# Patient Record
Sex: FEMALE | Race: BLACK OR AFRICAN AMERICAN | Employment: FULL TIME | URBAN - METROPOLITAN AREA
[De-identification: names, ages, dates, MRNs, and addresses within clinical notes are randomized per-mention and may not be internally consistent; named-entity substitution may affect disease eponyms.]

---

## 2017-02-01 RX ORDER — TEMAZEPAM 15 MG/1
CAPSULE ORAL
Qty: 60 CAP | Refills: 0 | OUTPATIENT
Start: 2017-02-01 | End: 2017-03-22 | Stop reason: SDUPTHER

## 2017-02-01 NOTE — TELEPHONE ENCOUNTER
Please call in Restoril per orders. [facility] web site reviewed, patient not in database.

## 2017-02-12 ENCOUNTER — APPOINTMENT (OUTPATIENT)
Dept: GENERAL RADIOLOGY | Age: 47
End: 2017-02-12
Attending: EMERGENCY MEDICINE
Payer: COMMERCIAL

## 2017-02-12 ENCOUNTER — HOSPITAL ENCOUNTER (EMERGENCY)
Age: 47
Discharge: HOME | End: 2017-02-12
Attending: EMERGENCY MEDICINE | Admitting: EMERGENCY MEDICINE
Payer: COMMERCIAL

## 2017-02-12 VITALS
TEMPERATURE: 97.9 F | SYSTOLIC BLOOD PRESSURE: 127 MMHG | WEIGHT: 186.8 LBS | HEART RATE: 70 BPM | DIASTOLIC BLOOD PRESSURE: 77 MMHG | BODY MASS INDEX: 30.02 KG/M2 | OXYGEN SATURATION: 98 % | RESPIRATION RATE: 16 BRPM | HEIGHT: 66 IN

## 2017-02-12 DIAGNOSIS — S39.012A BACK STRAIN, INITIAL ENCOUNTER: Primary | ICD-10-CM

## 2017-02-12 LAB
AMORPH CRY URNS QL MICRO: ABNORMAL
APPEARANCE UR: ABNORMAL
BACTERIA URNS QL MICRO: NEGATIVE /HPF
BILIRUB UR QL: NEGATIVE
COLOR UR: ABNORMAL
EPITH CASTS URNS QL MICRO: ABNORMAL /LPF
GLUCOSE UR STRIP.AUTO-MCNC: NEGATIVE MG/DL
HGB UR QL STRIP: ABNORMAL
KETONES UR QL STRIP.AUTO: NEGATIVE MG/DL
LEUKOCYTE ESTERASE UR QL STRIP.AUTO: ABNORMAL
MUCOUS THREADS URNS QL MICRO: ABNORMAL /LPF
NITRITE UR QL STRIP.AUTO: NEGATIVE
PH UR STRIP: 6 [PH] (ref 5–8)
PROT UR STRIP-MCNC: NEGATIVE MG/DL
RBC #/AREA URNS HPF: >100 /HPF (ref 0–5)
SP GR UR REFRACTOMETRY: 1.02 (ref 1–1.03)
UA: UC IF INDICATED,UAUC: ABNORMAL
UROBILINOGEN UR QL STRIP.AUTO: 1 EU/DL (ref 0.2–1)
WBC URNS QL MICRO: ABNORMAL /HPF (ref 0–4)

## 2017-02-12 PROCEDURE — 72100 X-RAY EXAM L-S SPINE 2/3 VWS: CPT

## 2017-02-12 PROCEDURE — 72072 X-RAY EXAM THORAC SPINE 3VWS: CPT

## 2017-02-12 PROCEDURE — 81001 URINALYSIS AUTO W/SCOPE: CPT | Performed by: EMERGENCY MEDICINE

## 2017-02-12 PROCEDURE — 99283 EMERGENCY DEPT VISIT LOW MDM: CPT

## 2017-02-12 PROCEDURE — 74011250636 HC RX REV CODE- 250/636: Performed by: EMERGENCY MEDICINE

## 2017-02-12 PROCEDURE — 96372 THER/PROPH/DIAG INJ SC/IM: CPT

## 2017-02-12 RX ORDER — KETOROLAC TROMETHAMINE 30 MG/ML
60 INJECTION, SOLUTION INTRAMUSCULAR; INTRAVENOUS
Status: COMPLETED | OUTPATIENT
Start: 2017-02-12 | End: 2017-02-12

## 2017-02-12 RX ORDER — CYCLOBENZAPRINE HCL 10 MG
5 TABLET ORAL
Qty: 30 TAB | Refills: 0 | Status: SHIPPED | OUTPATIENT
Start: 2017-02-12 | End: 2017-04-05

## 2017-02-12 RX ORDER — NAPROXEN 500 MG/1
500 TABLET ORAL 2 TIMES DAILY WITH MEALS
Qty: 30 TAB | Refills: 0 | Status: SHIPPED | OUTPATIENT
Start: 2017-02-12 | End: 2017-03-28

## 2017-02-12 RX ADMIN — KETOROLAC TROMETHAMINE 60 MG: 30 INJECTION, SOLUTION INTRAMUSCULAR at 03:28

## 2017-02-12 NOTE — ED PROVIDER NOTES
HPI Comments: 40 y.o. female with past medical history significant for asthma, GERD, hypercholesteremia, allergic rhinitis, and hysterectomy who presents from home with chief complaint of back pain. Pt c/o constant lower back pain that started two days ago. Pt states that she hurt her upper back (between her scapulas) 10 days ago trying to open a door. Pt states that she used heating pads on her back to relieve the pain. Pt denies any nausea, vomiting, abdominal pain, numbness, tingling, bowel movement changes, or dysuria. Pt also denies any back surgeries. There are no other acute medical concerns at this time. PCP: Virginia Barber MD    Note written by Cynthia Cotter, as dictated by Chayo Rea MD 2:57 AM        The history is provided by the patient. No  was used. Past Medical History:   Diagnosis Date    Allergic rhinitis 6/14/2013    Asthma     GERD (gastroesophageal reflux disease)     Headache     Headache(784.0)     Hypercholesteremia 1/23/2012    Snoring     Vitamin D deficiency 10/20/2015       Past Surgical History:   Procedure Laterality Date    Endoscopy, colon, diagnostic  12/03 & 04/09    Hx gyn       Laproscopy, BTL,TVH    Hx gyn       hysterectomy         Family History:   Problem Relation Age of Onset    Hypertension Father     Kidney Disease Maternal Grandmother     Hypertension Maternal Grandmother     Heart Attack Paternal Grandfather     Cancer Other        Social History     Social History    Marital status:      Spouse name: N/A    Number of children: N/A    Years of education: N/A     Occupational History    Not on file.      Social History Main Topics    Smoking status: Never Smoker    Smokeless tobacco: Never Used    Alcohol use Yes      Comment: Occasionally    Drug use: No    Sexual activity: Yes     Partners: Male     Birth control/ protection: None     Other Topics Concern    Not on file     Social History Narrative ALLERGIES: Latex and Aspirin    Review of Systems   Gastrointestinal: Negative for abdominal pain, constipation, diarrhea, nausea and vomiting. Genitourinary: Negative for dysuria. Musculoskeletal: Positive for back pain. Neurological: Negative for numbness. All other systems reviewed and are negative. Vitals:    02/12/17 0125   BP: 131/88   Pulse: 72   Resp: 18   Temp: 98 °F (36.7 °C)   SpO2: 97%   Weight: 84.7 kg (186 lb 12.8 oz)   Height: 5' 5.5\" (1.664 m)            Physical Exam   Nursing note and vitals reviewed. CONSTITUTIONAL: Well-appearing; well-nourished; in no apparent distress  HEAD: Normocephalic; atraumatic  EYES: PERRL; EOM intact; conjunctiva and sclera are clear bilaterally. ENT: No rhinorrhea; normal pharynx with no tonsillar hypertrophy; mucous membranes pink/moist, no erythema, no exudate. NECK: Supple; non-tender; no cervical lymphadenopathy  CARD: Normal S1, S2; no murmurs, rubs, or gallops. Regular rate and rhythm. RESP: Normal respiratory effort; breath sounds clear and equal bilaterally; no wheezes, rhonchi, or rales. ABD: Normal bowel sounds; non-distended; non-tender; no palpable organomegaly, no masses, no bruits. Back Exam: Normal inspection; L/S and Thoracic vertebral point tenderness, no CVA tenderness. Decreased range of motion due to pain. Leg raised: negative for pain; normal gait; reflexes intact  EXT: Normal ROM in all four extremities; non-tender to palpation; no swelling or deformity; distal pulses are normal, no edema. SKIN: Warm; dry; no rash. NEURO:Alert and oriented x 3, coherent, RILEY-XII grossly intact, sensory and motor are non-focal.        MDM  Number of Diagnoses or Management Options  Diagnosis management comments: Assessment: lumbar spine and thoracic spine discomfort that is palpable and reproducible on exam consistent with musculoskeletal etiology. Rule out acute spinal disease.  The patient has no neurological deficit, ambulate without difficulty. Suspicion for cord compression/cauda equina is very low. Plan: x-ray of the lumbar and thoracic spine/ analgesia/ serial exam/ Monitor and Reevaluate. Amount and/or Complexity of Data Reviewed  Clinical lab tests: ordered and reviewed  Tests in the radiology section of CPT®: ordered and reviewed  Tests in the medicine section of CPT®: reviewed and ordered  Discussion of test results with the performing providers: yes  Decide to obtain previous medical records or to obtain history from someone other than the patient: yes  Obtain history from someone other than the patient: yes  Review and summarize past medical records: yes  Discuss the patient with other providers: yes  Independent visualization of images, tracings, or specimens: yes    Risk of Complications, Morbidity, and/or Mortality  Presenting problems: moderate  Diagnostic procedures: moderate  Management options: moderate      ED Course       Procedures     XRAY INTERPRETATION (ED MD)  Xray of Lumbar spine shows no fracture. No subluxation/dislocation. No bony abnormality. Maryann Hall MD 3:08 AM      [facility] [place] INTERPRETATION (ED MD)  Xray of T-spine shows no fracture. No subluxation/dislocation. No bony abnormality. Maryann Hall MD 3:08 AM      Progress Note:   Pt has been reexamined by Maryann Hall MD. Pt is feeling much better. Symptoms have improved. All available results have been reviewed with pt and any available family. Pt understands sx, dx, and tx in ED. Care plan has been outlined and questions have been answered. Pt is ready to go home. Will send home on  back strain instructions. Prescription of Naproxen and Flexeril. Outpatient referral with PCP as needed. Written by Maryann Hall MD,3:30 AM    .   .

## 2017-02-12 NOTE — ED TRIAGE NOTES
Triage Note: \"My back hurts, from up here in between my blades all the way down and some on my lower sides, but it's mostly in my back. 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

## 2017-02-12 NOTE — LETTER
[address]  [address] [facility]  [id number]  [phone]    Work/School Note    Date: 2/12/2017    To Whom It May concern:    Micaela Paula was seen and treated today in the emergency room by the following provider(s):  Attending Provider: Amara Urbina MD.      Micaela Paula may return to work on 2/14/17 or sooner if symptoms improve.     Sincerely,          Priya Metcalf RN

## 2017-02-12 NOTE — DISCHARGE INSTRUCTIONS
We hope that we have addressed all of your medical concerns. The examination and treatment you received in the Emergency Department were for an emergent problem and were not intended as complete care. It is important that you follow up with your healthcare provider(s) for ongoing care. If your symptoms worsen or do not improve as expected, and you are unable to reach your usual health care provider(s), you should return to the Emergency Department. Today's healthcare is undergoing tremendous change, and patient satisfaction surveys are one of the many tools to assess the quality of medical care. You may receive a survey from the [employer] regarding your experience in the Emergency Department. I hope that your experience has been completely positive, particularly the medical care that I provided. As such, please participate in the survey; anything less than excellent does not meet my expectations or intentions. [facility] and [address] [place] [address] participate in nationally recognized quality of care measures. If your blood pressure is greater than 120/80, as reported below, we urge that you seek medical care to address the potential of high blood pressure, commonly known as hypertension. Hypertension can be hereditary or can be caused by certain medical conditions, pain, stress, or \"white coat syndrome. \"       Please make an appointment with your health care provider(s) for follow up of your Emergency Department visit. VITALS:   Patient Vitals for the past 8 hrs:   Temp Pulse Resp BP SpO2   02/12/17 0125 98 °F (36.7 °C) 72 18 131/88 97 %          Thank you for allowing us to provide you with medical care today. We realize that you have many choices for your emergency care needs. Please choose us in the future for any continued health care needs. Henrique Lyn MD    [facility].   Office: [phone]            Recent Results (from the past 24 hour(s))   URINALYSIS W/ REFLEX CULTURE    Collection Time: 02/12/17  1:57 AM   Result Value Ref Range    Color YELLOW/STRAW      Appearance CLOUDY (A) CLEAR      Specific gravity 1.021 1.003 - 1.030      pH (UA) 6.0 5.0 - 8.0      Protein NEGATIVE  NEG mg/dL    Glucose NEGATIVE  NEG mg/dL    Ketone NEGATIVE  NEG mg/dL    Bilirubin NEGATIVE  NEG      Blood LARGE (A) NEG      Urobilinogen 1.0 0.2 - 1.0 EU/dL    Nitrites NEGATIVE  NEG      Leukocyte Esterase SMALL (A) NEG      WBC 0-4 0 - 4 /hpf    RBC >100 (H) 0 - 5 /hpf    Epithelial cells MODERATE (A) FEW /lpf    Bacteria NEGATIVE  NEG /hpf    UA:UC IF INDICATED CULTURE NOT INDICATED BY UA RESULT CNI      Mucus TRACE (A) NEG /lpf    Amorphous Crystals FEW (A) NEG         No results found. Back Strain: Care Instructions  Your Care Instructions    Back strain happens when you overstretch, or pull, a muscle in your back. You may hurt your back in an accident or when you exercise or lift something. Most back pain will get better with rest and time. You can take care of yourself at home to help your back heal.  Follow-up care is a key part of your treatment and safety. Be sure to make and go to all appointments, and call your doctor if you are having problems. It's also a good idea to know your test results and keep a list of the medicines you take. How can you care for yourself at home? · Try to stay as active as you can, but stop or reduce any activity that causes pain. · Put ice or a cold pack on the sore muscle for 10 to 20 minutes at a time to stop swelling. Try this every 1 to 2 hours for 3 days (when you are awake) or until the swelling goes down. Put a thin cloth between the ice pack and your skin. · After 2 or 3 days, apply a heating pad on low or a warm cloth to your back. Some doctors suggest that you go back and forth between hot and cold treatments.   · Take pain medicines exactly as directed. ¨ If the doctor gave you a prescription medicine for pain, take it as prescribed. ¨ If you are not taking a prescription pain medicine, ask your doctor if you can take an over-the-counter medicine. · Try sleeping on your side with a pillow between your legs. Or put a pillow under your knees when you lie on your back. These measures can ease pain in your lower back. · Return to your usual level of activity slowly. When should you call for help? Call 911 anytime you think you may need emergency care. For example, call if:  · You are unable to move a leg at all. Call your doctor now or seek immediate medical care if:  · You have new or worse symptoms in your legs, belly, or buttocks. Symptoms may include:  ¨ Numbness or tingling. ¨ Weakness. ¨ Pain. · You lose bladder or bowel control. Watch closely for changes in your health, and be sure to contact your doctor if you are not getting better as expected. Where can you learn more? Go to http://mario-barry.info/. Enter C542 in the search box to learn more about \"Back Strain: Care Instructions. \"  Current as of: May 23, 2016  Content Version: 11.1  © 8966-2529 [employer]. Care instructions adapted under license by [employer] (which disclaims liability or warranty for this information). If you have questions about a medical condition or this instruction, always ask your healthcare professional. [place] [address] any warranty or liability for your use of this information.

## 2017-02-12 NOTE — ED NOTES
Patient given discharge instructions and verbalized understanding. Patient ambulatory out of department with steady gait.

## 2017-03-26 RX ORDER — TEMAZEPAM 15 MG/1
CAPSULE ORAL
Qty: 60 CAP | Refills: 0 | OUTPATIENT
Start: 2017-03-26 | End: 2017-03-28 | Stop reason: SDUPTHER

## 2017-03-28 ENCOUNTER — OFFICE VISIT (OUTPATIENT)
Dept: FAMILY MEDICINE CLINIC | Age: 47
End: 2017-03-28

## 2017-03-28 VITALS
RESPIRATION RATE: 20 BRPM | BODY MASS INDEX: 30.73 KG/M2 | DIASTOLIC BLOOD PRESSURE: 68 MMHG | WEIGHT: 191.2 LBS | SYSTOLIC BLOOD PRESSURE: 109 MMHG | TEMPERATURE: 98.1 F | HEIGHT: 66 IN | HEART RATE: 79 BPM

## 2017-03-28 DIAGNOSIS — F51.04 CHRONIC INSOMNIA: Primary | ICD-10-CM

## 2017-03-28 DIAGNOSIS — E66.9 NON MORBID OBESITY, UNSPECIFIED OBESITY TYPE: ICD-10-CM

## 2017-03-28 RX ORDER — TEMAZEPAM 15 MG/1
CAPSULE ORAL
Qty: 60 CAP | Refills: 0 | Status: SHIPPED | OUTPATIENT
Start: 2017-03-28 | End: 2017-08-27 | Stop reason: SDUPTHER

## 2017-03-28 NOTE — PROGRESS NOTES
HISTORY OF PRESENT ILLNESS  Lester Peters is a 40 y.o. female. Insomnia   This is a chronic problem. The current episode started more than 1 week ago. Episode frequency: almost daily. The problem has not changed since onset. Pertinent negatives include no chest pain and no shortness of breath. Exacerbated by: changing shifts. The symptoms are relieved by medications. Treatments tried: Restoril the days she works. 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 The treatment provided significant relief. Review of Systems   Constitutional: Positive for malaise/fatigue. Negative for weight loss. Weight gain   Respiratory: Negative for shortness of breath. Cardiovascular: Negative for chest pain and palpitations. Psychiatric/Behavioral: Negative for depression and substance abuse. The patient has insomnia. The patient is not nervous/anxious. Visit Vitals    /68 (BP 1 Location: Left arm, BP Patient Position: Sitting)    Pulse 79    Temp 98.1 °F (36.7 °C) (Oral)    Resp 20    Ht 5' 5.5\" (1.664 m)    Wt 191 lb 3.2 oz (86.7 kg)    BMI 31.33 kg/m2     Physical Exam   Constitutional: She is oriented to person, place, and time. She appears well-developed and well-nourished. No distress. Neurological: She is alert and oriented to person, place, and time. She displays no tremor. Skin: She is not diaphoretic. Psychiatric: She has a normal mood and affect. Her behavior is normal. Judgment and thought content normal.       ASSESSMENT and PLAN    ICD-10-CM ICD-9-CM    1. Chronic insomnia F51.04 780.52 temazepam (RESTORIL) 15 mg capsule   2. Non morbid obesity, unspecified obesity type E66.9 278.00         Insomnia related to shift work  Weight gain  Restoril refilled,  web site reviewed, patient not in [email or web address] discussed, information given  I have reviewed/discussed the above normal BMI with the patient.   I have recommended the following interventions: encourage exercise and lifestyle education regarding diet . The plan is as follows: I have counseled this patient on diet and exercise regimens. .      Follow-up Disposition:  Return in about 6 months (around 9/28/2017) for insomnia. Reviewed plan of care. Patient has provided input and agrees with goals.

## 2017-03-28 NOTE — MR AVS SNAPSHOT
Visit Information     Date & Time Provider Department Dept. Phone Encounter #    3/28/2017 10:30 AM Rand Duckworth MD [address] [address] [id number]      Follow-up Instructions     Return in about 6 months (around 9/28/2017) for insomnia. Upcoming Health Maintenance        Date Due    PAP AKA CERVICAL CYTOLOGY 10/9/1992    DTaP/Tdap/Td series (2 - Td) 3/28/2027      Allergies as of 3/28/2017  Review Complete On: 3/28/2017 By: Rand Duckworth MD       Severity Noted Reaction Type Reactions    Latex Medium 01/04/2013   Topical Rash    Aspirin  02/12/2017    Other (comments)    \"It doesn't digest in my system\"      Current Immunizations  Reviewed on 3/28/2017    No immunizations on file.        Reviewed by Wero Glasgow LPN on 0/98/5611 at 47:15 AM   You Were Diagnosed With        Codes Comments    Chronic insomnia    -  Primary ICD-10-CM: F51.04  ICD-9-CM: 780.52     Non morbid obesity, unspecified obesity type     ICD-10-CM: E66.9  ICD-9-CM: 278.00       Vitals     BP Pulse Temp Resp Height(growth percentile) Weight(growth percentile)    109/68 (BP 1 Location: Left arm, BP Patient Position: Sitting) 79 98.1 °F (36.7 °C) (Oral) 20 5' 5.5\" (1.664 m) 191 lb 3.2 oz (86.7 kg)    BMI OB Status Smoking Status             31.33 kg/m2 Hysterectomy Never Smoker         BMI and BSA Data     Body Mass Index Body Surface Area    31.33 kg/m 2 2 m 2         Preferred Pharmacy       Pharmacy Name Phone    [facility] [address] [facility] [address] [place] [phone]         Your Updated Medication List          This list is accurate as of: 3/28/17 11:40 AM.  Always use your most recent med list.                butalbital-acetaminophen-caff -40 mg per capsule   Commonly known as:  FIORICET   TAKE 1 TO 2 CAPSULE BY MOUTH EVERY 8 HOURS AS NEEDED FOR HEADACHES       CAMBIA 50 mg Pwpk   Generic drug:  Diclofenac Potassium   1 at HA onset       cyclobenzaprine 10 mg tablet   Commonly known as:  FLEXERIL   Take 0.5 Tabs by mouth three (3) times daily as needed for Muscle Spasm(s). pravastatin 20 mg tablet   Commonly known as:  PRAVACHOL   TAKE 1 TABLET BY MOUTH EVERY NIGHT       temazepam 15 mg capsule   Commonly known as:  RESTORIL   TAKE 1 TO 2 CAPSULE BY MOUTH EVERY NIGHT AT BEDTIME AS NEEDED FOR SLEEP       ZyrTEC 10 mg Cap   Generic drug:  Cetirizine   Take 10 mg by mouth daily. Prescriptions Printed        Refills    temazepam (RESTORIL) 15 mg capsule 0    Sig: TAKE 1 TO 2 CAPSULE BY MOUTH EVERY NIGHT AT BEDTIME AS NEEDED FOR SLEEP    Class: Print      Follow-up Instructions     Return in about 6 months (around 9/28/2017) for insomnia. Patient Instructions    Learning About Sleeping Well  What does sleeping well mean? Sleeping well means getting enough sleep. How much sleep is enough varies among people. The number of hours you sleep is not as important as how you feel when you wake up. If you do not feel refreshed, you probably need more sleep. Another sign of not getting enough sleep is feeling tired during the day. The average total nightly sleep time is 7½ to 8 hours. Healthy adults may need a little more or a little less than this. Why is getting enough sleep important? Getting enough quality sleep is a basic part of good health. When your sleep suffers, your mood and your thoughts can suffer too. You may find yourself feeling more grumpy or stressed. Not getting enough sleep also can lead to serious problems, including injury, accidents, anxiety, and depression. What might cause poor sleeping? Many things can cause sleep problems, including:  Stress. Stress can be caused by fear about a single event, such as giving a speech. Or you may have ongoing stress, such as worry about work or school. Depression, anxiety, and other mental or emotional conditions.    Changes in your sleep habits or surroundings. This includes changes that happen where you sleep, such as noise, light, or sleeping in a different bed. It also includes changes in your sleep pattern, such as having jet lag or working a late shift. Health problems, such as pain, breathing problems, and restless legs syndrome. Lack of regular exercise. How can you help yourself? Here are some tips that may help you sleep more soundly and wake up feeling more refreshed. Your sleeping area  Use your bedroom only for sleeping and sex. A bit of light reading may help you fall asleep. But if it doesn't, do your reading elsewhere in the house. Don't watch TV in bed. Be sure your bed is big enough to stretch out comfortably, especially if you have a sleep partner. Keep your bedroom quiet, dark, and cool. Use curtains, blinds, or a sleep mask to block out light. To block out noise, use earplugs, soothing music, or a \"white noise\" machine. Your evening and bedtime routine  Get regular exercise, but not within 3 to 4 hours before your bedtime. Create a relaxing bedtime routine. You might want to take a warm shower or bath, listen to soothing music, or drink a cup of noncaffeinated tea. Go to bed at the same time every night. And get up at the same time every morning, even if you feel tired. What to avoid  Limit caffeine (coffee, tea, caffeinated sodas) during the day, and don't have any for at least 4 to 6 hours before bedtime. Don't drink alcohol before bedtime. Alcohol can cause you to wake up more often during the night. Don't smoke or use tobacco, especially in the evening. Nicotine can keep you awake. Don't take naps during the day, especially close to bedtime. Don't lie in bed awake for too long. If you can't fall asleep, or if you wake up in the middle of the night and can't get back to sleep within 15 minutes or so, get out of bed and go to another room until you feel sleepy.    Don't take medicine that may keep you awake, or make you feel hyper or energized, right before bed. Your doctor can tell you if your medicine may do this and if you can take it earlier in the day. If you can't sleep  Imagine yourself in a peaceful, pleasant scene. Focus on the details and feelings of being in a place that is relaxing. Get up and do a quiet or boring activity until you feel sleepy. Don't drink any liquids after 6 p.m. if you wake up often because you have to go to the bathroom. Where can you learn more? Go to [employer].be  Enter W442 in the search box to learn more about \"Learning About Sleeping Well. \"   © 6152-4832 Healthwise, Incorporated. Care instructions adapted under license by [employer] (which disclaims liability or warranty for this information). This care instruction is for use with your licensed healthcare professional. If you have questions about a medical condition or this instruction, always ask your healthcare professional. [place] [address] any warranty or liability for your use of this information. Content Version: 5.5.74802; Last Revised: March 18, 2011          Insomnia: After Your Visit  Your Care Instructions  Insomnia is the inability to sleep well. It is a common problem for most people at some time. Insomnia may make it hard for you to get to sleep, stay asleep, or sleep as long as you need to. This can make you tired and grouchy during the day. It can also make you forgetful, less effective at work, and unhappy. Insomnia can be caused by conditions such as depression or anxiety. Pain can also affect your ability to sleep. When these problems are solved, the insomnia usually clears up. But sometimes bad sleep habits can cause insomnia. If insomnia is affecting your work or your enjoyment of life, you can take steps to improve your sleep. Follow-up care is a key part of your treatment and safety.  Be sure to make and go to all appointments, and call your doctor if you are having problems. Its also a good idea to know your test results and keep a list of the medicines you take. How can you care for yourself at home? What to avoid  Do not have drinks with caffeine, such as coffee or black tea, for 8 hours before bed. Do not smoke or use other types of tobacco near bedtime. Nicotine is a stimulant and can keep you awake. Avoid drinking alcohol late in the evening, because it can cause you to wake in the middle of the night. Do not eat a big meal close to bedtime. If you are hungry, eat a light snack. Do not drink a lot of water close to bedtime, because the need to urinate may wake you up during the night. Do not read or watch TV in bed. Use the bed only for sleeping and sexual activity. What to try  Go to bed at the same time every night, and wake up at the same time every morning. Do not take naps during the day. Keep your bedroom quiet, dark, and cool. Get regular exercise, but not within 3 to 4 hours of your bedtime. .   Sleep on a comfortable pillow and mattress. If watching the clock makes you anxious, turn it facing away from you so you cannot see the time. If you worry when you lie down, start a worry book. Well before bedtime, write down your worries, and then set the book and your concerns aside. Try meditation or other relaxation techniques before you go to bed. If you cannot fall asleep after 15 minutes, get up and go to another room until you feel sleepy. Do something relaxing. Repeat your bedtime routine before you go to bed again. Repeat this as often as necessary. Make your house quiet and calm about an hour before bedtime. Turn down the lights, turn off the TV, log off the computer, and turn down the volume on music. This can help you relax after a busy day. When should you call for help? Watch closely for changes in your health, and be sure to contact your doctor if:  Your efforts to improve your sleep do not work.    Your insomnia gets worse.   You fall asleep during the day. Where can you learn more? Go to [employer].be  Enter P513 in the search box to learn more about \"Insomnia: After Your Visit. \"   © 9842-0311 Healthwise, Incorporated. Care instructions adapted under license by [employer] (which disclaims liability or warranty for this information). This care instruction is for use with your licensed healthcare professional. If you have questions about a medical condition or this instruction, always ask your healthcare professional. [address] any warranty or liability for your use of this information. Content Version: 9.5.60743; Last Revised: June 26, 2010                 Introducing [facility]! Dear Leopoldo Armour: Thank you for requesting a [employer] account. Our records indicate that you already have an active [employer] account. You can access your account anytime at [email or web address]     Did you know that you can access your hospital and ER discharge instructions at any time in [employer]? You can also review all of your test results from your hospital stay or ER visit. Additional Information    If you have questions, please visit the Frequently Asked Questions section of the [employer] website at [email or web address] Remember, [employer] is NOT to be used for urgent needs. For medical emergencies, dial 911. Now available from your iPhone and Android! Please provide this summary of care documentation to your next provider. Your primary care clinician is listed as Traci Campoverde. If you have any questions after today's visit, please call [phone].

## 2017-03-28 NOTE — PATIENT INSTRUCTIONS
Learning About Sleeping Well  What does sleeping well mean? Sleeping well means getting enough sleep. How much sleep is enough varies among people. The number of hours you sleep is not as important as how you feel when you wake up. If you do not feel refreshed, you probably need more sleep. Another sign of not getting enough sleep is feeling tired during the day. The average total nightly sleep time is 7½ to 8 hours. Healthy adults may need a little more or a little less than this. Why is getting enough sleep important? Getting enough quality sleep is a basic part of good health. When your sleep suffers, your mood and your thoughts can suffer too. You may find yourself feeling more grumpy or stressed. Not getting enough sleep also can lead to serious problems, including injury, accidents, anxiety, and depression. What might cause poor sleeping? Many things can cause sleep problems, including:  Stress. Stress can be caused by fear about a single event, such as giving a speech. Or you may have ongoing stress, such as worry about work or school. Depression, anxiety, and other mental or emotional conditions. Changes in your sleep habits or surroundings. This includes changes that happen where you sleep, such as noise, light, or sleeping in a different bed. It also includes changes in your sleep pattern, such as having jet lag or working a late shift. Health problems, such as pain, breathing problems, and restless legs syndrome. Lack of regular exercise. How can you help yourself? Here are some tips that may help you sleep more soundly and wake up feeling more refreshed. Your sleeping area  Use your bedroom only for sleeping and sex. A bit of light reading may help you fall asleep. But if it doesn't, do your reading elsewhere in the house. Don't watch TV in bed. Be sure your bed is big enough to stretch out comfortably, especially if you have a sleep partner. Keep your bedroom quiet, dark, and cool. Use curtains, blinds, or a sleep mask to block out light. To block out noise, use earplugs, soothing music, or a \"white noise\" machine. Your evening and bedtime routine  Get regular exercise, but not within 3 to 4 hours before your bedtime. Create a relaxing bedtime routine. You might want to take a warm shower or bath, listen to soothing music, or drink a cup of noncaffeinated tea. Go to bed at the same time every night. And get up at the same time every morning, even if you feel tired. What to avoid  Limit caffeine (coffee, tea, caffeinated sodas) during the day, and don't have any for at least 4 to 6 hours before bedtime. Don't drink alcohol before bedtime. Alcohol can cause you to wake up more often during the night. Don't smoke or use tobacco, especially in the evening. Nicotine can keep you awake. Don't take naps during the day, especially close to bedtime. Don't lie in bed awake for too long. If you can't fall asleep, or if you wake up in the middle of the night and can't get back to sleep within 15 minutes or so, get out of bed and go to another room until you feel sleepy. Don't take medicine that may keep you awake, or make you feel hyper or energized, right before bed. Your doctor can tell you if your medicine may do this and if you can take it earlier in the day. If you can't sleep  Imagine yourself in a peaceful, pleasant scene. Focus on the details and feelings of being in a place that is relaxing. Get up and do a quiet or boring activity until you feel sleepy. Don't drink any liquids after 6 p.m. if you wake up often because you have to go to the bathroom. Where can you learn more? Go to [employer].be  Enter F302 in the search box to learn more about \"Learning About Sleeping Well. \"   © 7093-4971 Healthwise, Incorporated. Care instructions adapted under license by [employer] (which disclaims liability or warranty for this information).  This care instruction is for use with your licensed healthcare professional. If you have questions about a medical condition or this instruction, always ask your healthcare professional. [place] [address] any warranty or liability for your use of this information. Content Version: 0.9.69380; Last Revised: March 18, 2011          Insomnia: After Your Visit  Your Care Instructions  Insomnia is the inability to sleep well. It is a common problem for most people at some time. Insomnia may make it hard for you to get to sleep, stay asleep, or sleep as long as you need to. This can make you tired and grouchy during the day. It can also make you forgetful, less effective at work, and unhappy. Insomnia can be caused by conditions such as depression or anxiety. Pain can also affect your ability to sleep. When these problems are solved, the insomnia usually clears up. But sometimes bad sleep habits can cause insomnia. If insomnia is affecting your work or your enjoyment of life, you can take steps to improve your sleep. Follow-up care is a key part of your treatment and safety. Be sure to make and go to all appointments, and call your doctor if you are having problems. Its also a good idea to know your test results and keep a list of the medicines you take. How can you care for yourself at home? What to avoid  Do not have drinks with caffeine, such as coffee or black tea, for 8 hours before bed. Do not smoke or use other types of tobacco near bedtime. Nicotine is a stimulant and can keep you awake. Avoid drinking alcohol late in the evening, because it can cause you to wake in the middle of the night. Do not eat a big meal close to bedtime. If you are hungry, eat a light snack. Do not drink a lot of water close to bedtime, because the need to urinate may wake you up during the night. Do not read or watch TV in bed. Use the bed only for sleeping and sexual activity.   What to try  Go to bed at the same time every night, and wake up at the same time every morning. Do not take naps during the day. Keep your bedroom quiet, dark, and cool. Get regular exercise, but not within 3 to 4 hours of your bedtime. .   Sleep on a comfortable pillow and mattress. If watching the clock makes you anxious, turn it facing away from you so you cannot see the time. If you worry when you lie down, start a worry book. Well before bedtime, write down your worries, and then set the book and your concerns aside. Try meditation or other relaxation techniques before you go to bed. If you cannot fall asleep after 15 minutes, get up and go to another room until you feel sleepy. Do something relaxing. Repeat your bedtime routine before you go to bed again. Repeat this as often as necessary. Make your house quiet and calm about an hour before bedtime. Turn down the lights, turn off the TV, log off the computer, and turn down the volume on music. This can help you relax after a busy day. When should you call for help? Watch closely for changes in your health, and be sure to contact your doctor if:  Your efforts to improve your sleep do not work. Your insomnia gets worse. You fall asleep during the day. Where can you learn more? Go to [employer].be  Enter P513 in the search box to learn more about \"Insomnia: After Your Visit. \"   © 4788-9467 Healthwise, Incorporated. Care instructions adapted under license by [facility] [employer] (which disclaims liability or warranty for this information). This care instruction is for use with your licensed healthcare professional. If you have questions about a medical condition or this instruction, always ask your healthcare professional. [place] [address] any warranty or liability for your use of this information.   Content Version: 2.2.75887; Last Revised: June 26, 2010

## 2017-04-05 ENCOUNTER — HOSPITAL ENCOUNTER (EMERGENCY)
Age: 47
Discharge: HOME | End: 2017-04-05
Attending: EMERGENCY MEDICINE
Payer: COMMERCIAL

## 2017-04-05 ENCOUNTER — APPOINTMENT (OUTPATIENT)
Dept: GENERAL RADIOLOGY | Age: 47
End: 2017-04-05
Attending: EMERGENCY MEDICINE
Payer: COMMERCIAL

## 2017-04-05 ENCOUNTER — APPOINTMENT (OUTPATIENT)
Dept: CT IMAGING | Age: 47
End: 2017-04-05
Attending: EMERGENCY MEDICINE
Payer: COMMERCIAL

## 2017-04-05 VITALS
WEIGHT: 189.06 LBS | TEMPERATURE: 98.1 F | SYSTOLIC BLOOD PRESSURE: 133 MMHG | BODY MASS INDEX: 30.39 KG/M2 | HEART RATE: 69 BPM | OXYGEN SATURATION: 98 % | RESPIRATION RATE: 16 BRPM | HEIGHT: 66 IN | DIASTOLIC BLOOD PRESSURE: 83 MMHG

## 2017-04-05 DIAGNOSIS — R51.9 ACUTE NONINTRACTABLE HEADACHE, UNSPECIFIED HEADACHE TYPE: Primary | ICD-10-CM

## 2017-04-05 DIAGNOSIS — S39.012A LUMBAR STRAIN, INITIAL ENCOUNTER: ICD-10-CM

## 2017-04-05 DIAGNOSIS — S16.1XXA CERVICAL STRAIN, INITIAL ENCOUNTER: ICD-10-CM

## 2017-04-05 PROCEDURE — 72110 X-RAY EXAM L-2 SPINE 4/>VWS: CPT

## 2017-04-05 PROCEDURE — 70450 CT HEAD/BRAIN W/O DYE: CPT

## 2017-04-05 PROCEDURE — 72050 X-RAY EXAM NECK SPINE 4/5VWS: CPT

## 2017-04-05 PROCEDURE — 99282 EMERGENCY DEPT VISIT SF MDM: CPT

## 2017-04-05 RX ORDER — METHOCARBAMOL 500 MG/1
500 TABLET, FILM COATED ORAL EVERY 6 HOURS
Qty: 20 TAB | Refills: 0 | Status: SHIPPED | OUTPATIENT
Start: 2017-04-05 | End: 2017-04-10
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
## 2017-04-05 NOTE — LETTER
[address]  [address] [facility] [address]  [phone]    Work/School Note    Date: 4/5/2017    To Whom It May concern:    Seda Miranda was seen and treated today in the emergency room by the following provider(s):  Attending Provider: Taylor Heller MD.      Seda Miranda - no work three days    Sincerely,          Taylor Heller MD
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
## 2017-04-06 ENCOUNTER — PATIENT OUTREACH (OUTPATIENT)
Dept: FAMILY MEDICINE CLINIC | Age: 47
End: 2017-04-06
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
## 2017-04-12 ENCOUNTER — OFFICE VISIT (OUTPATIENT)
Dept: FAMILY MEDICINE CLINIC | Age: 47
End: 2017-04-12

## 2017-04-12 VITALS
RESPIRATION RATE: 20 BRPM | WEIGHT: 189 LBS | TEMPERATURE: 97.8 F | HEART RATE: 79 BPM | BODY MASS INDEX: 30.37 KG/M2 | SYSTOLIC BLOOD PRESSURE: 125 MMHG | DIASTOLIC BLOOD PRESSURE: 89 MMHG | HEIGHT: 66 IN

## 2017-04-12 DIAGNOSIS — M54.2 NECK PAIN: ICD-10-CM

## 2017-04-12 DIAGNOSIS — G44.209 MUSCLE CONTRACTION HEADACHE: ICD-10-CM

## 2017-04-12 DIAGNOSIS — M54.9 ACUTE BACK PAIN, UNSPECIFIED BACK LOCATION, UNSPECIFIED BACK PAIN LATERALITY: Primary | ICD-10-CM

## 2017-04-12 RX ORDER — METHOCARBAMOL 500 MG/1
TABLET, FILM COATED ORAL
COMMUNITY
Start: 2017-04-05 | End: 2017-04-18 | Stop reason: SDUPTHER

## 2017-04-12 NOTE — LETTER
NOTIFICATION RETURN TO WORK / SCHOOL    4/12/2017 5:26 PM    Ms. Salvador Horn  [address]  [address] [address] [facility] [id number]      To Whom It May Concern:    Salvador Horn is currently under the care of [address] [place] [address]. She will return to work/school on: 4/27/17 and is excused starting 4/5/17. If there are questions or concerns please have the patient contact our office.         Sincerely,      Luis Fernando Gonzalez MD

## 2017-04-18 NOTE — TELEPHONE ENCOUNTER
Pt called requesting refill on her muscle relaxer be sent to [facility], stating she forgot to get this at her appointment with Dr. Jolly Gaviria.

## 2017-04-19 ENCOUNTER — TELEPHONE (OUTPATIENT)
Dept: FAMILY MEDICINE CLINIC | Age: 47
End: 2017-04-19

## 2017-04-19 RX ORDER — METHOCARBAMOL 500 MG/1
500 TABLET, FILM COATED ORAL
Qty: 120 TAB | Refills: 11 | Status: SHIPPED | OUTPATIENT
Start: 2017-04-19 | End: 2018-05-01

## 2017-04-20 ENCOUNTER — PATIENT OUTREACH (OUTPATIENT)
Dept: FAMILY MEDICINE CLINIC | Age: 47
End: 2017-04-20

## 2017-04-20 NOTE — PROGRESS NOTES
NN Follow-Up          Follow up call placed to patient. Patient discharged from [facility] on 17 for nonintractable headache/back pain. Panel Manager contacted the patient by telephone to perform post ED discharge follow up. Verified  and address with patient as identifiers. Provided introduction to self, and reason for calling. Patient reports that she continue to have back pain and headaches. Patient stated that she is taking fioricet and robaxin as prescribed which has some relief. Patient stated that she will start her Physical therapy on  at [facility]. Patient encourage to call office with any questions or concerns.

## 2017-04-20 NOTE — TELEPHONE ENCOUNTER
Pt called the office and request paperwork be faxed to her work at [phone] and a copy mailed to her home, this has been done.

## 2017-04-24 ENCOUNTER — HOSPITAL ENCOUNTER (OUTPATIENT)
Dept: PHYSICAL THERAPY | Age: 47
Discharge: HOME | End: 2017-04-24
Payer: COMMERCIAL

## 2017-04-24 PROCEDURE — 97162 PT EVAL MOD COMPLEX 30 MIN: CPT | Performed by: PHYSICAL THERAPIST

## 2017-04-24 PROCEDURE — 97014 ELECTRIC STIMULATION THERAPY: CPT | Performed by: PHYSICAL THERAPIST

## 2017-04-24 PROCEDURE — 97110 THERAPEUTIC EXERCISES: CPT | Performed by: PHYSICAL THERAPIST
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
## 2017-04-27 ENCOUNTER — HOSPITAL ENCOUNTER (OUTPATIENT)
Dept: PHYSICAL THERAPY | Age: 47
Discharge: HOME | End: 2017-04-27
Payer: COMMERCIAL

## 2017-04-27 ENCOUNTER — OFFICE VISIT (OUTPATIENT)
Dept: FAMILY MEDICINE CLINIC | Age: 47
End: 2017-04-27

## 2017-04-27 VITALS
HEART RATE: 82 BPM | RESPIRATION RATE: 20 BRPM | BODY MASS INDEX: 29.99 KG/M2 | HEIGHT: 66 IN | WEIGHT: 186.6 LBS | DIASTOLIC BLOOD PRESSURE: 74 MMHG | TEMPERATURE: 98.2 F | SYSTOLIC BLOOD PRESSURE: 109 MMHG

## 2017-04-27 DIAGNOSIS — S39.012D BACK STRAIN, SUBSEQUENT ENCOUNTER: Primary | ICD-10-CM

## 2017-04-27 DIAGNOSIS — S16.1XXD CERVICAL STRAIN, SUBSEQUENT ENCOUNTER: ICD-10-CM

## 2017-04-27 DIAGNOSIS — G44.209 MUSCLE CONTRACTION HEADACHE: ICD-10-CM

## 2017-04-27 PROCEDURE — 97110 THERAPEUTIC EXERCISES: CPT | Performed by: PHYSICAL MEDICINE & REHABILITATION

## 2017-04-27 PROCEDURE — 97014 ELECTRIC STIMULATION THERAPY: CPT | Performed by: PHYSICAL MEDICINE & REHABILITATION

## 2017-04-27 NOTE — MR AVS SNAPSHOT
Visit Information     Date & Time Provider Department Dept. Phone Encounter #    4/27/2017 11:00 AM Mike Spring MD [address] [phone] [id number]      Follow-up Instructions     Return in about 1 month (around 5/27/2017) for neck and back strain, headache. Your Appointments     5/30/2017 10:00 AM   COMPLETE PHYSICAL with Mike Spring MD   [facility] [address])   Appt Note: CPE only    [address] [phone]           [address] Maintenance        Date Due    PAP AKA CERVICAL CYTOLOGY 10/9/1992    DTaP/Tdap/Td series (2 - Td) 3/28/2027      Allergies as of 4/27/2017  Review Complete On: 4/27/2017 By: Mike Spring MD       Severity Noted Reaction Type Reactions    Latex Medium 01/04/2013   Topical Rash    Aspirin  02/12/2017    Other (comments)    \"It doesn't digest in my system\"      Current Immunizations  Reviewed on 3/28/2017    No immunizations on file. Not reviewed this visit   You Were Diagnosed With        Codes Comments    Back strain, subsequent encounter    -  Primary ICD-10-CM: S39.012D  ICD-9-CM: V58.89, 847.9     Cervical strain, subsequent encounter     ICD-10-CM: S16. 1XXD  ICD-9-CM: V58.89, 847.0     Muscle contraction headache     ICD-10-CM: G44.209  ICD-9-CM: 307.81       Vitals     BP Pulse Temp Resp Height(growth percentile) Weight(growth percentile)    109/74 (BP 1 Location: Left arm, BP Patient Position: Sitting) 82 98.2 °F (36.8 °C) (Oral) 20 5' 5.5\" (1.664 m) 186 lb 9.6 oz (84.6 kg)    BMI OB Status Smoking Status             30.58 kg/m2 Hysterectomy Never Smoker       Vitals History      BMI and BSA Data     Body Mass Index Body Surface Area    30.58 kg/m 2 1.98 m 2         Preferred Pharmacy       Pharmacy Name Phone    [facility] [address] [facility] [address] [place] [address] [phone]         Your Updated Medication List          This list is accurate as of: 4/27/17 11:42 AM.  Always use your most recent med list.                butalbital-acetaminophen-caff -40 mg per capsule   Commonly known as:  FIORICET   TAKE 1 TO 2 CAPSULE BY MOUTH EVERY 8 HOURS AS NEEDED FOR HEADACHES       methocarbamol 500 mg tablet   Commonly known as:  ROBAXIN   Take 1 Tab by mouth every six (6) hours as needed. temazepam 15 mg capsule   Commonly known as:  RESTORIL   TAKE 1 TO 2 CAPSULE BY MOUTH EVERY NIGHT AT BEDTIME AS NEEDED FOR SLEEP               Follow-up Instructions     Return in about 1 month (around 5/27/2017) for neck and back strain, headache. To-Do List     04/27/2017  4:00 PM     Appointment with Ismael Galvan at [facility] PT [address] [facility] [address] ([phone])       05/01/2017 5:00 PM     Appointment with Ismael Galvan at [facility] PT [address] [facility] [address] ([phone])       05/03/2017 12:00 PM     Appointment with Alex Cho at [facility] PT [address] [facility] [address] ([phone])       05/08/2017 10:30 AM     Appointment with Ismael Galvan at [facility] PT [address] [facility] [address] ([phone])       05/10/2017 12:00 PM     Appointment with Alex Cho at [facility] PT [address] [facility] [address] ([phone])       05/15/2017 11:30 AM     Appointment with Ismael Galvan at [facility] PT [address] [facility] [address] ([phone])       05/17/2017 11:30 AM     Appointment with Ismael Galvan at [facility] PT [address] [facility] [address] ([phone])         [facility] SERVICES! Dear Richar Molina: Thank you for requesting a [employer] account. Our records indicate that you already have an active [employer] account. You can access your account anytime at [email or web address]     Did you know that you can access your hospital and ER discharge instructions at any time in [employer]? You can also review all of your test results from your hospital stay or ER visit.     Additional Information    If you have questions, please visit the Frequently Asked Questions section of the [employer] website at [email or web address] Remember, [employer] is NOT to be used for urgent needs. For medical emergencies, dial 911. Now available from your iPhone and Android! Please provide this summary of care documentation to your next provider. Your primary care clinician is listed as Maximiliano Redder. If you have any questions after today's visit, please call [phone].
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
## 2017-04-27 NOTE — PROGRESS NOTES
PT DAILY TREATMENT NOTE - [facility] 2-15    Patient Name: Isis Velásquez  Date:2017  : 1970  [x]  Patient  Verified  Payor: Luis E Mcintyre / Plan: Jim Dunn RPN / Product Type: Commerical /    In time:4:05 PM  Out time:5:10 PM  Total Treatment Time (min): 65  Total Timed Codes (min): 50  1:1 Treatment Time ( only): -   Visit #: 2     Treatment Area: Back pain [M54.9]    SUBJECTIVE  Pain Level (0-10 scale): 7-810  Any medication changes, allergies to medications, adverse drug reactions, diagnosis change, or new procedure performed?: [x] No    [] Yes (see summary sheet for update)  Subjective functional status/changes:   [] No changes reported  Pt reports she in pain today from running around to different errands all morning. Pt stated she had a doctor's appointment this morning for a follow up appointment. Pt stated doctor told her to keep moving but to take it easy. Pt reported she is ready to go home and take her muscle relaxants and go to sleep. OBJECTIVE    Modality rationale: decrease inflammation and decrease pain to improve the patients ability to sit, stand, and ADL skills.    Min Type Additional Details   15 [x] Estim: []Att   [x]Unatt        []TENS instruct                  []IFC  [x]Premod   []NMES                     []Other:  []w/US   []w/ice   [x]w/heat  Position: supine  Location: neck/upper back, lower back    []  Traction: [] Cervical       []Lumbar                       [] Prone          []Supine                       []Intermittent   []Continuous Lbs:  [] before manual  [] after manual  []w/heat    []  Ultrasound: []Continuous   [] Pulsed at:                           []1MHz   []3MHz Location:  W/cm2:    [] Paraffin         Location:   []w/heat    []  Ice     []  Heat  []  Ice massage Position:  Location:    []  Laser  []  Other: Position:  Location:      []  Vasopneumatic Device Pressure:       [] lo [] med [] hi   Temperature:      [x] Skin assessment post-treatment:  [x]intact []redness- no adverse reaction    []redness - adverse reaction:     50 min Therapeutic Exercise:  [x] See flow sheet :   Rationale: increase ROM and increase strength to improve the patients ability to sit, stand, and ADL skills. With   [x] TE   [] TA   [] neuro   [] other: Patient Education: [x] Review HEP    [] Progressed/Changed HEP based on:   [] positioning   [] body mechanics   [] transfers   [] heat/ice application    [] other:      Other Objective/Functional Measures: Pt needed VCs for breathing while doing PPT. Pain Level (0-10 scale) post treatment: \"i feel better from the heat and estim\"    ASSESSMENT/Changes in Function:     Patient will continue to benefit from skilled PT services to modify and progress therapeutic interventions, address functional mobility deficits, address ROM deficits and address strength deficits to attain remaining goals. []  See Plan of Care  []  See progress note/recertification  []  See Discharge Summary         Progress towards goals / Updated goals:  Pt tolerated the addition of bike and stretches to today's treatment with no increase in pain.      PLAN  [x]  Upgrade activities as tolerated     [x]  Continue plan of care  [x]  Update interventions per flow sheet       []  Discharge due to:_  []  Other:_      Ling Kline 4/27/2017  4:07 PM  3 TE, 1 ES

## 2017-05-01 ENCOUNTER — TELEPHONE (OUTPATIENT)
Dept: FAMILY MEDICINE CLINIC | Age: 47
End: 2017-05-01

## 2017-05-01 ENCOUNTER — APPOINTMENT (OUTPATIENT)
Dept: PHYSICAL THERAPY | Age: 47
End: 2017-05-01
Payer: COMMERCIAL

## 2017-05-01 NOTE — TELEPHONE ENCOUNTER
Called and spoke with pt, and she has been advised and states understanding that her disability paperwork was faxed on 04/28/2017 and confirmation received. Pt states she needs a work note, also, due to her being out. Pt's letter has been mailed with return date of 06/01/2017 the same as disability paperwork. Pt is scheduled for 05/30/2017.

## 2017-05-01 NOTE — TELEPHONE ENCOUNTER
Pt picked up forms left at  and dropped off more forms to be completed for Disability insurance and requests call back at [phone] when complete.

## 2017-05-01 NOTE — TELEPHONE ENCOUNTER
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 Best (C) [phone].

## 2017-05-01 NOTE — LETTER
NOTIFICATION RETURN TO WORK / SCHOOL    5/1/2017 9:09 AM    Ms. Darvin Anderson  [address]  [address] [address] [phone]      To Whom It May Concern:    Darvin Anderson is currently under the care of [address] [place] [address]. She will return to work/school on: 06/01/2017, and should be excused starting 04/05/2017. If there are questions or concerns please have the patient contact our office.         Sincerely,      Alex Eason MD

## 2017-05-03 ENCOUNTER — HOSPITAL ENCOUNTER (OUTPATIENT)
Dept: PHYSICAL THERAPY | Age: 47
Discharge: HOME | End: 2017-05-03
Payer: COMMERCIAL

## 2017-05-03 PROCEDURE — 97110 THERAPEUTIC EXERCISES: CPT | Performed by: PHYSICAL MEDICINE & REHABILITATION

## 2017-05-03 PROCEDURE — 97014 ELECTRIC STIMULATION THERAPY: CPT | Performed by: PHYSICAL MEDICINE & REHABILITATION

## 2017-05-03 NOTE — PROGRESS NOTES
PT DAILY TREATMENT NOTE - [facility] 2-15    Patient Name: Esthela Thompson  Date:5/3/2017  : 1970  [x]  Patient  Verified  Payor: Summer Balloon / Plan: Roselia Rodríguez RPN / Product Type: Commerical /    In time:1210 PM  Out time:115 PM  Total Treatment Time (min): 65  Total Timed Codes (min): 50  1:1 Treatment Time ( only): -   Visit #: 3     Treatment Area: Back pain [M54.9]    SUBJECTIVE  Pain Level (0-10 scale): 5/10  Any medication changes, allergies to medications, adverse drug reactions, diagnosis change, or new procedure performed?: [x] No    [] Yes (see summary sheet for update)  Subjective functional status/changes:   [] No changes reported  Pt reports she is doing okay. Pt stated she has been in bed the past few days due to being sick. OBJECTIVE    Modality rationale: decrease inflammation and decrease pain to improve the patients ability to sit, stand, and ADL skills.    Min Type Additional Details   15 [x] Estim: []Att   [x]Unatt        []TENS instruct                  []IFC  [x]Premod   []NMES                     []Other:  []w/US   []w/ice   [x]w/heat  Position: supine  Location: neck/upper back, lower back    []  Traction: [] Cervical       []Lumbar                       [] Prone          []Supine                       []Intermittent   []Continuous Lbs:  [] before manual  [] after manual  []w/heat    []  Ultrasound: []Continuous   [] Pulsed at:                           []1MHz   []3MHz Location:  W/cm2:    [] Paraffin         Location:   []w/heat    []  Ice     []  Heat  []  Ice massage Position:  Location:    []  Laser  []  Other: Position:  Location:      []  Vasopneumatic Device Pressure:       [] lo [] med [] hi   Temperature:      [x] Skin assessment post-treatment:  [x]intact []redness- no adverse reaction    []redness - adverse reaction:     50 min Therapeutic Exercise:  [x] See flow sheet :   Rationale: increase ROM and increase strength to improve the patients ability to sit, stand, and ADL skills. With   [x] TE   [] TA   [] neuro   [] other: Patient Education: [x] Review HEP    [] Progressed/Changed HEP based on:   [] positioning   [] body mechanics   [] transfers   [] heat/ice application    [] other:      Other Objective/Functional Measures:  Pt tolerated today's standing exercises with max fatigue present. Updated HEP for trip to [address] [place] [address] this weekend. Pain Level (0-10 scale) post treatment: 0/10     ASSESSMENT/Changes in Function:     Patient will continue to benefit from skilled PT services to modify and progress therapeutic interventions, address functional mobility deficits, address ROM deficits and address strength deficits to attain remaining goals. []  See Plan of Care  []  See progress note/recertification  []  See Discharge Summary         Progress towards goals / Updated goals:  Pt is progressing slowly towards goals due to continued high pain levels and muscle fatigue.      PLAN  [x]  Upgrade activities as tolerated     [x]  Continue plan of care  [x]  Update interventions per flow sheet       []  Discharge due to:_  []  Other:_      Cindy Claudio PTA, CPT 5/3/2017  4:07 PM

## 2017-05-04 ENCOUNTER — PATIENT OUTREACH (OUTPATIENT)
Dept: FAMILY MEDICINE CLINIC | Age: 47
End: 2017-05-04

## 2017-05-04 NOTE — PROGRESS NOTES
Panel Manager will resolve 4/5/17 ED/Hospital Episode. Patient has been contacted and has followed up with PCP. No sign that patient has return to ED/Hospital in the last 30 days.

## 2017-05-08 ENCOUNTER — HOSPITAL ENCOUNTER (OUTPATIENT)
Dept: PHYSICAL THERAPY | Age: 47
Discharge: HOME | End: 2017-05-08
Payer: COMMERCIAL

## 2017-05-08 PROCEDURE — 97014 ELECTRIC STIMULATION THERAPY: CPT | Performed by: PHYSICAL MEDICINE & REHABILITATION

## 2017-05-08 PROCEDURE — 97110 THERAPEUTIC EXERCISES: CPT | Performed by: PHYSICAL MEDICINE & REHABILITATION

## 2017-05-08 NOTE — PROGRESS NOTES
PT DAILY TREATMENT NOTE - [facility] 2-15    Patient Name: Jenny Velásquez  Date:2017  : 1970  [x]  Patient  Verified  Payor: Kenny Sprain / Plan: Flor Garcia RPN / Product Type: Commerical /    In time:1050 AM  Out time:1205 PM  Total Treatment Time (min): 75  Total Timed Codes (min): 55  1:1 Treatment Time ( only): -   Visit #: 4     Treatment Area: Back pain [M54.9]    SUBJECTIVE  Pain Level (0-10 scale): 8/10  Any medication changes, allergies to medications, adverse drug reactions, diagnosis change, or new procedure performed?: [x] No    [] Yes (see summary sheet for update)  Subjective functional status/changes:   [] No changes reported  Pt reports she has been having more pain since over the weekend and doesn't know why. Pt stated she didn't do anything but stay in bed. OBJECTIVE    Modality rationale: decrease inflammation and decrease pain to improve the patients ability to sit, stand, and ADL skills.    Min Type Additional Details   20 [x] Estim: []Att   [x]Unatt        []TENS instruct                  []IFC  [x]Premod   []NMES                     []Other:  []w/US   []w/ice   [x]w/heat  Position: supine  Location: neck/upper back, lower back    []  Traction: [] Cervical       []Lumbar                       [] Prone          []Supine                       []Intermittent   []Continuous Lbs:  [] before manual  [] after manual  []w/heat    []  Ultrasound: []Continuous   [] Pulsed at:                           []1MHz   []3MHz Location:  W/cm2:    [] Paraffin         Location:   []w/heat    []  Ice     []  Heat  []  Ice massage Position:  Location:    []  Laser  []  Other: Position:  Location:      []  Vasopneumatic Device Pressure:       [] lo [] med [] hi   Temperature:      [x] Skin assessment post-treatment:  [x]intact []redness- no adverse reaction    []redness - adverse reaction:     55 min Therapeutic Exercise:  [x] See flow sheet :   Rationale: increase ROM and increase strength to improve the patients ability to sit, stand, and ADL skills. With   [x] TE   [] TA   [] neuro   [] other: Patient Education: [x] Review HEP    [] Progressed/Changed HEP based on:   [] positioning   [] body mechanics   [] transfers   [] heat/ice application    [] other:      Other Objective/Functional Measures:  Pt tolerated today's stretches with no increase in pain but also didn't get too much release. Pain Level (0-10 scale) post treatment: 6-7/10     ASSESSMENT/Changes in Function:     Patient will continue to benefit from skilled PT services to modify and progress therapeutic interventions, address functional mobility deficits, address ROM deficits and address strength deficits to attain remaining goals. []  See Plan of Care  []  See progress note/recertification  []  See Discharge Summary         Progress towards goals / Updated goals:  Pt is progressing slowly towards goals due to continued high pain levels and muscle fatigue.      PLAN  [x]  Upgrade activities as tolerated     [x]  Continue plan of care  [x]  Update interventions per flow sheet       []  Discharge due to:_  []  Other:_      Art Catalan PTA, CPT 5/8/2017  4:07 PM

## 2017-05-10 ENCOUNTER — HOSPITAL ENCOUNTER (OUTPATIENT)
Dept: PHYSICAL THERAPY | Age: 47
Discharge: HOME | End: 2017-05-10
Payer: COMMERCIAL

## 2017-05-10 PROCEDURE — 97014 ELECTRIC STIMULATION THERAPY: CPT | Performed by: PHYSICAL THERAPIST

## 2017-05-10 PROCEDURE — 97110 THERAPEUTIC EXERCISES: CPT | Performed by: PHYSICAL THERAPIST

## 2017-05-10 NOTE — PROGRESS NOTES
PT DAILY TREATMENT NOTE - [facility] 2-15    Patient Name: Ronel Velásquez  Date:5/10/2017  : 1970  [x]  Patient  Verified  Payor: Francisca Chance / Plan: Erin Sanabria RPN / Product Type: Commerical /    In time:12:05 PM  Out time:1:15 PM  Total Treatment Time (min): 70  Total Timed Codes (min): 55  1:1 Treatment Time ( [address] only): -   Visit #: 5     Treatment Area: Back pain [M54.9]    SUBJECTIVE  Pain Level (0-10 scale): 6  Any medication changes, allergies to medications, adverse drug reactions, diagnosis change, or new procedure performed?: [x] No    [] Yes (see summary sheet for update)  Subjective functional status/changes:   [] No changes reported  Pt reports she is a little better than when she was here the other day. Pt states that she had a lot of pain starting in the middle of her back and it goes down. Pt states that she has been having a few more headaches these days and that they are accompanied with dizziness which she use to never have. OBJECTIVE    Modality rationale: decrease inflammation and decrease pain to improve the patients ability to sit, stand, and ADLs.     Min Type Additional Details   15 [x] Estim: []Att   [x]Unatt        []TENS instruct                  []IFC  [x]Premod   []NMES                     []Other:  []w/US   []w/ice   [x]w/heat  Position: supine  Location: neck and middle back    []  Traction: [] Cervical       []Lumbar                       [] Prone          []Supine                       []Intermittent   []Continuous Lbs:  [] before manual  [] after manual  []w/heat    []  Ultrasound: []Continuous   [] Pulsed at:                           []1MHz   []3MHz Location:  W/cm2:    [] Paraffin         Location:   []w/heat    []  Ice     []  Heat  []  Ice massage Position:  Location:    []  Laser  []  Other: Position:  Location:      []  Vasopneumatic Device Pressure:       [] lo [] med [] hi   Temperature:      [x] Skin assessment post-treatment:  [x]intact []redness- no adverse reaction    []redness - adverse reaction:     55 min Therapeutic Exercise:  [x] See flow sheet :   Rationale: increase ROM and increase strength to improve the patients ability to sit, stand, and ADLs. With   [x] TE   [] TA   [] neuro   [] other: Patient Education: [x] Review HEP    [] Progressed/Changed HEP based on:   [] positioning   [] body mechanics   [] transfers   [] heat/ice application    [] other:      Other Objective/Functional Measures: Pt very TTP along UT and Levator. Pain Level (0-10 scale) post treatment: 5    ASSESSMENT/Changes in Function:     Patient will continue to benefit from skilled PT services to modify and progress therapeutic interventions, address functional mobility deficits, address ROM deficits and address strength deficits to attain remaining goals. []  See Plan of Care  []  See progress note/recertification  []  See Discharge Summary         Progress towards goals / Updated goals:  Pt was unable to tolerate any advancement today in exercises due to the increase in pain she was having through her neck and back.      PLAN  [x]  Upgrade activities as tolerated     [x]  Continue plan of care  [x]  Update interventions per flow sheet       []  Discharge due to:_  []  Other:_      MIGUEL De La O 5/10/2017  1:38 PM  4 TE, 1 ES

## 2017-05-15 ENCOUNTER — HOSPITAL ENCOUNTER (OUTPATIENT)
Dept: PHYSICAL THERAPY | Age: 47
Discharge: HOME | End: 2017-05-15
Payer: COMMERCIAL

## 2017-05-15 PROCEDURE — 97110 THERAPEUTIC EXERCISES: CPT | Performed by: PHYSICAL MEDICINE & REHABILITATION

## 2017-05-15 NOTE — PROGRESS NOTES
PT DAILY TREATMENT NOTE - [facility] 2-15    Patient Name: Emi Velásquez  Date:5/15/2017  : 1970  [x]  Patient  Verified  Payor: Casey Suárez / Plan: Fernandez Burrows RPN / Product Type: Commerical /    In time:1150 AM  Out time:1250 PM  Total Treatment Time (min): 60  Total Timed Codes (min): 45  1:1 Treatment Time ([address] only): -   Visit #: 6     Treatment Area: Back pain [M54.9]    SUBJECTIVE  Pain Level (0-10 scale): 6  Any medication changes, allergies to medications, adverse drug reactions, diagnosis change, or new procedure performed?: [x] No    [] Yes (see summary sheet for update)  Subjective functional status/changes:   [] No changes reported  Pt reports she is feeling about the same. OBJECTIVE    Modality rationale: decrease inflammation and decrease pain to improve the patients ability to sit, stand, and ADLs. Min Type Additional Details    [] Estim: []Att   []Unatt        []TENS instruct                  []IFC  []Premod   []NMES                     []Other:  []w/US   []w/ice   []w/heat  Position:   Location:     []  Traction: [] Cervical       []Lumbar                       [] Prone          []Supine                       []Intermittent   []Continuous Lbs:  [] before manual  [] after manual  []w/heat    []  Ultrasound: []Continuous   [] Pulsed at:                           []1MHz   []3MHz Location:  W/cm2:    [] Paraffin         Location:   []w/heat   15 []  Ice     [x]  Heat  []  Ice massage Position: supine  Location: low back    []  Laser  []  Other: Position:  Location:      []  Vasopneumatic Device Pressure:       [] lo [] med [] hi   Temperature:      [x] Skin assessment post-treatment:  [x]intact []redness- no adverse reaction    []redness - adverse reaction:     45 min Therapeutic Exercise:  [x] See flow sheet :   Rationale: increase ROM and increase strength to improve the patients ability to sit, stand, and ADLs.            With   [x] TE   [] TA   [] neuro   [] other: Patient Education: [x] Review HEP    [] Progressed/Changed HEP based on:   [] positioning   [] body mechanics   [] transfers   [] heat/ice application    [] other:      Other Objective/Functional Measures:   Pt unable to resume LE strengthening due to higher pain levels today. Pain Level (0-10 scale) post treatment: 4    ASSESSMENT/Changes in Function:     Patient will continue to benefit from skilled PT services to modify and progress therapeutic interventions, address functional mobility deficits, address ROM deficits, address strength deficits, analyze and address soft tissue restrictions, analyze and cue movement patterns, analyze and modify body mechanics/ergonomics and assess and modify postural abnormalities to attain remaining goals. []  See Plan of Care  []  See progress note/recertification  []  See Discharge Summary         Progress towards goals / Updated goals:  Pt is progressing slowly towards goals due to continues high pain levels. Pt showed up 20 minutes late to appointment.     PLAN  [x]  Upgrade activities as tolerated     [x]  Continue plan of care  [x]  Update interventions per flow sheet       []  Discharge due to:_  []  Other:_      Jaye Llamas PTA, CPT 5/15/2017  1:38 PM

## 2017-05-17 ENCOUNTER — HOSPITAL ENCOUNTER (OUTPATIENT)
Dept: PHYSICAL THERAPY | Age: 47
Discharge: HOME | End: 2017-05-17
Payer: COMMERCIAL

## 2017-05-17 PROCEDURE — 97014 ELECTRIC STIMULATION THERAPY: CPT | Performed by: PHYSICAL MEDICINE & REHABILITATION

## 2017-05-17 PROCEDURE — 97110 THERAPEUTIC EXERCISES: CPT | Performed by: PHYSICAL MEDICINE & REHABILITATION

## 2017-05-17 NOTE — PROGRESS NOTES
PT DAILY TREATMENT NOTE - [facility] 2-15    Patient Name: Isis Velásquez  Date:2017  : 1970  [x]  Patient  Verified  Payor: Luis E Mcintyre / Plan: Jim Dunn RPN / Product Type: Commerical /    In time:1155 AM  Out time:1255 PM  Total Treatment Time (min): 60  Total Timed Codes (min): 45  1:1 Treatment Time ( [address] only): -   Visit #: 7     Treatment Area: Back pain [M54.9]    SUBJECTIVE  Pain Level (0-10 scale): 8/10 low back 4/10 neck  Any medication changes, allergies to medications, adverse drug reactions, diagnosis change, or new procedure performed?: [x] No    [] Yes (see summary sheet for update)  Subjective functional status/changes:   [] No changes reported  Pt reports her low back continues to hurt a lot. Pt stated she just goes home and sleeps or lays in bed. I advised pt to perform HEP 3-4 times a day at least and to keep muscles from tightening up. Pt stated she wants to get an MRI. OBJECTIVE    Modality rationale: decrease inflammation and decrease pain to improve the patients ability to sit, stand, and ADLs.     Min Type Additional Details   15 [x] Estim: []Att   [x]Unatt        []TENS instruct                  []IFC  [x]Premod   []NMES                     []Other:  []w/US   []w/ice   [x]w/heat  Position: supine   Location: neck and back    []  Traction: [] Cervical       []Lumbar                       [] Prone          []Supine                       []Intermittent   []Continuous Lbs:  [] before manual  [] after manual  []w/heat    []  Ultrasound: []Continuous   [] Pulsed at:                           []1MHz   []3MHz Location:  W/cm2:    [] Paraffin         Location:   []w/heat    []  Ice     []  Heat  []  Ice massage Position:  Location:    []  Laser  []  Other: Position:  Location:      []  Vasopneumatic Device Pressure:       [] lo [] med [] hi   Temperature:      [x] Skin assessment post-treatment:  [x]intact []redness- no adverse reaction    []redness - adverse reaction:     45 min Therapeutic Exercise:  [x] See flow sheet :   Rationale: increase ROM and increase strength to improve the patients ability to sit, stand, and ADLs. With   [x] TE   [] TA   [] neuro   [] other: Patient Education: [x] Review HEP    [] Progressed/Changed HEP based on:   [] positioning   [] body mechanics   [] transfers   [] heat/ice application    [] other:      Other Objective/Functional Measures:   Reminded pt multiple times to perform HEP throughout the day and keep moving. Pain Level (0-10 scale) post treatment: 4    ASSESSMENT/Changes in Function:     Patient will continue to benefit from skilled PT services to modify and progress therapeutic interventions, address functional mobility deficits, address ROM deficits, address strength deficits, analyze and address soft tissue restrictions, analyze and cue movement patterns, analyze and modify body mechanics/ergonomics and assess and modify postural abnormalities to attain remaining goals. []  See Plan of Care  []  See progress note/recertification  []  See Discharge Summary         Progress towards goals / Updated goals:  Pt is progressing slowly towards goals due to continues high pain levels. Pt showed up 25 minutes late to appointment.     PLAN  [x]  Upgrade activities as tolerated     [x]  Continue plan of care  [x]  Update interventions per flow sheet       []  Discharge due to:_  []  Other:_      David El PTA, CPT 5/17/2017  1:38 PM

## 2017-05-22 ENCOUNTER — HOSPITAL ENCOUNTER (OUTPATIENT)
Dept: PHYSICAL THERAPY | Age: 47
Discharge: HOME | End: 2017-05-22
Payer: COMMERCIAL

## 2017-05-22 PROCEDURE — 97140 MANUAL THERAPY 1/> REGIONS: CPT | Performed by: PHYSICAL MEDICINE & REHABILITATION

## 2017-05-22 PROCEDURE — 97110 THERAPEUTIC EXERCISES: CPT | Performed by: PHYSICAL MEDICINE & REHABILITATION

## 2017-05-22 PROCEDURE — 97014 ELECTRIC STIMULATION THERAPY: CPT | Performed by: PHYSICAL MEDICINE & REHABILITATION

## 2017-05-22 NOTE — PROGRESS NOTES
PT DAILY TREATMENT NOTE - [facility] 2-15    Patient Name: Gaby Winter  Date:2017  : 1970  [x]  Patient  Verified  Payor: Hayde Prom / Plan: Trista Hager RPN / Product Type: Commerical /    In time:415 PM  Out time:525 PM  Total Treatment Time (min): 70  Total Timed Codes (min): 55  1:1 Treatment Time ( only): -   Visit #: 8     Treatment Area: Back pain [M54.9]    SUBJECTIVE  Pain Level (0-10 scale): 8/10 low back 9/10 neck  Any medication changes, allergies to medications, adverse drug reactions, diagnosis change, or new procedure performed?: [x] No    [] Yes (see summary sheet for update)  Subjective functional status/changes:   [] No changes reported  Pt reports she has been hurting more since the weekend with some numbness and tingling down the arm to thumb and first digit. OBJECTIVE    Modality rationale: decrease inflammation and decrease pain to improve the patients ability to sit, stand, and ADLs.     Min Type Additional Details   15 [x] Estim: []Att   [x]Unatt        []TENS instruct                  []IFC  [x]Premod   []NMES                     []Other:  []w/US   []w/ice   [x]w/heat  Position: supine   Location: neck and back    []  Traction: [] Cervical       []Lumbar                       [] Prone          []Supine                       []Intermittent   []Continuous Lbs:  [] before manual  [] after manual  []w/heat    []  Ultrasound: []Continuous   [] Pulsed at:                           []1MHz   []3MHz Location:  W/cm2:    [] Paraffin         Location:   []w/heat    []  Ice     []  Heat  []  Ice massage Position:  Location:    []  Laser  []  Other: Position:  Location:      []  Vasopneumatic Device Pressure:       [] lo [] med [] hi   Temperature:      [x] Skin assessment post-treatment:  [x]intact []redness- no adverse reaction    []redness - adverse reaction:     45 min Therapeutic Exercise:  [x] See flow sheet :   Rationale: increase ROM and increase strength to improve the patients ability to sit, stand, and ADLs. 10 min Manual Therapy: Desensitization of UT and Levator with gentle touch then gradually STM. Rationale: increase ROM and decrease trigger points to improve the patients ability to sit, stand, and ADLs. With   [x] TE   [] TA   [] neuro   [] other: Patient Education: [x] Review HEP    [] Progressed/Changed HEP based on:   [] positioning   [] body mechanics   [] transfers   [] heat/ice application    [] other:      Other Objective/Functional Measures:   Pt unable to tolerate much of any pressure with manual today. Pain Level (0-10 scale) post treatment: 6    ASSESSMENT/Changes in Function:     Patient will continue to benefit from skilled PT services to modify and progress therapeutic interventions, address functional mobility deficits, address ROM deficits, address strength deficits, analyze and address soft tissue restrictions, analyze and cue movement patterns, analyze and modify body mechanics/ergonomics and assess and modify postural abnormalities to attain remaining goals. []  See Plan of Care  []  See progress note/recertification  []  See Discharge Summary         Progress towards goals / Updated goals:  Pt has made little to no progress.     PLAN  [x]  Upgrade activities as tolerated     [x]  Continue plan of care  [x]  Update interventions per flow sheet       []  Discharge due to:_  []  Other:_      Wanda Geller PTA, CPT 5/22/2017  1:38 PM

## 2017-05-24 ENCOUNTER — HOSPITAL ENCOUNTER (OUTPATIENT)
Dept: PHYSICAL THERAPY | Age: 47
Discharge: HOME | End: 2017-05-24
Payer: COMMERCIAL

## 2017-05-24 PROCEDURE — 97014 ELECTRIC STIMULATION THERAPY: CPT | Performed by: PHYSICAL MEDICINE & REHABILITATION

## 2017-05-24 PROCEDURE — 97110 THERAPEUTIC EXERCISES: CPT | Performed by: PHYSICAL MEDICINE & REHABILITATION

## 2017-05-24 NOTE — PROGRESS NOTES
PT DAILY TREATMENT NOTE - [facility] 2-15    Patient Name: Zaria Velásquez  Date:2017  : 1970  [x]  Patient  Verified  Payor: Peg Soto / Plan: Elvia GOMEZ / Product Type: Commerical /    In time:845 AM  Out time:945 AM  Total Treatment Time (min): 60  Total Timed Codes (min): 45  1:1 Treatment Time ([facility] only): -   Visit #: 9     Treatment Area: Back pain [M54.9]    SUBJECTIVE  Pain Level (0-10 scale): 5/10 low back 5/10 neck  Any medication changes, allergies to medications, adverse drug reactions, diagnosis change, or new procedure performed?: [x] No    [] Yes (see summary sheet for update)  Subjective functional status/changes:   [] No changes reported  Pt reports she is doing better today since she took her muscle relaxer medicine. OBJECTIVE    Modality rationale: decrease inflammation and decrease pain to improve the patients ability to sit, stand, and ADLs. Min Type Additional Details   15 [x] Estim: []Att   [x]Unatt        []TENS instruct                  []IFC  [x]Premod   []NMES                     []Other:  []w/US   []w/ice   [x]w/heat  Position: supine   Location: neck and back    []  Traction: [] Cervical       []Lumbar                       [] Prone          []Supine                       []Intermittent   []Continuous Lbs:  [] before manual  [] after manual  []w/heat    []  Ultrasound: []Continuous   [] Pulsed at:                           []1MHz   []3MHz Location:  W/cm2:    [] Paraffin         Location:   []w/heat    []  Ice     []  Heat  []  Ice massage Position:  Location:    []  Laser  []  Other: Position:  Location:      []  Vasopneumatic Device Pressure:       [] lo [] med [] hi   Temperature:      [x] Skin assessment post-treatment:  [x]intact []redness- no adverse reaction    []redness - adverse reaction:     45 min Therapeutic Exercise:  [x] See flow sheet :   Rationale: increase ROM and increase strength to improve the patients ability to sit, stand, and ADLs. With   [x] TE   [] TA   [] neuro   [] other: Patient Education: [x] Review HEP    [] Progressed/Changed HEP based on:   [] positioning   [] body mechanics   [] transfers   [] heat/ice application    [] other:      Other Objective/Functional Measures:  Pt tolerated step ups well with no increase in pain, pt had max fatigue with 10 reps of standing hip ABD and due to tensing, had some neck pain with minisquats but was able to perform 2x10. Cervical AROM    R L  Rot  45 60  SB  20 30    Lumbar AROM  Flexion: 75%  Ext: 50%  SB: 50% B    Pain Level (0-10 scale) post treatment: 4    ASSESSMENT/Changes in Function:     Patient will continue to benefit from skilled PT services to modify and progress therapeutic interventions, address functional mobility deficits, address ROM deficits, address strength deficits, analyze and address soft tissue restrictions, analyze and cue movement patterns, analyze and modify body mechanics/ergonomics and assess and modify postural abnormalities to attain remaining goals. []  See Plan of Care  []  See progress note/recertification  []  See Discharge Summary         Progress towards goals / Updated goals:  Pt has made little to no progress due to continued high pain levels. Pt states she is doing her HEP but there is no carry over from one visit to another. Will wait for MD instructions.     PLAN  [x]  Upgrade activities as tolerated     [x]  Continue plan of care  [x]  Update interventions per flow sheet       []  Discharge due to:_  []  Other:_      Benita Anderson PTA, CPT 5/24/2017  1:38 PM

## 2017-05-30 ENCOUNTER — OFFICE VISIT (OUTPATIENT)
Dept: FAMILY MEDICINE CLINIC | Age: 47
End: 2017-05-30

## 2017-05-30 VITALS
DIASTOLIC BLOOD PRESSURE: 81 MMHG | SYSTOLIC BLOOD PRESSURE: 111 MMHG | WEIGHT: 188 LBS | TEMPERATURE: 97.7 F | BODY MASS INDEX: 30.22 KG/M2 | HEIGHT: 66 IN | RESPIRATION RATE: 20 BRPM | HEART RATE: 72 BPM

## 2017-05-30 DIAGNOSIS — E78.00 HYPERCHOLESTEREMIA: ICD-10-CM

## 2017-05-30 DIAGNOSIS — E55.9 VITAMIN D DEFICIENCY: ICD-10-CM

## 2017-05-30 DIAGNOSIS — G44.209 MUSCLE CONTRACTION HEADACHE: ICD-10-CM

## 2017-05-30 DIAGNOSIS — M54.9 BACK PAIN, UNSPECIFIED BACK LOCATION, UNSPECIFIED BACK PAIN LATERALITY, UNSPECIFIED CHRONICITY: ICD-10-CM

## 2017-05-30 DIAGNOSIS — Z00.00 ROUTINE GENERAL MEDICAL EXAMINATION AT A HEALTH CARE FACILITY: Primary | ICD-10-CM

## 2017-05-30 DIAGNOSIS — M54.2 NECK PAIN: ICD-10-CM

## 2017-05-30 NOTE — MR AVS SNAPSHOT
Visit Information     Date & Time Provider Department Dept. Phone Encounter #    5/30/2017 10:00 AM Kay Short MD [address] [id number]      Follow-up Instructions     Return in about 1 year (around 5/30/2018) for physical.      Upcoming Health Maintenance        Date Due    PAP AKA CERVICAL CYTOLOGY 10/9/1992    INFLUENZA AGE 9 TO ADULT 8/1/2017    DTaP/Tdap/Td series (2 - Td) 3/28/2027      Allergies as of 5/30/2017  Review Complete On: 5/30/2017 By: Kay Short MD       Severity Noted Reaction Type Reactions    Latex Medium 01/04/2013   Topical Rash    Aspirin  02/12/2017    Other (comments)    \"It doesn't digest in my system\"      Current Immunizations  Reviewed on 3/28/2017    No immunizations on file.        Not reviewed this visit   You Were Diagnosed With        Codes Comments    Routine general medical examination at a health care facility    -  Primary ICD-10-CM: Z00.00  ICD-9-CM: V70.0     Hypercholesteremia     ICD-10-CM: E78.00  ICD-9-CM: 272.0     Vitamin D deficiency     ICD-10-CM: E55.9  ICD-9-CM: 268.9     Neck pain     ICD-10-CM: M54.2  ICD-9-CM: 723.1     Back pain, unspecified back location, unspecified back pain laterality, unspecified chronicity     ICD-10-CM: M54.9  ICD-9-CM: 724.5     Muscle contraction headache     ICD-10-CM: G44.209  ICD-9-CM: 307.81       Vitals     BP Pulse Temp Resp Height(growth percentile) Weight(growth percentile)    111/81 72 97.7 °F (36.5 °C) (Oral) 20 5' 5.5\" (1.664 m) 188 lb (85.3 kg)    BMI OB Status Smoking Status             30.81 kg/m2 Hysterectomy Never Smoker         BMI and BSA Data     Body Mass Index Body Surface Area    30.81 kg/m 2 1.99 m 2         Preferred Pharmacy       Pharmacy Name Phone    [facility] [address] [phone]         Your Updated Medication List          This list is accurate as of: 5/30/17 11:27 AM.  Always use your most recent med list.                butalbital-acetaminophen-caff -40 mg per capsule   Commonly known as:  FIORICET   TAKE 1 TO 2 CAPSULE BY MOUTH EVERY 8 HOURS AS NEEDED FOR HEADACHES       methocarbamol 500 mg tablet   Commonly known as:  ROBAXIN   Take 1 Tab by mouth every six (6) hours as needed. temazepam 15 mg capsule   Commonly known as:  RESTORIL   TAKE 1 TO 2 CAPSULE BY MOUTH EVERY NIGHT AT BEDTIME AS NEEDED FOR SLEEP               We Performed the Following     CBC WITH AUTOMATED DIFF [41182 CPT(R)]     LIPID PANEL [43105 CPT(R)]     METABOLIC PANEL, COMPREHENSIVE [42763 CPT(R)]     REFERRAL TO ORTHOPEDIC SURGERY [NKK42 Custom]     Comments:    Please evaluate patient for neck and back pain with related tension headaches. VITAMIN D, 25 HYDROXY [26584 CPT(R)]       Follow-up Instructions     Return in about 1 year (around 5/30/2018) for physical.         Referral Information     Referral ID Referred By Referred To       [id number] [facility] [address], [address] [facility]       Phone: [phone]       Fax: [phone]         Visits Status Start Date End Date    1 New Request 5/30/17 5/30/18    If your referral has a status of pending review or denied, additional information will be sent to support the outcome of this decision. Introducing \[facility]\"" & HEALTH SERVICES! Dear Luisa Booker: Thank you for requesting a [employer] account. Our records indicate that you already have an active [employer] account. You can access your account anytime at [email or web address]     Did you know that you can access your hospital and ER discharge instructions at any time in [employer]? You can also review all of your test results from your hospital stay or ER visit.     Additional Information    If you have questions, please visit the Frequently Asked Questions section of the [employer] website at [email or web address] Remember, [employer] is NOT to be used for urgent needs. For medical emergencies, dial 911. Now available from your iPhone and Android! Please provide this summary of care documentation to your next provider. Your primary care clinician is listed as Del Rivera. If you have any questions after today's visit, please call [phone].

## 2017-05-30 NOTE — PROGRESS NOTES
Chief Complaint   Patient presents with    Well Woman     No PAP- fasting labs     Back Pain     follow up

## 2017-05-30 NOTE — PROGRESS NOTES
Subjective:   Gaby Winter is a 40 y.o. y.o. female here for her annual routine checkup. She is due to follow up up with her Gynecology for her annual checkup. Also, she is in PT for neck and back pain with related tension headaches. She has made minimal progress. No LMP recorded. Patient has had a hysterectomy. Social History: single partner, contraception - status post hysterectomy. Pertinent past medical hstory: no history of HTN, DVT, CAD, DM, liver disease, migraines or smoking. Health Habits/Lifestyle  Occupation:   for [employer] members:  2, patient and her daughter  Doing a monthly breast self exam:  no  Last dental appointment:   6 months ago  Last eye exam:  She has an appointment next week  Uses seatbelts regularly :  yes  Getting regular exercise:  no  Last mammogram:  This past December    Patient Active Problem List    Diagnosis Date Noted    Chronic insomnia 03/28/2017    Non morbid obesity 03/28/2017    Vitamin D deficiency 10/20/2015    Allergic rhinitis 06/14/2013    Hypercholesteremia 01/23/2012    Allergy 07/14/2011    Asthma 07/14/2011    Migraines 07/14/2011    GERD (gastroesophageal reflux disease) 07/14/2011    Headache 07/14/2011    Cerebral vascular malformation 07/14/2011     Current Outpatient Prescriptions   Medication Sig Dispense Refill    methocarbamol (ROBAXIN) 500 mg tablet Take 1 Tab by mouth every six (6) hours as needed.  120 Tab 11    temazepam (RESTORIL) 15 mg capsule TAKE 1 TO 2 CAPSULE BY MOUTH EVERY NIGHT AT BEDTIME AS NEEDED FOR SLEEP 60 Cap 0    butalbital-acetaminophen-caff (FIORICET) -40 mg per capsule TAKE 1 TO 2 CAPSULE BY MOUTH EVERY 8 HOURS AS NEEDED FOR HEADACHES 40 Cap 0     Allergies   Allergen Reactions    Latex Rash    Aspirin Other (comments)     \"It doesn't digest in my system\"     Past Medical History:   Diagnosis Date    Allergic rhinitis 6/14/2013    Asthma     Chronic insomnia 3/28/2017    GERD (gastroesophageal reflux disease)     Headache     Headache     Hypercholesteremia 1/23/2012    Non morbid obesity 3/28/2017    Snoring     Vitamin D deficiency 10/20/2015     Past Surgical History:   Procedure Laterality Date    ENDOSCOPY, COLON, DIAGNOSTIC  12/03 & 04/09    HX GYN      Laproscopy, BTL,TVH    HX GYN      hysterectomy     Family History   Problem Relation Age of Onset    Hypertension Father     Kidney Disease Maternal Grandmother     Hypertension Maternal Grandmother     Heart Attack Paternal Grandfather     Hypertension Mother     Cancer Maternal Grandfather      lung     Social History   Substance Use Topics    Smoking status: Never Smoker    Smokeless tobacco: Never Used    Alcohol use Yes      Comment: Occasionally        ROS:  Feeling well. No dyspnea or chest pain on exertion. No abdominal pain, change in bowel habits, black or bloody stools. No urinary tract symptoms. GYN ROS: normal menses, no abnormal bleeding, pelvic pain or discharge, no breast pain or new or enlarging lumps on self exam. No neurological complaints, except her headaches, which are about the same. Objective:  Visit Vitals    /81    Pulse 72    Temp 97.7 °F (36.5 °C) (Oral)    Resp 20    Ht 5' 5.5\" (1.664 m)    Wt 188 lb (85.3 kg)    BMI 30.81 kg/m2     The patient appears well, alert, oriented x 3, in no distress. ENT normal.  Neck supple. No adenopathy or thyromegaly. KANNAN. Lungs are clear, good air entry, no wheezes, rhonchi or rales. S1 and S2 normal, no murmurs, regular rate and rhythm. Abdomen soft without tenderness, guarding, mass or organomegaly. Extremities show no edema, normal peripheral pulses. Neurological is normal, no focal findings. BREAST EXAM: deferred to gyn    PELVIC EXAM: deferred to gyn    Assessment/Plan:    ICD-10-CM ICD-9-CM    1.  Routine general medical examination at a health care facility J16.28 K56.4 METABOLIC PANEL, COMPREHENSIVE      CBC WITH AUTOMATED DIFF   2. Hypercholesteremia G54.34 950.7 METABOLIC PANEL, COMPREHENSIVE      LIPID PANEL   3. Vitamin D deficiency E55.9 268.9 VITAMIN D, 25 HYDROXY   4. Neck pain M54.2 723.1 REFERRAL TO ORTHOPEDIC SURGERY   5. Back pain, unspecified back location, unspecified back pain laterality, unspecified chronicity M54.9 724.5 REFERRAL TO ORTHOPEDIC SURGERY   6. Muscle contraction headache G44.209 307.81 REFERRAL TO ORTHOPEDIC SURGERY         Breast awareness  Regular exercise  Labs per orders. Orthopedics referral    Follow-up Disposition:  Return in about 1 year (around 5/30/2018) for physical..      Reviewed plan of care. Patient has provided input and agrees with goals.

## 2017-05-31 ENCOUNTER — TELEPHONE (OUTPATIENT)
Dept: FAMILY MEDICINE CLINIC | Age: 47
End: 2017-05-31

## 2017-05-31 LAB
25(OH)D3+25(OH)D2 SERPL-MCNC: 21.8 NG/ML (ref 30–100)
ALBUMIN SERPL-MCNC: 4.5 G/DL (ref 3.5–5.5)
ALBUMIN/GLOB SERPL: 1.6 {RATIO} (ref 1.2–2.2)
ALP SERPL-CCNC: 68 IU/L (ref 39–117)
ALT SERPL-CCNC: 12 IU/L (ref 0–32)
AST SERPL-CCNC: 20 IU/L (ref 0–40)
BASOPHILS # BLD AUTO: 0.1 X10E3/UL (ref 0–0.2)
BASOPHILS NFR BLD AUTO: 1 %
BILIRUB SERPL-MCNC: 0.4 MG/DL (ref 0–1.2)
BUN SERPL-MCNC: 12 MG/DL (ref 6–24)
BUN/CREAT SERPL: 14 (ref 9–23)
CALCIUM SERPL-MCNC: 9.4 MG/DL (ref 8.7–10.2)
CHLORIDE SERPL-SCNC: 99 MMOL/L (ref 96–106)
CHOLEST SERPL-MCNC: 247 MG/DL (ref 100–199)
CO2 SERPL-SCNC: 23 MMOL/L (ref 18–29)
CREAT SERPL-MCNC: 0.85 MG/DL (ref 0.57–1)
EOSINOPHIL # BLD AUTO: 0.4 X10E3/UL (ref 0–0.4)
EOSINOPHIL NFR BLD AUTO: 4 %
ERYTHROCYTE [DISTWIDTH] IN BLOOD BY AUTOMATED COUNT: 14.5 % (ref 12.3–15.4)
GLOBULIN SER CALC-MCNC: 2.9 G/DL (ref 1.5–4.5)
GLUCOSE SERPL-MCNC: 80 MG/DL (ref 65–99)
HCT VFR BLD AUTO: 43 % (ref 34–46.6)
HDLC SERPL-MCNC: 85 MG/DL
HGB BLD-MCNC: 13.6 G/DL (ref 11.1–15.9)
IMM GRANULOCYTES # BLD: 0 X10E3/UL (ref 0–0.1)
IMM GRANULOCYTES NFR BLD: 0 %
INTERPRETATION, 910389: NORMAL
LDLC SERPL CALC-MCNC: 141 MG/DL (ref 0–99)
LYMPHOCYTES # BLD AUTO: 3.1 X10E3/UL (ref 0.7–3.1)
LYMPHOCYTES NFR BLD AUTO: 29 %
MCH RBC QN AUTO: 29.2 PG (ref 26.6–33)
MCHC RBC AUTO-ENTMCNC: 31.6 G/DL (ref 31.5–35.7)
MCV RBC AUTO: 92 FL (ref 79–97)
MONOCYTES # BLD AUTO: 0.9 X10E3/UL (ref 0.1–0.9)
MONOCYTES NFR BLD AUTO: 8 %
NEUTROPHILS # BLD AUTO: 6.2 X10E3/UL (ref 1.4–7)
NEUTROPHILS NFR BLD AUTO: 58 %
PLATELET # BLD AUTO: 398 X10E3/UL (ref 150–379)
POTASSIUM SERPL-SCNC: 4.6 MMOL/L (ref 3.5–5.2)
PROT SERPL-MCNC: 7.4 G/DL (ref 6–8.5)
RBC # BLD AUTO: 4.66 X10E6/UL (ref 3.77–5.28)
SODIUM SERPL-SCNC: 144 MMOL/L (ref 134–144)
TRIGL SERPL-MCNC: 107 MG/DL (ref 0–149)
VLDLC SERPL CALC-MCNC: 21 MG/DL (ref 5–40)
WBC # BLD AUTO: 10.6 X10E3/UL (ref 3.4–10.8)

## 2017-05-31 NOTE — TELEPHONE ENCOUNTER
Pt called to advise of appointment with Dr. Sindhu Zarate on 6/14/17 and to request note excusing her from work until at least 6/14/17 for back pain. Pt says she's also stopping by today to drop off more disability forms to be completed.  Letty

## 2017-05-31 NOTE — LETTER
NOTIFICATION RETURN TO WORK / SCHOOL    5/31/2017 5:06 PM    Ms. Mariana Verma  [address]  [address] [facility] [phone]      To Whom It May Concern:    Mariana Verma is currently under the care of [address] [place] [address]. She will return to work/school on: 6/15/17. If there are questions or concerns please have the patient contact our office.         Sincerely,      Navi Caba MD

## 2017-06-04 DIAGNOSIS — E55.9 VITAMIN D DEFICIENCY: Primary | ICD-10-CM

## 2017-06-04 RX ORDER — ERGOCALCIFEROL 1.25 MG/1
50000 CAPSULE ORAL
Qty: 24 CAP | Refills: 0 | Status: SHIPPED | OUTPATIENT
Start: 2017-06-05 | End: 2017-08-25

## 2017-06-14 ENCOUNTER — TELEPHONE (OUTPATIENT)
Dept: FAMILY MEDICINE CLINIC | Age: 47
End: 2017-06-14

## 2017-06-16 NOTE — TELEPHONE ENCOUNTER
Pt called back asking if the work note can be finished today. She is also requesting the disability forms to be faxed.  Please advise [phone]

## 2017-06-16 NOTE — TELEPHONE ENCOUNTER
Pt called stating she would like to know if Dr. Rosi Jenkins completed and faxed disability forms. She also would like to know if Dr. Rosi Jenkins completed a work note for her.  Please advise [phone]

## 2017-06-19 NOTE — TELEPHONE ENCOUNTER
Are you going to approve pt to be out of work until 06/23/2017 due to not having money, for orthopedic appointment, and having to reschedule?

## 2017-08-05 RX ORDER — PRAVASTATIN SODIUM 20 MG/1
TABLET ORAL
Qty: 30 TAB | Refills: 9 | Status: SHIPPED | OUTPATIENT
Start: 2017-08-05 | End: 2017-08-07 | Stop reason: SDUPTHER

## 2017-08-07 RX ORDER — PRAVASTATIN SODIUM 20 MG/1
TABLET ORAL
Qty: 30 TAB | Refills: 9 | Status: SHIPPED | OUTPATIENT
Start: 2017-08-07 | End: 2018-05-15 | Stop reason: SDUPTHER

## 2017-08-27 ENCOUNTER — HOSPITAL ENCOUNTER (EMERGENCY)
Age: 47
Discharge: HOME | End: 2017-08-27
Attending: EMERGENCY MEDICINE
Payer: SUBSIDIZED

## 2017-08-27 ENCOUNTER — APPOINTMENT (OUTPATIENT)
Dept: GENERAL RADIOLOGY | Age: 47
End: 2017-08-27
Attending: STUDENT IN AN ORGANIZED HEALTH CARE EDUCATION/TRAINING PROGRAM
Payer: SUBSIDIZED

## 2017-08-27 VITALS
TEMPERATURE: 98.3 F | HEART RATE: 69 BPM | BODY MASS INDEX: 31.32 KG/M2 | RESPIRATION RATE: 18 BRPM | SYSTOLIC BLOOD PRESSURE: 121 MMHG | OXYGEN SATURATION: 99 % | WEIGHT: 188 LBS | HEIGHT: 65 IN | DIASTOLIC BLOOD PRESSURE: 77 MMHG

## 2017-08-27 DIAGNOSIS — S96.912A STRAIN OF FOOT, LEFT, INITIAL ENCOUNTER: Primary | ICD-10-CM

## 2017-08-27 PROCEDURE — 99283 EMERGENCY DEPT VISIT LOW MDM: CPT

## 2017-08-27 PROCEDURE — 73630 X-RAY EXAM OF FOOT: CPT

## 2017-08-27 RX ORDER — HYDROCODONE BITARTRATE AND ACETAMINOPHEN 5; 325 MG/1; MG/1
1 TABLET ORAL
Qty: 20 TAB | Refills: 0 | Status: SHIPPED | OUTPATIENT
Start: 2017-08-27 | End: 2017-10-05

## 2017-08-27 NOTE — ED PROVIDER NOTES
HPI Comments: 40 y.o. female with past medical history significant for HA, asthma, GERD, hypercholesterolemia, HA,  who presents from home with friend at bedside with chief complaint of left foot pain. The pt c/o left foot pain that is worsened with walking that started 2 days ago while walking. The pt saw ecchymosis to her left foot as well. The pt started to walk more last week to lose more weight and has been walking about 2 miles. The pt doesn't remember injuring the foot. There are no other acute medical concerns at this time. Social hx: nonsmoker, occasional EtOH use  PCP: Gab Casper MD    Note written by Mundo Neal, as dictated by Albina Quintanilla MD 1:23 PM      The history is provided by the patient. No  was used. Past Medical History:   Diagnosis Date    Allergic rhinitis 6/14/2013    Asthma     Chronic insomnia 3/28/2017    GERD (gastroesophageal reflux disease)     Headache     Headache     Hypercholesteremia 1/23/2012    Non morbid obesity 3/28/2017    Snoring     Vitamin D deficiency 10/20/2015       Past Surgical History:   Procedure Laterality Date    ENDOSCOPY, COLON, DIAGNOSTIC  12/03 & 04/09    HX GYN      Laproscopy, BTL,TVH    HX GYN      hysterectomy         Family History:   Problem Relation Age of Onset    Hypertension Father     Kidney Disease Maternal Grandmother     Hypertension Maternal Grandmother     Heart Attack Paternal Grandfather     Hypertension Mother     Cancer Maternal Grandfather      lung       Social History     Social History    Marital status:      Spouse name: N/A    Number of children: N/A    Years of education: N/A     Occupational History    Not on file.      Social History Main Topics    Smoking status: Never Smoker    Smokeless tobacco: Never Used    Alcohol use Yes      Comment: Occasionally    Drug use: No    Sexual activity: Yes     Partners: Male     Birth control/ protection: None Other Topics Concern    Not on file     Social History Narrative         ALLERGIES: Latex and Aspirin    Review of Systems   Constitutional: Negative for appetite change, chills and fever. HENT: Negative for rhinorrhea, sore throat and trouble swallowing. Eyes: Negative for photophobia. Respiratory: Negative for cough and shortness of breath. Cardiovascular: Negative for chest pain and palpitations. Gastrointestinal: Negative for abdominal pain, nausea and vomiting. Genitourinary: Negative for dysuria, frequency and hematuria. Musculoskeletal: Negative for arthralgias. Left foot pain   Neurological: Negative for dizziness, syncope and weakness. Psychiatric/Behavioral: Negative for behavioral problems. The patient is not nervous/anxious. All other systems reviewed and are negative. Vitals:    08/27/17 1219   BP: 124/78   Pulse: 74   Resp: 18   Temp: 97.5 °F (36.4 °C)   SpO2: 97%   Weight: 85.3 kg (188 lb)   Height: 5' 5\" (1.651 m)            Physical Exam   Constitutional: She is oriented to person, place, and time. She appears well-developed and well-nourished. HENT:   Head: Normocephalic and atraumatic. Mouth/Throat: Oropharynx is clear and moist.   Eyes: EOM are normal. Pupils are equal, round, and reactive to light. Neck: Normal range of motion. Neck supple. Cardiovascular: Normal rate, regular rhythm, normal heart sounds and intact distal pulses. Exam reveals no gallop and no friction rub. No murmur heard. Pulmonary/Chest: Effort normal. No respiratory distress. She has no wheezes. She has no rales. Abdominal: Soft. There is no tenderness. There is no rebound. Musculoskeletal: Normal range of motion. Soft tissue swelling and tenderness over the dorsum of the 2nd, 3rd, and 4th metatarsal of the left foot   Neurological: She is alert and oriented to person, place, and time. No cranial nerve deficit. Motor; symmetric   Skin: No erythema.    Psychiatric: She has a normal mood and affect. Her behavior is normal.   Nursing note and vitals reviewed.    Note written by Mundo Wood, as dictated by Fernando Llamas MD 1:25 PM      [facility]  ED Course       Procedures

## 2017-08-27 NOTE — DISCHARGE INSTRUCTIONS
We hope that we have addressed all of your medical concerns. The examination and treatment you received in the Emergency Department were for an emergent problem and were not intended as complete care. It is important that you follow up with your healthcare provider(s) for ongoing care. If your symptoms worsen or do not improve as expected, and you are unable to reach your usual health care provider(s), you should return to the Emergency Department. Today's healthcare is undergoing tremendous change, and patient satisfaction surveys are one of the many tools to assess the quality of medical care. You may receive a survey from the [employer] regarding your experience in the Emergency Department. I hope that your experience has been completely positive, particularly the medical care that I provided. As such, please participate in the survey; anything less than excellent does not meet my expectations or intentions. [facility] and [address] [place] [address] participate in nationally recognized quality of care measures. If your blood pressure is greater than 120/80, as reported below, we urge that you seek medical care to address the potential of high blood pressure, commonly known as hypertension. Hypertension can be hereditary or can be caused by certain medical conditions, pain, stress, or \"white coat syndrome. \"       Please make an appointment with your health care provider(s) for follow up of your Emergency Department visit. VITALS:   Patient Vitals for the past 8 hrs:   Temp Pulse Resp BP SpO2   08/27/17 1219 97.5 °F (36.4 °C) 74 18 124/78 97 %          Thank you for allowing us to provide you with medical care today. We realize that you have many choices for your emergency care needs. Please choose us in the future for any continued health care needs.       Eliana Bravo MD    [facility]   Office: [phone]            No results found for this or any previous visit (from the past 24 hour(s)). Xr Foot Lt Min 3 V    Result Date: 8/27/2017  INDICATION: left foot pain/swelling Exam: AP, lateral, oblique views of the left foot. FINDINGS: There is no acute fracture or dislocation. Visualized articulations are normal. Bones are well-mineralized. Soft tissues are normal. No radiodense foreign body is seen. IMPRESSION: No acute fracture or dislocation.

## 2017-08-27 NOTE — ED TRIAGE NOTES
Pt presents with complaints of left foot pain and swelling since Friday.  Pt states that she has started walking more recently to lose weight, pt denies any injury

## 2017-08-27 NOTE — ED NOTES
Pts left foot was wrapped in an ace wrap and pt was given ice pack. Pt given discharge instructions by provider. Questions answered and pt states understanding, no distress noted, pt wheeled out of unit.

## 2017-09-04 DIAGNOSIS — E55.9 VITAMIN D DEFICIENCY: ICD-10-CM

## 2017-09-21 RX ORDER — ERGOCALCIFEROL 1.25 MG/1
CAPSULE ORAL
Qty: 24 CAP | Refills: 0 | OUTPATIENT
Start: 2017-09-21

## 2017-09-22 LAB — HBA1C MFR BLD HPLC: 5.6 %

## 2017-09-22 NOTE — TELEPHONE ENCOUNTER
Please call patient and let her know I did not refill her vitamin D because she needs to have a level drawn. She should have a lab slip.

## 2017-09-27 ENCOUNTER — TELEPHONE (OUTPATIENT)
Dept: FAMILY MEDICINE CLINIC | Age: 47
End: 2017-09-27

## 2017-09-27 NOTE — TELEPHONE ENCOUNTER
Pt called requesting lab orders to get her vitamin D levels drawn be mailed to her home. Lab order reprinted and left at  to mail after confirming address on file with pt.  Letty

## 2017-10-05 ENCOUNTER — OFFICE VISIT (OUTPATIENT)
Dept: FAMILY MEDICINE CLINIC | Age: 47
End: 2017-10-05

## 2017-10-05 VITALS
HEIGHT: 65 IN | HEART RATE: 70 BPM | SYSTOLIC BLOOD PRESSURE: 119 MMHG | TEMPERATURE: 98.3 F | WEIGHT: 196 LBS | DIASTOLIC BLOOD PRESSURE: 83 MMHG | BODY MASS INDEX: 32.65 KG/M2 | RESPIRATION RATE: 20 BRPM

## 2017-10-05 DIAGNOSIS — F51.04 CHRONIC INSOMNIA: ICD-10-CM

## 2017-10-05 DIAGNOSIS — E66.9 NON MORBID OBESITY: Primary | ICD-10-CM

## 2017-10-05 RX ORDER — ERGOCALCIFEROL 1.25 MG/1
CAPSULE ORAL
Refills: 0 | COMMUNITY
Start: 2017-08-27 | End: 2017-10-05

## 2017-10-05 RX ORDER — BUTALBITAL, ACETAMINOPHEN AND CAFFEINE 300; 40; 50 MG/1; MG/1; MG/1
CAPSULE ORAL
COMMUNITY
Start: 2016-10-08 | End: 2017-10-05 | Stop reason: SDUPTHER

## 2017-10-05 RX ORDER — DIAZEPAM 10 MG/1
TABLET ORAL
Refills: 0 | COMMUNITY
Start: 2017-08-24 | End: 2017-12-10

## 2017-10-05 RX ORDER — TEMAZEPAM 30 MG/1
CAPSULE ORAL
Qty: 30 CAP | Refills: 5 | Status: SHIPPED | OUTPATIENT
Start: 2017-10-05 | End: 2018-05-15

## 2017-10-05 RX ORDER — NAPROXEN 500 MG/1
1 TABLET ORAL 2 TIMES DAILY
COMMUNITY
Start: 2017-04-29 | End: 2017-10-05

## 2017-10-05 NOTE — MR AVS SNAPSHOT
Visit Information     Date & Time Provider Department Dept. Phone Encounter #    10/5/2017  8:45 AM Agnes Benson MD [address] [phone] [id number]      Follow-up Instructions     Return in about 3 months (around 1/5/2018) for weight check. Upcoming Health Maintenance        Date Due    PAP AKA CERVICAL CYTOLOGY 10/9/1992    INFLUENZA AGE 9 TO ADULT 8/1/2017    DTaP/Tdap/Td series (2 - Td) 3/28/2027      Allergies as of 10/5/2017  Review Complete On: 10/5/2017 By: Agnes Benson MD       Severity Noted Reaction Type Reactions    Latex Medium 01/04/2013   Topical Rash    Aspirin  02/12/2017    Other (comments)    \"It doesn't digest in my system\"      Current Immunizations  Reviewed on 3/28/2017    No immunizations on file. Not reviewed this visit   You Were Diagnosed With        Codes Comments    Non morbid obesity    -  Primary ICD-10-CM: E66.9  ICD-9-CM: 278.00     Chronic insomnia     ICD-10-CM: F51.04  ICD-9-CM: 780.52       Vitals     BP Pulse Temp Resp Height(growth percentile) Weight(growth percentile)    119/83 (BP 1 Location: Left arm, BP Patient Position: Sitting) 70 98.3 °F (36.8 °C) (Oral) 20 5' 5\" (1.651 m) 196 lb (88.9 kg)    BMI OB Status Smoking Status             32.62 kg/m2 Hysterectomy Never Smoker         BMI and BSA Data     Body Mass Index Body Surface Area    32.62 kg/m 2 2.02 m 2         Preferred Pharmacy       Pharmacy Name Phone    [facility] [address] [facility] [phone]         Your Updated Medication List          This list is accurate as of: 10/5/17  9:49 AM.  Always use your most recent med list.                butalbital-acetaminophen-caff -40 mg per capsule   Commonly known as:  FIORICET   TAKE 1 TO 2 CAPSULE BY MOUTH EVERY 8 HOURS AS NEEDED FOR HEADACHES       diazePAM 10 mg tablet   Commonly known as:  VALIUM   TAKE 1 T PO 1 HOUR PRIOR TO PROCEDURE. DO NOT DRIVE WHILE TAKING THIS MEDICATION. methocarbamol 500 mg tablet   Commonly known as:  ROBAXIN   Take 1 Tab by mouth every six (6) hours as needed. pravastatin 20 mg tablet   Commonly known as:  PRAVACHOL   TAKE 1 TABLET BY MOUTH EVERY NIGHT       temazepam 30 mg capsule   Commonly known as:  RESTORIL   TAKE 1 CAPSULES BY MOUTH EVERY NIGHT AT BEDTIME AS NEEDED FOR SLEEP               Prescriptions Printed        Refills    temazepam (RESTORIL) 30 mg capsule 5    Sig: TAKE 1 CAPSULES BY MOUTH EVERY NIGHT AT BEDTIME AS NEEDED FOR SLEEP    Class: Print      We Performed the Following     REFERRAL TO NUTRITION [REF50 Custom]     Comments:    Non-morbid obesity. PLEASE CALL [phone] TO SCHEDULE AN APPOINTMENT WITH TASNEEM RUFFIN RD, FOR NUTRITION COUNSELING. Follow-up Instructions     Return in about 3 months (around 1/5/2018) for weight check. Referral Information     Referral ID Referred By Referred To       [id number] Kan Ewing Not Available         Visits Status Start Date End Date    1 New Request 10/5/17 10/5/18    If your referral has a status of pending review or denied, additional information will be sent to support the outcome of this decision. Introducing [facility] & HEALTH SERVICES! Dear Jany Vasquez: Thank you for requesting a [employer] account. Our records indicate that you already have an active [employer] account. You can access your account anytime at [email or web address]     Did you know that you can access your hospital and ER discharge instructions at any time in [employer]? You can also review all of your test results from your hospital stay or ER visit. Additional Information    If you have questions, please visit the Frequently Asked Questions section of the [employer] website at [email or web address] Remember, [employer] is NOT to be used for urgent needs. For medical emergencies, dial 911. Now available from your iPhone and Android! Please provide this summary of care documentation to your next provider. Your primary care clinician is listed as Ambrosio Eng. If you have any questions after today's visit, please call [phone].

## 2017-10-05 NOTE — PROGRESS NOTES
Chief Complaint   Patient presents with    Blood sugar problem     elevated HgA1C    Weight Gain     getting steroid injections in back for pain   \1. Have you been to the ER, urgent care clinic since your last visit? No.   Hospitalized since your last visit? No    2. Have you seen or consulted any other health care providers outside of the [address] [place] [address] since your last visit? Include any pap smears or colon screening.  No.

## 2017-10-05 NOTE — PROGRESS NOTES
HISTORY OF PRESENT ILLNESS  Clementina Fabry is a 40 y.o. female. Blood sugar problem   The history is provided by the patient (her A1C was 5.6 at her gyn visit). This is a new problem. Progression since onset: unknown. Associated symptoms include headaches. Pertinent negatives include no chest pain, no abdominal pain and no shortness of breath. Associated symptoms comments: Weight gain, which she attributes to the steroid injections she has been getting in her back. Once in May and once in September. She is having her usual migraines. . Exacerbated by: obesity, steroid injections. Nothing relieves the symptoms. She has tried nothing for the symptoms. Other   The history is provided by the patient (she is not exercising due to her back problems and a foot strain at the end of August.  Also, she needs a refill on her temezepam for her chronic insomnia. She takes if nightly. ). Associated symptoms include headaches. Pertinent negatives include no chest pain, no abdominal pain and no shortness of breath. Review of Systems   Constitutional: Negative for weight loss. Weight gain   Eyes: Negative for blurred vision. Respiratory: Negative for shortness of breath. Cardiovascular: Negative for chest pain and leg swelling. Gastrointestinal: Negative for abdominal pain. Genitourinary:        No polyuria   Neurological: Positive for headaches. Negative for dizziness, sensory change, speech change and focal weakness. Endo/Heme/Allergies: Negative for polydipsia. Visit Vitals    /83 (BP 1 Location: Left arm, BP Patient Position: Sitting)    Pulse 70    Temp 98.3 °F (36.8 °C) (Oral)    Resp 20    Ht 5' 5\" (1.651 m)    Wt 196 lb (88.9 kg)    BMI 32.62 kg/m2     Physical Exam   Constitutional: She is oriented to person, place, and time. She appears well-developed and well-nourished. No distress. Cardiovascular: Normal rate, regular rhythm and normal heart sounds.   Exam reveals no gallop and no friction rub. No murmur heard. Pulmonary/Chest: Effort normal and breath sounds normal. No respiratory distress. She has no wheezes. She has no rales. Musculoskeletal: She exhibits no edema. Neurological: She is alert and oriented to person, place, and time. Skin: Skin is warm and dry. She is not diaphoretic. Psychiatric: She has a normal mood and affect. Her behavior is normal. Judgment and thought content normal.   Nursing note and vitals reviewed. ASSESSMENT and PLAN    ICD-10-CM ICD-9-CM    1. Non morbid obesity E66.9 278.00 REFERRAL TO NUTRITION   2. Chronic insomnia F51.04 780.52 temazepam (RESTORIL) 30 mg capsule        Borderline A1C  Obesity worse from physical inactivity due to her back and foot problems  Swimming for exercise  Nutrition referral  I have reviewed/discussed the above normal BMI with the patient. I have recommended the following interventions: dietary management education, guidance, and counseling, encourage exercise and monitor weight . [facility] Restoril refilled, patient not found in the     Follow-up Disposition:  Return in about 3 months (around 1/5/2018) for weight check. Reviewed plan of care. Patient has provided input and agrees with goals.

## 2017-10-23 ENCOUNTER — HOSPITAL ENCOUNTER (OUTPATIENT)
Dept: PHYSICAL THERAPY | Age: 47
Discharge: HOME | End: 2017-10-23
Payer: SUBSIDIZED

## 2017-10-23 PROCEDURE — 97014 ELECTRIC STIMULATION THERAPY: CPT | Performed by: PHYSICAL THERAPIST

## 2017-10-23 PROCEDURE — 97110 THERAPEUTIC EXERCISES: CPT | Performed by: PHYSICAL THERAPIST

## 2017-10-23 PROCEDURE — 97162 PT EVAL MOD COMPLEX 30 MIN: CPT | Performed by: PHYSICAL THERAPIST
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
## 2017-10-25 LAB — 25(OH)D3+25(OH)D2 SERPL-MCNC: 41.3 NG/ML (ref 30–100)

## 2017-10-26 ENCOUNTER — HOSPITAL ENCOUNTER (OUTPATIENT)
Dept: PHYSICAL THERAPY | Age: 47
End: 2017-10-26
Payer: SUBSIDIZED

## 2017-10-30 ENCOUNTER — HOSPITAL ENCOUNTER (OUTPATIENT)
Dept: PHYSICAL THERAPY | Age: 47
Discharge: HOME | End: 2017-10-30
Payer: SUBSIDIZED

## 2017-10-30 PROCEDURE — 97014 ELECTRIC STIMULATION THERAPY: CPT

## 2017-10-30 PROCEDURE — 97110 THERAPEUTIC EXERCISES: CPT

## 2017-10-30 NOTE — PROGRESS NOTES
PT DAILY TREATMENT NOTE 2-15    Patient Name: Lester Peters  Date:10/30/2017  : 1970  [x]  Patient  Verified  Payor: Nory Marlow / Plan: [employer] HMO / Product Type: HMO /    In time:10:45a  Out time:11:50  Total Treatment Time (min): 72  Visit #: 2     Treatment Area: Chronic pain syndrome [G89.4]    SUBJECTIVE  Pain Level (0-10 scale): 7  Any medication changes, allergies to medications, adverse drug reactions, diagnosis change, or new procedure performed?: [x] No    [] Yes (see summary sheet for update)  Subjective functional status/changes:   [] No changes reported  Patient reports she felt good after last session for around 1-2 hours.     OBJECTIVE    Modality rationale: decrease pain and increase tissue extensibility to improve the patients ability to sit, stand, lift, reach, carry and complete ADL's   Min Type Additional Details   15 [x] Estim: []Att   [x]Unatt        []TENS instruct                  []IFC  [x]Premod   []NMES                     []Other:  []w/US   []w/ice   [x]w/heat  Position: supine  Location:lumbar    []  Traction: [] Cervical       []Lumbar                       [] Prone          []Supine                       []Intermittent   []Continuous Lbs:  [] before manual  [] after manual  []w/heat    []  Ultrasound: []Continuous   [] Pulsed at:                            []1MHz   []3MHz Location:  W/cm2:    []  Paraffin         Location:  []w/heat    []  Ice     []  Heat  []  Ice massage Position:  Location:    []  Laser  []  Other: Position:  Location:    []  Vasopneumatic Device Pressure:       [] lo [] med [] hi   Temperature:    [x] Skin assessment post-treatment:  [x]intact []redness- no adverse reaction    []redness - adverse reaction:     50 min Therapeutic Exercise:  [x] See flow sheet :   Rationale: increase ROM, increase strength, improve coordination, improve balance and increase proprioception to improve the patients ability to sit, stand, lift, reach, carry and complete ADL's      With   [] TE   [] TA   [] neuro   [] other: Patient Education: [x] Review HEP    [] Progressed/Changed HEP based on:   [] positioning   [] body mechanics   [] transfers   [] heat/ice application    [] other:      Other Objective/Functional Measures: pt with no increased pain with exercises. Pain Level (0-10 scale) post treatment: 5    ASSESSMENT/Changes in Function:     Patient will continue to benefit from skilled PT services to modify and progress therapeutic interventions, address functional mobility deficits, address ROM deficits, address strength deficits, analyze and address soft tissue restrictions, analyze and cue movement patterns, analyze and modify body mechanics/ergonomics and assess and modify postural abnormalities to attain remaining goals. []  See Plan of Care  []  See progress note/recertification  []  See Discharge Summary         Progress towards goals / Updated goals:  Patient able to advance several exercises with no increased pain and reports of some tightness through back. Patient requires verbal cues for postural awareness and mechanics to ensure proper exercises reproduction.      PLAN  [x]  Upgrade activities as tolerated     [x]  Continue plan of care  []  Update interventions per flow sheet       []  Discharge due to:_  []  Other:_      Jessica Carroll 10/30/2017  10:54 AM

## 2017-11-02 ENCOUNTER — HOSPITAL ENCOUNTER (OUTPATIENT)
Dept: PHYSICAL THERAPY | Age: 47
Discharge: HOME | End: 2017-11-02
Payer: COMMERCIAL

## 2017-11-02 PROCEDURE — 97110 THERAPEUTIC EXERCISES: CPT

## 2017-11-02 PROCEDURE — 97014 ELECTRIC STIMULATION THERAPY: CPT

## 2017-11-02 NOTE — PROGRESS NOTES
PT DAILY TREATMENT NOTE 2-15    Patient Name: Tamiko Zamora  Date:2017  : 1970  [x]  Patient  Verified  Payor: Tressa Raphael / Plan: [employer] HMO / Product Type: HMO /    In time:11:05a  Out time: 12:15p  Total Treatment Time (min): 79  Visit #: 3    Treatment Area: Chronic pain syndrome [G89.4]    SUBJECTIVE  Pain Level (0-10 scale): 6  Any medication changes, allergies to medications, adverse drug reactions, diagnosis change, or new procedure performed?: [x] No    [] Yes (see summary sheet for update)  Subjective functional status/changes:   [] No changes reported  Patient reports she has been doing her HEP every day and started walking with her back brace on. Patient states she was able to walk 2-3 miles without increased pain and felt tight and tired, but felt better after stretching.     OBJECTIVE    Modality rationale: decrease pain and increase tissue extensibility to improve the patients ability to sit, stand, lift, reach, carry and complete ADL's   Min Type Additional Details   15 [x] Estim: []Att   [x]Unatt        []TENS instruct                  []IFC  [x]Premod   []NMES                     []Other:  []w/US   []w/ice   [x]w/heat  Position: supine  Location:lumbar    []  Traction: [] Cervical       []Lumbar                       [] Prone          []Supine                       []Intermittent   []Continuous Lbs:  [] before manual  [] after manual  []w/heat    []  Ultrasound: []Continuous   [] Pulsed at:                            []1MHz   []3MHz Location:  W/cm2:    []  Paraffin         Location:  []w/heat    []  Ice     []  Heat  []  Ice massage Position:  Location:    []  Laser  []  Other: Position:  Location:    []  Vasopneumatic Device Pressure:       [] lo [] med [] hi   Temperature:    [x] Skin assessment post-treatment:  [x]intact []redness- no adverse reaction    []redness - adverse reaction:     55 min Therapeutic Exercise:  [x] See flow sheet :   Rationale: increase ROM, increase strength, improve coordination, improve balance and increase proprioception to improve the patients ability to sit, stand, lift, reach, carry and complete ADL's      With   [] TE   [] TA   [] neuro   [] other: Patient Education: [x] Review HEP    [] Progressed/Changed HEP based on:   [] positioning   [] body mechanics   [] transfers   [] heat/ice application    [] other:      Other Objective/Functional Measures: pt with no increased pain with exercises. Pain Level (0-10 scale) post treatment: 0     ASSESSMENT/Changes in Function:     Patient will continue to benefit from skilled PT services to modify and progress therapeutic interventions, address functional mobility deficits, address ROM deficits, address strength deficits, analyze and address soft tissue restrictions, analyze and cue movement patterns, analyze and modify body mechanics/ergonomics and assess and modify postural abnormalities to attain remaining goals. []  See Plan of Care  []  See progress note/recertification  []  See Discharge Summary         Progress towards goals / Updated goals:   Patient able to advance several exercises with no increased pain and reports of some tightness through back. Patient requires verbal cues for postural awareness and mechanics to ensure proper exercises reproduction.      PLAN  [x]  Upgrade activities as tolerated     [x]  Continue plan of care  []  Update interventions per flow sheet       []  Discharge due to:_  []  Other:Chhaya Rubin 11/2/2017  10:54 AM

## 2017-11-06 ENCOUNTER — HOSPITAL ENCOUNTER (OUTPATIENT)
Dept: PHYSICAL THERAPY | Age: 47
Discharge: HOME | End: 2017-11-06
Payer: COMMERCIAL

## 2017-11-06 PROCEDURE — 97110 THERAPEUTIC EXERCISES: CPT

## 2017-11-06 PROCEDURE — 97014 ELECTRIC STIMULATION THERAPY: CPT

## 2017-11-06 NOTE — PROGRESS NOTES
PT DAILY TREATMENT NOTE 2-15    Patient Name: Kathryn Crespo  Date:2017  : 1970  [x]  Patient  Verified  Payor: Felipe York / Plan: [address] HMO / Product Type: HMO /    In time:11:05a  Out time: 12:20  Total Treatment Time (min): 75  Visit #: 4    Treatment Area: Chronic pain syndrome [G89.4]    SUBJECTIVE  Pain Level (0-10 scale): 5  Any medication changes, allergies to medications, adverse drug reactions, diagnosis change, or new procedure performed?: [x] No    [] Yes (see summary sheet for update)  Subjective functional status/changes:   [] No changes reported  Patient reports she still gets sharp pain down left neck and just behind the shoulder blade and into the back.     OBJECTIVE    Modality rationale: decrease pain and increase tissue extensibility to improve the patients ability to sit, stand, lift, reach, carry and complete ADL's   Min Type Additional Details   15 [x] Estim: []Att   [x]Unatt        []TENS instruct                  []IFC  [x]Premod   []NMES                     []Other:  []w/US   []w/ice   [x]w/heat  Position: supine  Location:lumbar    []  Traction: [] Cervical       []Lumbar                       [] Prone          []Supine                       []Intermittent   []Continuous Lbs:  [] before manual  [] after manual  []w/heat    []  Ultrasound: []Continuous   [] Pulsed at:                            []1MHz   []3MHz Location:  W/cm2:    []  Paraffin         Location:  []w/heat    []  Ice     []  Heat  []  Ice massage Position:  Location:    []  Laser  []  Other: Position:  Location:    []  Vasopneumatic Device Pressure:       [] lo [] med [] hi   Temperature:    [x] Skin assessment post-treatment:  [x]intact []redness- no adverse reaction    []redness - adverse reaction:     60 min Therapeutic Exercise:  [x] See flow sheet :   Rationale: increase ROM, increase strength, improve coordination, improve balance and increase proprioception to improve the patients ability to sit, stand, lift, reach, carry and complete ADL's      With   [] TE   [] TA   [] neuro   [] other: Patient Education: [x] Review HEP    [] Progressed/Changed HEP based on:   [] positioning   [] body mechanics   [] transfers   [] heat/ice application    [] other:      Other Objective/Functional Measures: pain with no moneys, bridges     Pain Level (0-10 scale) post treatment: 0     ASSESSMENT/Changes in Function:     Patient will continue to benefit from skilled PT services to modify and progress therapeutic interventions, address functional mobility deficits, address ROM deficits, address strength deficits, analyze and address soft tissue restrictions, analyze and cue movement patterns, analyze and modify body mechanics/ergonomics and assess and modify postural abnormalities to attain remaining goals. []  See Plan of Care  []  See progress note/recertification  []  See Discharge Summary         Progress towards goals / Updated goals:   Patient demonstrates good tolerance for intervention with increased fatigue by end of session. Patient requires frequent ques for proper exercises production. Patient with poor carryover between session and increased tissue turgor left thoracic back.       PLAN  [x]  Upgrade activities as tolerated     [x]  Continue plan of care  []  Update interventions per flow sheet       []  Discharge due to:_  []  Other:_      Ingris Rubin 11/6/2017  10:54 AM

## 2017-11-09 ENCOUNTER — HOSPITAL ENCOUNTER (OUTPATIENT)
Dept: PHYSICAL THERAPY | Age: 47
Discharge: HOME | End: 2017-11-09
Payer: COMMERCIAL

## 2017-11-09 PROCEDURE — 97014 ELECTRIC STIMULATION THERAPY: CPT | Performed by: PHYSICAL THERAPIST

## 2017-11-09 PROCEDURE — 97110 THERAPEUTIC EXERCISES: CPT | Performed by: PHYSICAL THERAPIST

## 2017-11-09 NOTE — PROGRESS NOTES
PT DAILY TREATMENT NOTE 2-15    Patient Name: Peyton Templeton  Date:2017  : 1970  [x]  Patient  Verified  Payor: Dexter Cunningham / Plan: Gregor Ahuja Se HMO / Product Type: HMO /    In time:10:20a  Out time: 11:35a  Total Treatment Time (min): 76  Visit #: 5    Treatment Area: Chronic pain syndrome [G89.4]    SUBJECTIVE  Pain Level (0-10 scale): 7/10  Any medication changes, allergies to medications, adverse drug reactions, diagnosis change, or new procedure performed?: [x] No    [] Yes (see summary sheet for update)  Subjective functional status/changes:   [] No changes reported  Patient reports she has pain going down her back  Patient reports she woke up with pain and didn't sleep well last night  Patient reports the relief she gets from PT only lasts about an hour    OBJECTIVE    Modality rationale: decrease pain and increase tissue extensibility to improve the patients ability to sit, stand, lift, reach, carry and complete ADL's   Min Type Additional Details   15 [x] Estim: []Att   [x]Unatt        []TENS instruct                  []IFC  [x]Premod   []NMES                     []Other:  []w/US   []w/ice   [x]w/heat  Position: supine  Location:lumbar    []  Traction: [] Cervical       []Lumbar                       [] Prone          []Supine                       []Intermittent   []Continuous Lbs:  [] before manual  [] after manual  []w/heat    []  Ultrasound: []Continuous   [] Pulsed at:                            []1MHz   []3MHz Location:  W/cm2:    []  Paraffin         Location:  []w/heat    []  Ice     []  Heat  []  Ice massage Position:  Location:    []  Laser  []  Other: Position:  Location:    []  Vasopneumatic Device Pressure:       [] lo [] med [] hi   Temperature:    [x] Skin assessment post-treatment:  [x]intact []redness- no adverse reaction    []redness - adverse reaction:     60 min Therapeutic Exercise:  [x] See flow sheet :   Rationale: increase ROM, increase strength, improve coordination, improve balance and increase proprioception to improve the patients ability to sit, stand, lift, reach, carry and complete ADL's      With   [x] TE   [] TA   [] neuro   [] other: Patient Education: [x] Review HEP    [] Progressed/Changed HEP based on:   [] positioning   [] body mechanics   [] transfers   [x] heat/ice application    [] other:      Other Objective/Functional Measures:   L sided neck pain with R levator stretch     Pain Level (0-10 scale) post treatment: 6    ASSESSMENT/Changes in Function:     Patient will continue to benefit from skilled PT services to modify and progress therapeutic interventions, address functional mobility deficits, address ROM deficits, address strength deficits, analyze and address soft tissue restrictions, analyze and cue movement patterns, analyze and modify body mechanics/ergonomics and assess and modify postural abnormalities to attain remaining goals. []  See Plan of Care  []  See progress note/recertification  []  See Discharge Summary         Progress towards goals / Updated goals:   Exercises not advanced due to increased pain this visit.     PLAN  [x]  Upgrade activities as tolerated     [x]  Continue plan of care  []  Update interventions per flow sheet       []  Discharge due to:_  []  Other:_      Parish Bai, PT 11/9/2017  10:54 AM

## 2017-11-13 ENCOUNTER — HOSPITAL ENCOUNTER (OUTPATIENT)
Dept: PHYSICAL THERAPY | Age: 47
Discharge: HOME | End: 2017-11-13
Payer: COMMERCIAL

## 2017-11-13 PROCEDURE — 97014 ELECTRIC STIMULATION THERAPY: CPT | Performed by: PHYSICAL THERAPIST

## 2017-11-13 PROCEDURE — 97110 THERAPEUTIC EXERCISES: CPT | Performed by: PHYSICAL THERAPIST

## 2017-11-13 NOTE — PROGRESS NOTES
PT DAILY TREATMENT NOTE 2-15    Patient Name: Jay Saavedra  Date:2017  : 1970  [x]  Patient  Verified  Payor: Denton Allen / Plan: Gregor Ahuja Se HMO / Product Type: HMO /    In time:12:15PM  Out time: 1:30 PM  Total Treatment Time (min): 75  Visit #: 6    Treatment Area: Chronic pain syndrome [G89.4]    SUBJECTIVE  Pain Level (0-10 scale): 8/10  Any medication changes, allergies to medications, adverse drug reactions, diagnosis change, or new procedure performed?: [x] No    [] Yes (see summary sheet for update)  Subjective functional status/changes:   [] No changes reported  Patient reports she was able to walk a little bit this weekend but had to spend some time in the hospital with her uncle    OBJECTIVE    Modality rationale: decrease pain and increase tissue extensibility to improve the patients ability to sit, stand, lift, reach, carry and complete ADL's   Min Type Additional Details   15 [x] Estim: []Att   [x]Unatt        []TENS instruct                  []IFC  [x]Premod   []NMES                     []Other:  []w/US   []w/ice   [x]w/heat  Position: supine  Location:lumbar    []  Traction: [] Cervical       []Lumbar                       [] Prone          []Supine                       []Intermittent   []Continuous Lbs:  [] before manual  [] after manual  []w/heat    []  Ultrasound: []Continuous   [] Pulsed at:                            []1MHz   []3MHz Location:  W/cm2:    []  Paraffin         Location:  []w/heat    []  Ice     []  Heat  []  Ice massage Position:  Location:    []  Laser  []  Other: Position:  Location:    []  Vasopneumatic Device Pressure:       [] lo [] med [] hi   Temperature:    [x] Skin assessment post-treatment:  [x]intact []redness- no adverse reaction    []redness - adverse reaction:     60 min Therapeutic Exercise:  [x] See flow sheet :   Rationale: increase ROM, increase strength, improve coordination, improve balance and increase proprioception to improve the patients ability to sit, stand, lift, reach, carry and complete ADL's    With   [x] TE   [] TA   [] neuro   [] other: Patient Education: [x] Review HEP    [] Progressed/Changed HEP based on:   [] positioning   [] body mechanics   [] transfers   [x] heat/ice application    [] other:      Other Objective/Functional Measures:   Pt instructed in how to use the TENs unit    Pain Level (0-10 scale) post treatment: 6    ASSESSMENT/Changes in Function:     Patient will continue to benefit from skilled PT services to modify and progress therapeutic interventions, address functional mobility deficits, address ROM deficits, address strength deficits, analyze and address soft tissue restrictions, analyze and cue movement patterns, analyze and modify body mechanics/ergonomics and assess and modify postural abnormalities to attain remaining goals. []  See Plan of Care  []  See progress note/recertification  []  See Discharge Summary         Progress towards goals / Updated goals:   Patient continues to require verbal cues to complete exercises with correct form and postural awareness. Patient was able to advance several exercises this visit and is progressing well towards goals.     PLAN  [x]  Upgrade activities as tolerated     [x]  Continue plan of care  []  Update interventions per flow sheet       []  Discharge due to:_  []  Other:_      Luisa Mcdermott, PT 11/13/2017  12:15 PM

## 2017-11-20 ENCOUNTER — HOSPITAL ENCOUNTER (OUTPATIENT)
Dept: PHYSICAL THERAPY | Age: 47
Discharge: HOME | End: 2017-11-20
Payer: COMMERCIAL

## 2017-11-20 ENCOUNTER — HOSPITAL ENCOUNTER (EMERGENCY)
Age: 47
Discharge: HOME | End: 2017-11-20
Attending: FAMILY MEDICINE

## 2017-11-20 VITALS
BODY MASS INDEX: 33.32 KG/M2 | TEMPERATURE: 98.9 F | HEART RATE: 89 BPM | OXYGEN SATURATION: 98 % | HEIGHT: 65 IN | SYSTOLIC BLOOD PRESSURE: 136 MMHG | RESPIRATION RATE: 18 BRPM | DIASTOLIC BLOOD PRESSURE: 72 MMHG | WEIGHT: 200 LBS

## 2017-11-20 DIAGNOSIS — L28.2 PAPULAR URTICARIA: Primary | ICD-10-CM

## 2017-11-20 PROCEDURE — 97014 ELECTRIC STIMULATION THERAPY: CPT

## 2017-11-20 PROCEDURE — 97110 THERAPEUTIC EXERCISES: CPT

## 2017-11-20 RX ORDER — MINERAL OIL
180 ENEMA (ML) RECTAL DAILY
Qty: 30 TAB | Refills: 0 | Status: SHIPPED | OUTPATIENT
Start: 2017-11-20 | End: 2017-12-20

## 2017-11-20 RX ORDER — PREDNISONE 20 MG/1
TABLET ORAL
Qty: 6 TAB | Refills: 0 | Status: SHIPPED | OUTPATIENT
Start: 2017-11-20 | End: 2017-11-27

## 2017-11-20 NOTE — DISCHARGE INSTRUCTIONS
Insect Stings and Bites: Care Instructions  Your Care Instructions  Stings and bites from bees, wasps, ants, and other insects often cause pain, swelling, redness, and itching. In some people, especially children, the redness and swelling may be worse. It may extend several inches beyond the affected area. But in most cases, stings and bites don't cause reactions all over the body. If you have had a reaction to an insect sting or bite, you are at risk for a reaction if you get stung or bitten again. Follow-up care is a key part of your treatment and safety. Be sure to make and go to all appointments, and call your doctor if you are having problems. It's also a good idea to know your test results and keep a list of the medicines you take. How can you care for yourself at home? · Do not scratch or rub the skin where the sting or bite occurred. · Put a cold pack or ice cube on the area. Put a thin cloth between the ice and your skin. For some people, a paste of baking soda mixed with a little water helps relieve pain and decrease the reaction. · Take an over-the-counter antihistamine, such as diphenhydramine (Benadryl) or loratadine (Claritin), to relieve swelling, redness, and itching. Calamine lotion or hydrocortisone cream may also help. Do not give antihistamines to your child unless you have checked with the doctor first.  · Be safe with medicines. If your doctor prescribed medicine for your allergy, take it exactly as prescribed. Call your doctor if you think you are having a problem with your medicine. You will get more details on the specific medicines your doctor prescribes. · Your doctor may prescribe a shot of epinephrine to carry with you in case you have a severe reaction. Learn how and when to give yourself the shot, and keep it with you at all times. Make sure it has not . · Go to the emergency room anytime you have a severe reaction.  Go even if you have given yourself epinephrine and are feeling better. Symptoms can come back. When should you call for help? Call 911 anytime you think you may need emergency care. For example, call if:  ? · You have symptoms of a severe allergic reaction. These may include:  ¨ Sudden raised, red areas (hives) all over your body. ¨ Swelling of the throat, mouth, lips, or tongue. ¨ Trouble breathing. ¨ Passing out (losing consciousness). Or you may feel very lightheaded or suddenly feel weak, confused, or restless. ?Call your doctor now or seek immediate medical care if:  ? · You have symptoms of an allergic reaction not right at the sting or bite, such as:  ¨ A rash or small area of hives (raised, red areas on the skin). ¨ Itching. ¨ Swelling. ¨ Belly pain, nausea, or vomiting. ? · You have a lot of swelling around the site (such as your entire arm or leg is swollen). ? · You have signs of infection, such as:  ¨ Increased pain, swelling, redness, or warmth around the sting. ¨ Red streaks leading from the area. ¨ Pus draining from the sting. ¨ A fever. ? Watch closely for changes in your health, and be sure to contact your doctor if:  ? · You do not get better as expected. Where can you learn more? Go to http://mario-barry.info/. Enter P390 in the search box to learn more about \"Insect Stings and Bites: Care Instructions. \"  Current as of: March 20, 2017  Content Version: 11.4  © 5609-5138 [employer]. Care instructions adapted under license by [employer] (which disclaims liability or warranty for this information). If you have questions about a medical condition or this instruction, always ask your healthcare professional. [place] [address] any warranty or liability for your use of this information.

## 2017-11-20 NOTE — PROGRESS NOTES
PT DAILY TREATMENT NOTE 2-15    Patient Name: Kiley Madison  Date:2017  : 1970  [x]  Patient  Verified  Payor: Dmitriy Glover / Plan: Gregor Ahuja Se HMO / Product Type: HMO /    In time:8:10a  Out time: 9:25a  Total Treatment Time (min): 75  Visit #: 6    Treatment Area: Chronic pain syndrome [G89.4]    SUBJECTIVE  Pain Level (0-10 scale): 6/10  Any medication changes, allergies to medications, adverse drug reactions, diagnosis change, or new procedure performed?: [x] No    [] Yes (see summary sheet for update)  Subjective functional status/changes:   [] No changes reported  Patient reports Saturday night she has a migraine and increased neck pain on the left. Patient states today she is feeling better with her neck, but her back is continuing to bother her. Patient states she continues to be stiff and sore when she tries to get up from bed or sitting.     OBJECTIVE    Modality rationale: decrease pain and increase tissue extensibility to improve the patients ability to sit, stand, lift, reach, carry and complete ADL's   Min Type Additional Details   15 [x] Estim: []Att   [x]Unatt        []TENS instruct                  []IFC  [x]Premod   []NMES                     []Other:  []w/US   []w/ice   [x]w/heat  Position: supine  Location:lumbar    []  Traction: [] Cervical       []Lumbar                       [] Prone          []Supine                       []Intermittent   []Continuous Lbs:  [] before manual  [] after manual  []w/heat    []  Ultrasound: []Continuous   [] Pulsed at:                            []1MHz   []3MHz Location:  W/cm2:    []  Paraffin         Location:  []w/heat    []  Ice     []  Heat  []  Ice massage Position:  Location:    []  Laser  []  Other: Position:  Location:    []  Vasopneumatic Device Pressure:       [] lo [] med [] hi   Temperature:    [x] Skin assessment post-treatment:  [x]intact []redness- no adverse reaction    []redness - adverse reaction:     60 min Therapeutic Exercise:  [x] See flow sheet :   Rationale: increase ROM, increase strength, improve coordination, improve balance and increase proprioception to improve the patients ability to sit, stand, lift, reach, carry and complete ADL's    With   [x] TE   [] TA   [] neuro   [] other: Patient Education: [x] Review HEP    [] Progressed/Changed HEP based on:   [] positioning   [] body mechanics   [] transfers   [x] heat/ice application    [] other:      Other Objective/Functional Measures:   No increased pain with interventions    Pain Level (0-10 scale) post treatment: 4    ASSESSMENT/Changes in Function:     Patient will continue to benefit from skilled PT services to modify and progress therapeutic interventions, address functional mobility deficits, address ROM deficits, address strength deficits, analyze and address soft tissue restrictions, analyze and cue movement patterns, analyze and modify body mechanics/ergonomics and assess and modify postural abnormalities to attain remaining goals. []  See Plan of Care  []  See progress note/recertification  []  See Discharge Summary         Progress towards goals / Updated goals:   Patient continues to require verbal cues to complete exercises with correct form and postural awareness. Patient was able to advance several exercises this visit and is progressing well towards goals.     PLAN  [x]  Upgrade activities as tolerated     [x]  Continue plan of care  []  Update interventions per flow sheet       []  Discharge due to:_  []  Other:_      Beacher Jump 11/20/2017  12:15 PM

## 2017-11-20 NOTE — UC PROVIDER NOTE
HPI Comments:   Rash onset 2 days ago. \"looks like maybe a bite or reaction\". Very itchy non painful. Some surrounding redness and are raised. Location: left upper chest. Right thigh. Tried rubbing alcohol on top without relief. No fever or chills. No discharge. Has stayed in 2 different hotels recently. Patient is a 56 y.o. female presenting with allergic reaction. Allergic Reaction           Past Medical History:   Diagnosis Date    Allergic rhinitis 6/14/2013    Asthma     Chronic insomnia 3/28/2017    GERD (gastroesophageal reflux disease)     Headache     Headache(784.0)     Hypercholesteremia 1/23/2012    Non morbid obesity 3/28/2017    Snoring     Vitamin D deficiency 10/20/2015        Past Surgical History:   Procedure Laterality Date    ENDOSCOPY, COLON, DIAGNOSTIC  12/03 & 04/09    HX GYN      Laproscopy, BTL,TVH    HX GYN      hysterectomy         Family History   Problem Relation Age of Onset    Hypertension Father     Kidney Disease Maternal Grandmother     Hypertension Maternal Grandmother     Heart Attack Paternal Grandfather     Hypertension Mother     Cancer Maternal Grandfather      lung        Social History     Social History    Marital status:      Spouse name: N/A    Number of children: N/A    Years of education: N/A     Occupational History    Not on file. Social History Main Topics    Smoking status: Never Smoker    Smokeless tobacco: Never Used    Alcohol use Yes      Comment: Occasionally    Drug use: No    Sexual activity: Yes     Partners: Male     Birth control/ protection: None     Other Topics Concern    Not on file     Social History Narrative                ALLERGIES: Latex and Aspirin    Review of Systems   Constitutional: Negative for chills, fatigue and fever. All other systems reviewed and are negative.       Vitals:    11/20/17 1455   BP: 136/72   Pulse: 89   Resp: 18   Temp: 98.9 °F (37.2 °C)   SpO2: 98%   Weight: 90.7 kg (200 lb)   Height: 5' 5\" (1.651 m)       Physical Exam   Constitutional: She is oriented to person, place, and time. No distress. Appears well   Eyes: EOM are normal. Pupils are equal, round, and reactive to light. Neck: Normal range of motion. Neck supple. Cardiovascular: Normal rate and normal heart sounds. Exam reveals no gallop and no friction rub. No murmur heard. Pulmonary/Chest: Effort normal and breath sounds normal. No respiratory distress. She has no wheezes. She has no rales. Lymphadenopathy:     She has no cervical adenopathy. Neurological: She is alert and oriented to person, place, and time. Skin: Skin is warm and dry. She is not diaphoretic. Erythematous quarter sized papules. No discharge or ulceration. No pain with palpation. Psychiatric: She has a normal mood and affect. Her behavior is normal. Thought content normal.       MDM     Differential Diagnosis; Clinical Impression; Plan:       CLINICAL IMPRESSION:  (L28.2) Papular urticaria  (primary encounter diagnosis)    Orders Placed This Encounter      predniSONE (DELTASONE) 20 mg tablet      fexofenadine (ALLEGRA ALLERGY) 180 mg tablet    Plan:  1. If continued spots emerge. Should inspect bedding for bed bugs  2. See above orders. No secondary infection at this time. 3. Follow up for new or worsening symptoms.   4. Discontinue rubbing alcohol        Procedures

## 2017-11-26 RX ORDER — DICLOFENAC POTASSIUM 50 MG/1
POWDER, FOR SOLUTION ORAL
Qty: 9 PACKET | Refills: 3 | Status: SHIPPED | OUTPATIENT
Start: 2017-11-26 | End: 2018-06-11

## 2017-11-27 ENCOUNTER — HOSPITAL ENCOUNTER (OUTPATIENT)
Dept: PHYSICAL THERAPY | Age: 47
Discharge: HOME | End: 2017-11-27
Payer: COMMERCIAL

## 2017-11-27 ENCOUNTER — HOSPITAL ENCOUNTER (EMERGENCY)
Age: 47
Discharge: HOME | End: 2017-11-27
Attending: FAMILY MEDICINE

## 2017-11-27 VITALS
OXYGEN SATURATION: 98 % | DIASTOLIC BLOOD PRESSURE: 79 MMHG | SYSTOLIC BLOOD PRESSURE: 133 MMHG | BODY MASS INDEX: 33.62 KG/M2 | TEMPERATURE: 98.1 F | HEART RATE: 87 BPM | RESPIRATION RATE: 22 BRPM | HEIGHT: 65 IN | WEIGHT: 201.8 LBS

## 2017-11-27 DIAGNOSIS — R21 RASH: Primary | ICD-10-CM

## 2017-11-27 DIAGNOSIS — L03.116 CELLULITIS OF LEFT LOWER EXTREMITY: ICD-10-CM

## 2017-11-27 PROCEDURE — 97014 ELECTRIC STIMULATION THERAPY: CPT | Performed by: PHYSICAL THERAPIST

## 2017-11-27 PROCEDURE — 97110 THERAPEUTIC EXERCISES: CPT | Performed by: PHYSICAL THERAPIST

## 2017-11-27 RX ORDER — PREDNISONE 20 MG/1
20 TABLET ORAL DAILY
Qty: 10 TAB | Refills: 0 | Status: SHIPPED | OUTPATIENT
Start: 2017-11-27 | End: 2017-12-07

## 2017-11-27 RX ORDER — SULFAMETHOXAZOLE AND TRIMETHOPRIM 800; 160 MG/1; MG/1
1 TABLET ORAL 2 TIMES DAILY
Qty: 20 TAB | Refills: 0 | Status: SHIPPED | OUTPATIENT
Start: 2017-11-27 | End: 2017-12-07

## 2017-11-27 NOTE — UC PROVIDER NOTE
Patient is a 56 y.o. female presenting with rash. The history is provided by the patient. Rash    This is a recurrent problem. Episode onset: 5 days ago. The problem has not changed since onset. The problem is associated with an unknown factor. The rash is present on the back and left lower leg. The patient is experiencing no pain. Associated symptoms include itching. She has tried oral steroids for the symptoms. The treatment provided mild relief. Past Medical History:   Diagnosis Date    Allergic rhinitis 6/14/2013    Asthma     Chronic insomnia 3/28/2017    GERD (gastroesophageal reflux disease)     Headache     Headache(784.0)     Hypercholesteremia 1/23/2012    Non morbid obesity 3/28/2017    Snoring     Vitamin D deficiency 10/20/2015        Past Surgical History:   Procedure Laterality Date    ENDOSCOPY, COLON, DIAGNOSTIC  12/03 & 04/09    HX GYN      Laproscopy, BTL,TVH    HX GYN      hysterectomy         Family History   Problem Relation Age of Onset    Hypertension Father     Kidney Disease Maternal Grandmother     Hypertension Maternal Grandmother     Heart Attack Paternal Grandfather     Hypertension Mother     Cancer Maternal Grandfather      lung        Social History     Social History    Marital status:      Spouse name: N/A    Number of children: N/A    Years of education: N/A     Occupational History    Not on file. Social History Main Topics    Smoking status: Never Smoker    Smokeless tobacco: Never Used    Alcohol use Yes      Comment: Occasionally    Drug use: No    Sexual activity: Yes     Partners: Male     Birth control/ protection: None     Other Topics Concern    Not on file     Social History Narrative                ALLERGIES: Latex and Aspirin    Review of Systems   Constitutional: Negative for chills. HENT: Negative for congestion. Skin: Positive for color change, itching and rash.        Vitals:    11/27/17 1713   BP: 133/79 Pulse: 87   Resp: 22   Temp: 98.1 °F (36.7 °C)   SpO2: 98%   Weight: 91.5 kg (201 lb 12.8 oz)   Height: 5' 5\" (1.651 m)       Physical Exam   Constitutional: She is oriented to person, place, and time. She appears well-developed and well-nourished. Eyes: Conjunctivae and EOM are normal.   Pulmonary/Chest: Effort normal.   Neurological: She is alert and oriented to person, place, and time. Skin: Skin is warm and dry. Rash noted. There is erythema. Left ankle erythematous, warm and slightly swollen. Three discrete papular lesions on upper back   Psychiatric: She has a normal mood and affect. Her behavior is normal. Judgment and thought content normal.   Nursing note and vitals reviewed. MDM     Differential Diagnosis; Clinical Impression; Plan:     CLINICAL IMPRESSION:  Rash  (primary encounter diagnosis)  Cellulitis of left lower extremity    Plan:  1. prednisone  2. Bactrim   3. Risk of Significant Complications, Morbidity, and/or Mortality:   Presenting problems: Moderate  Diagnostic procedures: Moderate  Management options:   Moderate  Progress:   Patient progress:  Stable      Procedures

## 2017-11-27 NOTE — DISCHARGE INSTRUCTIONS
Rash: Care Instructions  Your Care Instructions  A rash is any irritation or inflammation of the skin. Rashes have many possible causes, including allergy, infection, illness, heat, and emotional stress. Follow-up care is a key part of your treatment and safety. Be sure to make and go to all appointments, and call your doctor if you are having problems. It's also a good idea to know your test results and keep a list of the medicines you take. How can you care for yourself at home? · Wash the area with water only. Soap can make dryness and itching worse. Pat dry. · Put cold, wet cloths on the rash to reduce itching. · Keep cool, and stay out of the sun. · Leave the rash open to the air as much of the time as possible. · Sometimes petroleum jelly (Vaseline) can help relieve the discomfort caused by a rash. A moisturizing lotion, such as Cetaphil, also may help. Calamine lotion may help for rashes caused by contact with something (such as a plant or soap) that irritated the skin. Use it 3 or 4 times a day. · If your doctor prescribed a cream, use it as directed. If your doctor prescribed medicine, take it exactly as directed. · If your rash itches so badly that it interferes with your normal activities, take an over-the-counter antihistamine, such as diphenhydramine (Benadryl) or loratadine (Claritin). Read and follow all instructions on the label. When should you call for help? Call your doctor now or seek immediate medical care if:  ? · You have signs of infection, such as:  ¨ Increased pain, swelling, warmth, or redness. ¨ Red streaks leading from the area. ¨ Pus draining from the area. ¨ A fever. ? · You have joint pain along with the rash. ? Watch closely for changes in your health, and be sure to contact your doctor if:  ? · Your rash is changing or getting worse. For example, call if you have pain along with the rash, the rash is spreading, or you have new blisters.    ? · You do not get better after 1 week. Where can you learn more? Go to http://mario-barry.info/. Enter G926 in the search box to learn more about \"Rash: Care Instructions. \"  Current as of: October 13, 2016  Content Version: 11.4  © 1234-4451 [employer]. Care instructions adapted under license by [employer] (which disclaims liability or warranty for this information). If you have questions about a medical condition or this instruction, always ask your healthcare professional. [address] any warranty or liability for your use of this information.

## 2017-11-27 NOTE — PROGRESS NOTES
[facility]  [place]  [facility], [address] [place] [address] Drive  Phone: [phone]      Fax:  [phone]    Progress Note    Name: Shreya Herrera   : 1970   MD: Basim Kulkarni, NP       Treatment Diagnosis: Chronic pain syndrome [G89.4]  Start of Care: 10/23/17    Visits from Start of Care: 8  Missed Visits: 0    Summary of Care: Therapeutic Exercise,  Manual Therapy, Neuromuscular Re-education, Electrical Stimulation, Patient Education, Home Exercise Program     Assessment / Recommendations: The patient has completed 8 visits and has made significant gains in shoulder AROM and demonstrates improved activity tolerance. The patient continues to be limited by pain and weakness, affecting her ability to perform household chores. The patient has met all short term goals and would benefit from continued PT to reach long term goals. Short Term Goals: To be accomplished in 4 weeks:  1) The patient will be independent with introductory HEP  Status at last note/certification: not met  Status at progress note: met  2) The patient will improve lumbar flexion AROM to 2 inches from floor without an increase in pain to improve ease with dressing  Status at last note/certification: not met  Status at progress note: not met  3) The patient will improve L shoulder flexion AROM to 140 to improve ease with reaching tasks  Status at last note/certification: not met  Status at progress note: met  Long Term Goals:  To be accomplished in 12 weeks:  1) The patient will demonstrate 5/5 BUE strength to improve ease with lifting tasks  Status at last note/certification: not met  Status at progress note: not met  2) The patient will return to work without an increase in pain  Status at last note/certification: not met  Status at progress note: not met  3) The patient will resume fitness routine without an increase in pain  Status at last note/certification: not met  Status at progress note: not met      Parish Bai, PT 11/27/2017 2:06 PM    ________________________________________________________________________  NOTE TO PHYSICIAN:  Please complete the following and fax to: [facility]: [phone]  . Retain this original for your records. If you are unable to process this request in 24 hours, please contact our office.        ____ I have read the above report and request that my patient continue therapy with the following changes/special instructions:  ____ I have read the above report and request that my patient be discharged from therapy    Physician's Signature:_________________ Date:___________Time:__________

## 2017-11-27 NOTE — PROGRESS NOTES
PT DAILY TREATMENT NOTE 2-15    Patient Name: Kiley Madison  Date:2017  : 1970  [x]  Patient  Verified  Payor: Dmitriy Glover / Plan: Gregor Ahuja Se HMO / Product Type: HMO /    In time: 1:25 PM  Out time: 2:35 PM  Total Treatment Time (min): 70  Visit #: 8    Treatment Area: Chronic pain syndrome [G89.4]    SUBJECTIVE  Pain Level (0-10 scale): 8/10  Any medication changes, allergies to medications, adverse drug reactions, diagnosis change, or new procedure performed?: [x] No    [] Yes (see summary sheet for update)  Subjective functional status/changes:   [] No changes reported  Patient reports she got back yesterday morning from driving 04.5 hours to [facility], she had a lot of pain from the drive home and spent most of yesterday in her bed    OBJECTIVE    Modality rationale: decrease pain and increase tissue extensibility to improve the patients ability to sit, stand, lift, reach, carry and complete ADL's   Min Type Additional Details   15 [x] Estim: []Att   [x]Unatt        []TENS instruct                  []IFC  [x]Premod   []NMES                     []Other:  []w/US   []w/ice   [x]w/heat  Position: supine  Location:lumbar    []  Traction: [] Cervical       []Lumbar                       [] Prone          []Supine                       []Intermittent   []Continuous Lbs:  [] before manual  [] after manual  []w/heat    []  Ultrasound: []Continuous   [] Pulsed at:                            []1MHz   []3MHz Location:  W/cm2:    []  Paraffin         Location:  []w/heat    []  Ice     []  Heat  []  Ice massage Position:  Location:    []  Laser  []  Other: Position:  Location:    []  Vasopneumatic Device Pressure:       [] lo [] med [] hi   Temperature:    [x] Skin assessment post-treatment:  [x]intact []redness- no adverse reaction    []redness - adverse reaction:     55 min Therapeutic Exercise:  [x] See flow sheet :   Rationale: increase ROM, increase strength, improve coordination, improve balance and increase proprioception to improve the patients ability to sit, stand, lift, reach, carry and complete ADL's    With   [x] TE   [] TA   [] neuro   [] other: Patient Education: [x] Review HEP    [] Progressed/Changed HEP based on:   [] positioning   [] body mechanics   [] transfers   [x] heat/ice application    [] other:      Other Objective/Functional Measures:   No increased pain with interventions    Shoulder Flexion: R 155 deg L 150 deg  Lumbar Flexion: 4 inches    Pain Level (0-10 scale) post treatment: 0    ASSESSMENT/Changes in Function:     Patient will continue to benefit from skilled PT services to modify and progress therapeutic interventions, address functional mobility deficits, address ROM deficits, address strength deficits, analyze and address soft tissue restrictions, analyze and cue movement patterns, analyze and modify body mechanics/ergonomics and assess and modify postural abnormalities to attain remaining goals. []  See Plan of Care  [x]  See progress note/recertification  []  See Discharge Summary         Progress towards goals / Updated goals:   Patient continues to require verbal cues to complete exercises with correct form and postural awareness. Patient was able to advance several exercises this visit and is progressing well towards goals.     PLAN  [x]  Upgrade activities as tolerated     [x]  Continue plan of care  []  Update interventions per flow sheet       []  Discharge due to:_  []  Other:_      Parrish Greenfield, PT 11/27/2017  12:15 PM

## 2017-11-30 ENCOUNTER — HOSPITAL ENCOUNTER (OUTPATIENT)
Dept: PHYSICAL THERAPY | Age: 47
Discharge: HOME | End: 2017-11-30
Payer: COMMERCIAL

## 2017-11-30 PROCEDURE — 97110 THERAPEUTIC EXERCISES: CPT

## 2017-11-30 PROCEDURE — 97014 ELECTRIC STIMULATION THERAPY: CPT

## 2017-11-30 NOTE — PROGRESS NOTES
PT DAILY TREATMENT NOTE 2-15    Patient Name: Kiley Madison  Date:2017  : 1970  [x]  Patient  Verified  Payor: Dmitriy Glover / Plan: Gregor Ahuja Se HMO / Product Type: HMO /    In time: 8:00a  Out time: 9:10a  Total Treatment Time (min): 70  Visit #: 9    Treatment Area: Chronic pain syndrome [G89.4]    SUBJECTIVE  Pain Level (0-10 scale): 6/10  Any medication changes, allergies to medications, adverse drug reactions, diagnosis change, or new procedure performed?: [x] No    [] Yes (see summary sheet for update)  Subjective functional status/changes:   [] No changes reported  Patient reports she took pain medicine last night/early this morning. Patient reports she has been feeling sick today.     OBJECTIVE    Modality rationale: decrease pain and increase tissue extensibility to improve the patients ability to sit, stand, lift, reach, carry and complete ADL's   Min Type Additional Details   15 [x] Estim: []Att   [x]Unatt        []TENS instruct                  []IFC  [x]Premod   []NMES                     []Other:  []w/US   []w/ice   [x]w/heat  Position: supine  Location:lumbar    []  Traction: [] Cervical       []Lumbar                       [] Prone          []Supine                       []Intermittent   []Continuous Lbs:  [] before manual  [] after manual  []w/heat    []  Ultrasound: []Continuous   [] Pulsed at:                            []1MHz   []3MHz Location:  W/cm2:    []  Paraffin         Location:  []w/heat    []  Ice     []  Heat  []  Ice massage Position:  Location:    []  Laser  []  Other: Position:  Location:    []  Vasopneumatic Device Pressure:       [] lo [] med [] hi   Temperature:    [x] Skin assessment post-treatment:  [x]intact []redness- no adverse reaction    []redness - adverse reaction:     55 min Therapeutic Exercise:  [x] See flow sheet :   Rationale: increase ROM, increase strength, improve coordination, improve balance and increase proprioception to improve the patients ability to sit, stand, lift, reach, carry and complete ADL's    With   [x] TE   [] TA   [] neuro   [] other: Patient Education: [x] Review HEP    [] Progressed/Changed HEP based on:   [] positioning   [] body mechanics   [] transfers   [x] heat/ice application    [] other:      Other Objective/Functional Measures:   No increased pain with interventions    Pain Level (0-10 scale) post treatment: 1    ASSESSMENT/Changes in Function:     Patient will continue to benefit from skilled PT services to modify and progress therapeutic interventions, address functional mobility deficits, address ROM deficits, address strength deficits, analyze and address soft tissue restrictions, analyze and cue movement patterns, analyze and modify body mechanics/ergonomics and assess and modify postural abnormalities to attain remaining goals. []  See Plan of Care  [x]  See progress note/recertification  []  See Discharge Summary         Progress towards goals / Updated goals:   Patient able to advance several exercises with mod fatigue noted and is progress well towards goals. Patient required verbal cues for postural awareness and proper mechanics.     PLAN  [x]  Upgrade activities as tolerated     [x]  Continue plan of care  []  Update interventions per flow sheet       []  Discharge due to:_  []  Other:_      Herson Veras 11/30/2017  12:15 PM

## 2017-12-04 ENCOUNTER — HOSPITAL ENCOUNTER (OUTPATIENT)
Dept: PHYSICAL THERAPY | Age: 47
Discharge: HOME | End: 2017-12-04
Payer: SUBSIDIZED

## 2017-12-04 PROCEDURE — 97014 ELECTRIC STIMULATION THERAPY: CPT

## 2017-12-04 PROCEDURE — 97110 THERAPEUTIC EXERCISES: CPT

## 2017-12-04 NOTE — PROGRESS NOTES
PT DAILY TREATMENT NOTE 2-15    Patient Name: Blessing Del Toro  Date:2017  : 1970  [x]  Patient  Verified  Payor: Arabella Tatum / Plan: Gregor Ahuja Se HMO / Product Type: HMO /    In time: 8:10a  Out time: 9:20a  Total Treatment Time (min): 79  Visit #: 10    Treatment Area: Chronic pain syndrome [G89.4]    SUBJECTIVE  Pain Level (0-10 scale): 4-5/10  Any medication changes, allergies to medications, adverse drug reactions, diagnosis change, or new procedure performed?: [x] No    [] Yes (see summary sheet for update)  Subjective functional status/changes:   [] No changes reported  Patient reports reports she got the injections Friday and is still a little sore from that, but feels better than she had been.     OBJECTIVE    Modality rationale: decrease pain and increase tissue extensibility to improve the patients ability to sit, stand, lift, reach, carry and complete ADL's   Min Type Additional Details   15 [x] Estim: []Att   [x]Unatt        []TENS instruct                  []IFC  [x]Premod   []NMES                     []Other:  []w/US   []w/ice   [x]w/heat  Position: supine  Location:lumbar    []  Traction: [] Cervical       []Lumbar                       [] Prone          []Supine                       []Intermittent   []Continuous Lbs:  [] before manual  [] after manual  []w/heat    []  Ultrasound: []Continuous   [] Pulsed at:                            []1MHz   []3MHz Location:  W/cm2:    []  Paraffin         Location:  []w/heat    []  Ice     []  Heat  []  Ice massage Position:  Location:    []  Laser  []  Other: Position:  Location:    []  Vasopneumatic Device Pressure:       [] lo [] med [] hi   Temperature:    [x] Skin assessment post-treatment:  [x]intact []redness- no adverse reaction    []redness - adverse reaction:     55 min Therapeutic Exercise:  [x] See flow sheet :   Rationale: increase ROM, increase strength, improve coordination, improve balance and increase proprioception to improve the patients ability to sit, stand, lift, reach, carry and complete ADL's    With   [x] TE   [] TA   [] neuro   [] other: Patient Education: [x] Review HEP    [] Progressed/Changed HEP based on:   [] positioning   [] body mechanics   [] transfers   [x] heat/ice application    [] other:      Other Objective/Functional Measures:   No increased pain with interventions, mod fatigue    Pain Level (0-10 scale) post treatment: 1 \"really sore\"    ASSESSMENT/Changes in Function:     Patient will continue to benefit from skilled PT services to modify and progress therapeutic interventions, address functional mobility deficits, address ROM deficits, address strength deficits, analyze and address soft tissue restrictions, analyze and cue movement patterns, analyze and modify body mechanics/ergonomics and assess and modify postural abnormalities to attain remaining goals. []  See Plan of Care  [x]  See progress note/recertification  []  See Discharge Summary         Progress towards goals / Updated goals:   Patient demonstrates increased difficulty with therapeutic exercises with mod fatigue present. Patient required verbal cues for proper exercise reproduction and is making progress towards goals.     PLAN  [x]  Upgrade activities as tolerated     [x]  Continue plan of care  []  Update interventions per flow sheet       []  Discharge due to:_  []  Other:_      Jus Riley 12/4/2017  12:15 PM

## 2017-12-07 ENCOUNTER — APPOINTMENT (OUTPATIENT)
Dept: PHYSICAL THERAPY | Age: 47
End: 2017-12-07
Payer: SUBSIDIZED

## 2017-12-10 ENCOUNTER — HOSPITAL ENCOUNTER (EMERGENCY)
Age: 47
Discharge: HOME | End: 2017-12-10
Attending: FAMILY MEDICINE

## 2017-12-10 VITALS
TEMPERATURE: 97.8 F | BODY MASS INDEX: 34.29 KG/M2 | RESPIRATION RATE: 20 BRPM | HEIGHT: 65 IN | HEART RATE: 85 BPM | DIASTOLIC BLOOD PRESSURE: 77 MMHG | WEIGHT: 205.8 LBS | SYSTOLIC BLOOD PRESSURE: 147 MMHG | OXYGEN SATURATION: 98 %

## 2017-12-10 DIAGNOSIS — J00 COMMON COLD: Primary | ICD-10-CM

## 2017-12-10 RX ORDER — FLUTICASONE PROPIONATE 50 MCG
2 SPRAY, SUSPENSION (ML) NASAL DAILY
Qty: 1 BOTTLE | Refills: 0 | Status: SHIPPED | OUTPATIENT
Start: 2017-12-10 | End: 2017-12-20

## 2017-12-10 RX ORDER — PROMETHAZINE HYDROCHLORIDE AND CODEINE PHOSPHATE 6.25; 1 MG/5ML; MG/5ML
5 SOLUTION ORAL
Qty: 120 ML | Refills: 0 | Status: SHIPPED | OUTPATIENT
Start: 2017-12-10 | End: 2017-12-18

## 2017-12-10 NOTE — UC PROVIDER NOTE
HPI Comments: There have been no sick contacts. Patient is a 56 y.o. female presenting with cold symptoms. The history is provided by the patient. Cold Symptoms    The current episode started more than 2 days ago. The problem has not changed since onset. There has been no fever. Associated symptoms include congestion, sore throat and cough. Pertinent negatives include no swollen glands. She has tried nothing for the symptoms. Past Medical History:   Diagnosis Date    Allergic rhinitis 6/14/2013    Asthma     Chronic insomnia 3/28/2017    GERD (gastroesophageal reflux disease)     Headache     Headache(784.0)     Hypercholesteremia 1/23/2012    Non morbid obesity 3/28/2017    Snoring     Vitamin D deficiency 10/20/2015        Past Surgical History:   Procedure Laterality Date    ENDOSCOPY, COLON, DIAGNOSTIC  12/03 & 04/09    HX GYN      Laproscopy, BTL,TVH    HX GYN      hysterectomy         Family History   Problem Relation Age of Onset    Hypertension Father     Kidney Disease Maternal Grandmother     Hypertension Maternal Grandmother     Heart Attack Paternal Grandfather     Hypertension Mother     Cancer Maternal Grandfather      lung        Social History     Social History    Marital status:      Spouse name: N/A    Number of children: N/A    Years of education: N/A     Occupational History    Not on file. Social History Main Topics    Smoking status: Never Smoker    Smokeless tobacco: Never Used    Alcohol use Yes      Comment: Occasionally    Drug use: No    Sexual activity: Yes     Partners: Male     Birth control/ protection: None     Other Topics Concern    Not on file     Social History Narrative                ALLERGIES: Latex and Aspirin    Review of Systems   Constitutional: Negative for fever. HENT: Positive for congestion and sore throat. Respiratory: Positive for cough.         Vitals:    12/10/17 0912   BP: 147/77   Pulse: 85   Resp: 20 Temp: 97.8 °F (36.6 °C)   SpO2: 98%   Weight: 93.4 kg (205 lb 12.8 oz)   Height: 5' 5\" (1.651 m)       Physical Exam   Constitutional: She is oriented to person, place, and time. She appears well-developed and well-nourished. HENT:   Head: Normocephalic and atraumatic. Right Ear: External ear normal.   Left Ear: External ear normal.   Mouth/Throat: Oropharynx is clear and moist.   Eyes: Conjunctivae and EOM are normal.   Neck: Normal range of motion. Neck supple. Cardiovascular: Normal rate, regular rhythm and normal heart sounds. Pulmonary/Chest: Effort normal and breath sounds normal. No respiratory distress. She has no wheezes. She has no rales. Lymphadenopathy:     She has no cervical adenopathy. Neurological: She is alert and oriented to person, place, and time. Skin: Skin is warm and dry. Psychiatric: She has a normal mood and affect. Her behavior is normal.   Nursing note and vitals reviewed.       MDM     Differential Diagnosis; Clinical Impression; Plan:     Common cold- Symptomatic care  Progress:   Patient progress:  Stable      Procedures

## 2017-12-10 NOTE — DISCHARGE INSTRUCTIONS
Upper Respiratory Infection (Cold): Care Instructions  Your Care Instructions    An upper respiratory infection, or URI, is an infection of the nose, sinuses, or throat. URIs are spread by coughs, sneezes, and direct contact. The common cold is the most frequent kind of URI. The flu and sinus infections are other kinds of URIs. Almost all URIs are caused by viruses. Antibiotics won't cure them. But you can treat most infections with home care. This may include drinking lots of fluids and taking over-the-counter pain medicine. You will probably feel better in 4 to 10 days. The doctor has checked you carefully, but problems can develop later. If you notice any problems or new symptoms, get medical treatment right away. Follow-up care is a key part of your treatment and safety. Be sure to make and go to all appointments, and call your doctor if you are having problems. It's also a good idea to know your test results and keep a list of the medicines you take. How can you care for yourself at home? · To prevent dehydration, drink plenty of fluids, enough so that your urine is light yellow or clear like water. Choose water and other caffeine-free clear liquids until you feel better. If you have kidney, heart, or liver disease and have to limit fluids, talk with your doctor before you increase the amount of fluids you drink. · Take an over-the-counter pain medicine, such as acetaminophen (Tylenol), ibuprofen (Advil, Motrin), or naproxen (Aleve). Read and follow all instructions on the label. · Before you use cough and cold medicines, check the label. These medicines may not be safe for young children or for people with certain health problems. · Be careful when taking over-the-counter cold or flu medicines and Tylenol at the same time. Many of these medicines have acetaminophen, which is Tylenol. Read the labels to make sure that you are not taking more than the recommended dose.  Too much acetaminophen (Tylenol) can be harmful. · Get plenty of rest.  · Do not smoke or allow others to smoke around you. If you need help quitting, talk to your doctor about stop-smoking programs and medicines. These can increase your chances of quitting for good. When should you call for help? Call 911 anytime you think you may need emergency care. For example, call if:  ? · You have severe trouble breathing. ?Call your doctor now or seek immediate medical care if:  ? · You seem to be getting much sicker. ? · You have new or worse trouble breathing. ? · You have a new or higher fever. ? · You have a new rash. ? Watch closely for changes in your health, and be sure to contact your doctor if:  ? · You have a new symptom, such as a sore throat, an earache, or sinus pain. ? · You cough more deeply or more often, especially if you notice more mucus or a change in the color of your mucus. ? · You do not get better as expected. Where can you learn more? Go to http://mario-barry.info/. Enter Z794 in the search box to learn more about \"Upper Respiratory Infection (Cold): Care Instructions. \"  Current as of: May 12, 2017  Content Version: 11.4  © 9709-9368 [employer]. Care instructions adapted under license by [employer] (which disclaims liability or warranty for this information). If you have questions about a medical condition or this instruction, always ask your healthcare professional. [place] [address] any warranty or liability for your use of this information.

## 2017-12-11 ENCOUNTER — APPOINTMENT (OUTPATIENT)
Dept: PHYSICAL THERAPY | Age: 47
End: 2017-12-11
Payer: SUBSIDIZED

## 2017-12-13 RX ORDER — IPRATROPIUM BROMIDE AND ALBUTEROL SULFATE 2.5; .5 MG/3ML; MG/3ML
3 SOLUTION RESPIRATORY (INHALATION)
Qty: 30 NEBULE | Refills: 0 | Status: SHIPPED | OUTPATIENT
Start: 2017-12-13 | End: 2021-01-13

## 2017-12-13 RX ORDER — ALBUTEROL SULFATE 90 UG/1
2 AEROSOL, METERED RESPIRATORY (INHALATION)
Qty: 1 INHALER | Refills: 0 | Status: SHIPPED | OUTPATIENT
Start: 2017-12-13 | End: 2019-11-13 | Stop reason: SDUPTHER

## 2017-12-13 RX ORDER — BUDESONIDE AND FORMOTEROL FUMARATE DIHYDRATE 160; 4.5 UG/1; UG/1
AEROSOL RESPIRATORY (INHALATION)
OUTPATIENT
Start: 2017-12-13

## 2017-12-13 NOTE — TELEPHONE ENCOUNTER
Pt called stating she's having trouble with cough from asthma, but just noticed her inhaler is  and is requesting refills on her asthma medications be sent to [address] today please.   Ldm

## 2017-12-13 NOTE — TELEPHONE ENCOUNTER
I have never prescribed these for her. How many puffs of Symbicort does she use and how often. I will refill these for now, but I need to see her in the next several days.

## 2017-12-14 ENCOUNTER — APPOINTMENT (OUTPATIENT)
Dept: PHYSICAL THERAPY | Age: 47
End: 2017-12-14
Payer: SUBSIDIZED

## 2017-12-18 ENCOUNTER — HOSPITAL ENCOUNTER (EMERGENCY)
Age: 47
Discharge: HOME | End: 2017-12-18
Attending: FAMILY MEDICINE

## 2017-12-18 ENCOUNTER — HOSPITAL ENCOUNTER (OUTPATIENT)
Dept: PHYSICAL THERAPY | Age: 47
End: 2017-12-18
Payer: SUBSIDIZED

## 2017-12-18 VITALS
RESPIRATION RATE: 18 BRPM | DIASTOLIC BLOOD PRESSURE: 71 MMHG | HEIGHT: 66 IN | WEIGHT: 209 LBS | TEMPERATURE: 98.9 F | BODY MASS INDEX: 33.59 KG/M2 | HEART RATE: 81 BPM | OXYGEN SATURATION: 98 % | SYSTOLIC BLOOD PRESSURE: 139 MMHG

## 2017-12-18 DIAGNOSIS — J20.9 ACUTE BRONCHITIS, UNSPECIFIED ORGANISM: Primary | ICD-10-CM

## 2017-12-18 RX ORDER — DOXYCYCLINE 100 MG/1
100 CAPSULE ORAL 2 TIMES DAILY
Qty: 20 CAP | Refills: 0 | Status: SHIPPED | OUTPATIENT
Start: 2017-12-18 | End: 2017-12-28

## 2017-12-18 RX ORDER — PREDNISONE 10 MG/1
TABLET ORAL
Qty: 48 TAB | Refills: 0 | Status: SHIPPED | OUTPATIENT
Start: 2017-12-18 | End: 2018-03-13

## 2017-12-18 NOTE — DISCHARGE INSTRUCTIONS
Bronchitis: Care Instructions  Your Care Instructions    Bronchitis is inflammation of the bronchial tubes, which carry air to the lungs. The tubes swell and produce mucus, or phlegm. The mucus and inflamed bronchial tubes make you cough. You may have trouble breathing. Most cases of bronchitis are caused by viruses like those that cause colds. Antibiotics usually do not help and they may be harmful. Bronchitis usually develops rapidly and lasts about 2 to 3 weeks in otherwise healthy people. Follow-up care is a key part of your treatment and safety. Be sure to make and go to all appointments, and call your doctor if you are having problems. It's also a good idea to know your test results and keep a list of the medicines you take. How can you care for yourself at home? · Take all medicines exactly as prescribed. Call your doctor if you think you are having a problem with your medicine. · Get some extra rest.  · Take an over-the-counter pain medicine, such as acetaminophen (Tylenol), ibuprofen (Advil, Motrin), or naproxen (Aleve) to reduce fever and relieve body aches. Read and follow all instructions on the label. · Do not take two or more pain medicines at the same time unless the doctor told you to. Many pain medicines have acetaminophen, which is Tylenol. Too much acetaminophen (Tylenol) can be harmful. · Take an over-the-counter cough medicine that contains dextromethorphan to help quiet a dry, hacking cough so that you can sleep. Avoid cough medicines that have more than one active ingredient. Read and follow all instructions on the label. · Breathe moist air from a humidifier, hot shower, or sink filled with hot water. The heat and moisture will thin mucus so you can cough it out. · Do not smoke. Smoking can make bronchitis worse. If you need help quitting, talk to your doctor about stop-smoking programs and medicines. These can increase your chances of quitting for good.   When should you call for help? Call 911 anytime you think you may need emergency care. For example, call if:  ? · You have severe trouble breathing. ?Call your doctor now or seek immediate medical care if:  ? · You have new or worse trouble breathing. ? · You cough up dark brown or bloody mucus (sputum). ? · You have a new or higher fever. ? · You have a new rash. ? Watch closely for changes in your health, and be sure to contact your doctor if:  ? · You cough more deeply or more often, especially if you notice more mucus or a change in the color of your mucus. ? · You are not getting better as expected. Where can you learn more? Go to http://mario-barry.info/. Enter H333 in the search box to learn more about \"Bronchitis: Care Instructions. \"  Current as of: May 12, 2017  Content Version: 11.4  © 5977-4455 [employer]. Care instructions adapted under license by [employer] (which disclaims liability or warranty for this information). If you have questions about a medical condition or this instruction, always ask your healthcare professional. [address] any warranty or liability for your use of this information.

## 2017-12-21 ENCOUNTER — APPOINTMENT (OUTPATIENT)
Dept: PHYSICAL THERAPY | Age: 47
End: 2017-12-21
Payer: SUBSIDIZED

## 2017-12-26 ENCOUNTER — APPOINTMENT (OUTPATIENT)
Dept: PHYSICAL THERAPY | Age: 47
End: 2017-12-26
Payer: SUBSIDIZED

## 2017-12-28 ENCOUNTER — APPOINTMENT (OUTPATIENT)
Dept: PHYSICAL THERAPY | Age: 47
End: 2017-12-28
Payer: SUBSIDIZED

## 2018-01-02 ENCOUNTER — TELEPHONE (OUTPATIENT)
Dept: FAMILY MEDICINE CLINIC | Age: 48
End: 2018-01-02

## 2018-01-02 NOTE — TELEPHONE ENCOUNTER
Pt called to advise she's just been admitted to [facility] after recent discharge for asthma, now has the flu and elevated white blood cell count.   Letty

## 2018-01-12 ENCOUNTER — TELEPHONE (OUTPATIENT)
Dept: FAMILY MEDICINE CLINIC | Age: 48
End: 2018-01-12

## 2018-01-12 NOTE — TELEPHONE ENCOUNTER
On Call Note:     Call received from patient with complaint of pressure of both eyes x 1 day. Associated with redness, light sensitivity, drainage. She denies headache, nausea, vomiting, ear pain/pressure. Reports recent discharge from [facility] for asthma/flu. States that she is unable to tolerate minimal light. She will need further evaluation. Recommended that she call the office to schedule appointment, but she does not feel as though she can wait. Discussed Urgent Care evaluation to which she is in agreement. Due to her complaint of light sensitivity, it was recommended that she have someone drive her.      Kylah Swanson MD

## 2018-01-15 ENCOUNTER — TELEPHONE (OUTPATIENT)
Dept: FAMILY MEDICINE CLINIC | Age: 48
End: 2018-01-15

## 2018-01-15 NOTE — TELEPHONE ENCOUNTER
----- Message from Jonathan Ferguson sent at 1/15/2018  7:35 AM EST -----  Regarding: Dr. Kerrie Dimas  The patient is requesting a call back from the doctor or nurse with an appointment to follow up for a [facility] ER visit on 1/12/18 for a spinal tab done for a headache and dizziness and other issues that have gone on since December. [phone]    Called patient and offered appointment with Dr Lolis Malagon for this afternoon but patient states she has an appointment with the neurologist this afternoon and she will call us back if she still needs to follow-up with Dr Lolis Malagon.

## 2018-01-19 NOTE — ANCILLARY DISCHARGE INSTRUCTIONS
[address] [facility], [address] [place] [address] Drive  Phone: [phone]  Fax: [phone]    Medicaid Discharge Summary  2-15    Patient name: Ely Espinoza  : 1970  Provider#: [id number]  Referral source: Rohit Roman NP      Medical/Treatment Diagnosis: Chronic pain syndrome [G89.4]     Prior Hospitalization: see medical history     Comorbidities: See Plan of Care  Prior Level of Function:See Plan of Care  Medications: Verified on Patient Summary List    Start of Care: 10/23/17     Onset Date:17   Visits from Start of Care: 10    Missed Visits: 1  Reporting Period : 10/23/17 to 18      ASSESSMENT/SUMMARY OF CARE:  The patient has not been seen since 17. Below is the patient's status at last re-evaluation 17: The patient has completed 8 visits and has made significant gains in shoulder AROM and demonstrates improved activity tolerance. The patient continues to be limited by pain and weakness, affecting her ability to perform household chores.  The patient has met all short term goals and would benefit from continued PT to reach long term goals.     Short Term Goals: To be accomplished in 4 weeks:  1) The patient will be independent with introductory HEP  Status at last note/certification: not met  Status at progress note: met  2) The patient will improve lumbar flexion AROM to 2 inches from floor without an increase in pain to improve ease with dressing  Status at last note/certification: not met  Status at progress note: not met  3) The patient will improve L shoulder flexion AROM to 140 to improve ease with reaching tasks  Status at last note/certification: not met  Status at progress note: met  Long Term Goals: To be accomplished in 12 weeks:  1) The patient will demonstrate 5/5 BUE strength to improve ease with lifting tasks  Status at last note/certification: not met  Status at progress note: not met  2) The patient will return to work without an increase in pain  Status at last note/certification: not met  Status at progress note: not met  3) The patient will resume fitness routine without an increase in pain  Status at last note/certification: not met  Status at progress note: not met     RECOMMENDATIONS:  [x]Discontinue therapy: [x]Patient has reached or is progressing toward set goals      []Patient is non-compliant or has abdicated      []Due to lack of appreciable progress towards set goals      []Other    Oswaldo Goldmann, PT 1/19/2018 9:03 AM    ______________________________________________________________________    NOTE TO PHYSICIAN:  Please complete the following and fax to: [facility]: [phone]  Retain this original for your records. If you are unable to process this request in 24 hours, please contact our office.        [de-identified] Signature:____________________  Date:____________Time:_________

## 2018-01-29 ENCOUNTER — TELEPHONE (OUTPATIENT)
Dept: FAMILY MEDICINE CLINIC | Age: 48
End: 2018-01-29

## 2018-01-29 ENCOUNTER — HOSPITAL ENCOUNTER (OUTPATIENT)
Dept: MRI IMAGING | Age: 48
Discharge: HOME | End: 2018-01-29
Attending: PSYCHIATRY & NEUROLOGY
Payer: COMMERCIAL

## 2018-01-29 ENCOUNTER — HOSPITAL ENCOUNTER (OUTPATIENT)
Dept: MAMMOGRAPHY | Age: 48
Discharge: HOME | End: 2018-01-29
Attending: FAMILY MEDICINE
Payer: COMMERCIAL

## 2018-01-29 VITALS — BODY MASS INDEX: 36.87 KG/M2 | WEIGHT: 225 LBS

## 2018-01-29 DIAGNOSIS — Q99.8 CHROMOSOME XQ27.3-Q28 DUPLICATION SYNDROME: ICD-10-CM

## 2018-01-29 DIAGNOSIS — Z12.39 SCREENING BREAST EXAMINATION: ICD-10-CM

## 2018-01-29 PROCEDURE — 77067 SCR MAMMO BI INCL CAD: CPT

## 2018-01-29 PROCEDURE — A9576 INJ PROHANCE MULTIPACK: HCPCS | Performed by: RADIOLOGY

## 2018-01-29 PROCEDURE — 74011250636 HC RX REV CODE- 250/636: Performed by: RADIOLOGY

## 2018-01-29 PROCEDURE — 70553 MRI BRAIN STEM W/O & W/DYE: CPT

## 2018-01-29 RX ADMIN — GADOTERIDOL 20 ML: 279.3 INJECTION, SOLUTION INTRAVENOUS at 12:43

## 2018-01-29 NOTE — TELEPHONE ENCOUNTER
Pt called returning missed call about recent hospitalizations for asthma exacerbation, flu, and URI, stating she will stop by office to reschedule follow up appointment with Dr. Ysabel Valdez while she's here today for MRI and mammogram appointments.   Letty

## 2018-02-01 ENCOUNTER — HOSPITAL ENCOUNTER (OUTPATIENT)
Dept: MRI IMAGING | Age: 48
Discharge: HOME | End: 2018-02-01
Attending: NURSE PRACTITIONER
Payer: COMMERCIAL

## 2018-02-01 DIAGNOSIS — G89.4 CHRONIC PAIN SYNDROME: ICD-10-CM

## 2018-02-01 PROCEDURE — 72148 MRI LUMBAR SPINE W/O DYE: CPT

## 2018-03-12 ENCOUNTER — TELEPHONE (OUTPATIENT)
Dept: FAMILY MEDICINE CLINIC | Age: 48
End: 2018-03-12

## 2018-03-12 NOTE — TELEPHONE ENCOUNTER
----- Message from Russell Dee sent at 3/12/2018 10:00 AM EDT -----  Regarding: Dr. Adonay Madrigal: [phone]  Pt. Requesting to speak with Dr. Lacey Carrillo in regards to scheduling an appt for March 20th 2018.

## 2018-03-13 ENCOUNTER — OFFICE VISIT (OUTPATIENT)
Dept: FAMILY MEDICINE CLINIC | Age: 48
End: 2018-03-13

## 2018-03-13 ENCOUNTER — HOSPITAL ENCOUNTER (OUTPATIENT)
Dept: GENERAL RADIOLOGY | Age: 48
Discharge: HOME | End: 2018-03-13
Payer: COMMERCIAL

## 2018-03-13 ENCOUNTER — TELEPHONE (OUTPATIENT)
Dept: FAMILY MEDICINE CLINIC | Age: 48
End: 2018-03-13

## 2018-03-13 VITALS
TEMPERATURE: 97.8 F | SYSTOLIC BLOOD PRESSURE: 100 MMHG | BODY MASS INDEX: 34.23 KG/M2 | OXYGEN SATURATION: 97 % | DIASTOLIC BLOOD PRESSURE: 74 MMHG | HEART RATE: 79 BPM | WEIGHT: 213 LBS | RESPIRATION RATE: 18 BRPM | HEIGHT: 66 IN

## 2018-03-13 DIAGNOSIS — R53.83 FATIGUE, UNSPECIFIED TYPE: ICD-10-CM

## 2018-03-13 DIAGNOSIS — R06.00 DYSPNEA, UNSPECIFIED TYPE: ICD-10-CM

## 2018-03-13 DIAGNOSIS — J45.20 MILD INTERMITTENT ASTHMA WITHOUT COMPLICATION: Primary | ICD-10-CM

## 2018-03-13 DIAGNOSIS — J45.20 MILD INTERMITTENT ASTHMA WITHOUT COMPLICATION: ICD-10-CM

## 2018-03-13 PROCEDURE — 71046 X-RAY EXAM CHEST 2 VIEWS: CPT

## 2018-03-13 RX ORDER — CYCLOBENZAPRINE HCL 5 MG
5 TABLET ORAL
COMMUNITY
End: 2019-04-19

## 2018-03-13 NOTE — MR AVS SNAPSHOT
[address] [phone]     Patient: Steffi Kenny  MRN: [id number]  :1970               Visit Information     Date & Time Provider Department Dept. Phone Encounter #    3/13/2018  1:30 PM Alfreda Heredia MD [address] [phone] [id number]      Follow-up Instructions     Return in about 1 month (around 2018) for review PFTs and labs, memory loss. Upcoming Health Maintenance        Date Due    PAP AKA CERVICAL CYTOLOGY 10/9/1992    Influenza Age 9 to Adult 2017    DTaP/Tdap/Td series (2 - Td) 3/28/2027      Allergies as of 3/13/2018  Review Complete On: 3/13/2018 By: Alfreda Heredia MD       Severity Noted Reaction Type Reactions    Latex Medium 2013   Topical Rash    Aspirin  2017    Other (comments)    \"It doesn't digest in my system\"      Current Immunizations  Reviewed on 3/28/2017    No immunizations on file.        Not reviewed this visit   You Were Diagnosed With        Codes Comments    Mild intermittent asthma without complication    -  Primary ICD-10-CM: J45.20  ICD-9-CM: 493.90     Weight gain     ICD-10-CM: R63.5  ICD-9-CM: 783.1     Dyspnea, unspecified type     ICD-10-CM: R06.00  ICD-9-CM: 786.09     Fatigue, unspecified type     ICD-10-CM: R53.83  ICD-9-CM: 780.79       Vitals     BP Pulse Temp Resp Height(growth percentile) Weight(growth percentile)    100/74 79 97.8 °F (36.6 °C) (Oral) 18 5' 5.5\" (1.664 m) 213 lb (96.6 kg)    SpO2 PF BMI OB Status Smoking Status       97% 300 L/min 34.91 kg/m2 Hysterectomy Never Smoker     Vitals History      BMI and BSA Data     Body Mass Index Body Surface Area    34.91 kg/m 2 2.11 m 2         Preferred Pharmacy       Pharmacy Name Phone    [facility] [address] [facility], [facility] [place] [address] Drive [phone]         Your Updated Medication List          This list is accurate as of 3/13/18 2:32 PM.  Always use your most recent med list.                albuterol 90 mcg/actuation inhaler   Commonly known as:  PROVENTIL HFA, VENTOLIN HFA, PROAIR HFA   Take 2 Puffs by inhalation every four (4) hours as needed for Wheezing or Shortness of Breath. albuterol-ipratropium 2.5 mg-0.5 mg/3 ml Nebu   Commonly known as:  DUO-NEB   3 mL by Nebulization route every four (4) hours as needed. butalbital-acetaminophen-caff -40 mg per capsule   Commonly known as:  FIORICET   TAKE 1 TO 2 CAPSULE BY MOUTH EVERY 8 HOURS AS NEEDED FOR HEADACHES       CAMBIA 50 mg Pwpk   Generic drug:  Diclofenac Potassium   MIX AND DRINK 1 PACKET AT ONSET OF HEADACHE       cyclobenzaprine 5 mg tablet   Commonly known as:  FLEXERIL   Take 5 mg by mouth. methocarbamol 500 mg tablet   Commonly known as:  ROBAXIN   Take 1 Tab by mouth every six (6) hours as needed. pravastatin 20 mg tablet   Commonly known as:  PRAVACHOL   TAKE 1 TABLET BY MOUTH EVERY NIGHT       temazepam 30 mg capsule   Commonly known as:  RESTORIL   TAKE 1 CAPSULES BY MOUTH EVERY NIGHT AT BEDTIME AS NEEDED FOR SLEEP               We Performed the Following     CBC WITH AUTOMATED DIFF [44162 CPT(R)]     METABOLIC PANEL, COMPREHENSIVE [79415 CPT(R)]     TSH 3RD GENERATION [72537 CPT(R)]       Follow-up Instructions     Return in about 1 month (around 4/13/2018) for review PFTs and labs, memory loss. To-Do List     03/13/2018     Imaging:  XR CHEST PA LAT        Around 03/14/2018     PFT:  PULMONARY FUNCTION TEST          Introducing [facility]! Dear Dania Santos: Thank you for requesting a USEREADY account. Our records indicate that you already have an active USEREADY account. You can access your account anytime at [email or web address]     Did you know that you can access your hospital and ER discharge instructions at any time in USEREADY?   You can also review all of your test results from your hospital stay or ER visit. Additional Information    If you have questions, please visit the Frequently Asked Questions section of the [employer] website at [email or web address] Remember, NextDigest is NOT to be used for urgent needs. For medical emergencies, dial 911. Now available from your iPhone and Android! Please provide this summary of care documentation to your next provider. Your primary care clinician is listed as Daniela Sandhu. If you have any questions after today's visit, please call [phone].

## 2018-03-13 NOTE — PROGRESS NOTES
HISTORY OF PRESENT ILLNESS  Yonathan Barros is a 56 y.o. female. HPI Comments: Yonathan Barros is here for asthma follow up. Evidently, she was hospitalized 12/22 - 12/28/17, the 1/1/ - 1/3/18, the first time for a viral infection with asthma, the second time for flu. Evidently, she had 2 LP's due to concerns about meningitis. Now, she feels like she is getting better. Asthma   The history is provided by the patient. This is a chronic problem. The current episode started more than 1 week ago. The problem occurs rarely. The problem has been gradually improving. Associated symptoms include shortness of breath. Pertinent negatives include no chest pain and no headaches. Associated symptoms comments: She gets shortness of breath with prolonged walking. . Nothing aggravates the symptoms. Treatments tried: albuterol, Duo-Nebs, which she hasn't needed to use. The treatment provided significant relief. Review of Systems   Constitutional: Positive for malaise/fatigue. Negative for chills and fever. Weight gain   HENT: Negative for congestion, ear pain and sore throat. Eyes: Negative for discharge and redness. Respiratory: Positive for shortness of breath. Negative for cough and wheezing. Cardiovascular: Positive for leg swelling. Negative for chest pain, orthopnea and PND. Musculoskeletal: Negative for myalgias. Neurological: Negative for headaches. Visit Vitals    /74    Pulse 79    Temp 97.8 °F (36.6 °C) (Oral)    Resp 18    Ht 5' 5.5\" (1.664 m)    Wt 213 lb (96.6 kg)    SpO2 97%     L/min    BMI 34.91 kg/m2     Physical Exam   Constitutional: She is oriented to person, place, and time. She appears well-developed and well-nourished. No distress. Cardiovascular: Normal rate, regular rhythm and normal heart sounds. Exam reveals no gallop and no friction rub. No murmur heard.   Pulmonary/Chest: Effort normal and breath sounds normal. No respiratory distress. She has no wheezes. She has no rales. Musculoskeletal: She exhibits no edema. Neurological: She is alert and oriented to person, place, and time. Skin: Skin is warm and dry. She is not diaphoretic. Nursing note and vitals reviewed. ASSESSMENT and PLAN    ICD-10-CM ICD-9-CM    1. Mild intermittent asthma without complication X16.75 078.06 XR CHEST PA LAT      PULMONARY FUNCTION TEST   2. Dyspnea, unspecified type R06.00 786.09 XR CHEST PA LAT      PULMONARY FUNCTION TEST   3. Fatigue, unspecified type C72.48 738.09 METABOLIC PANEL, COMPREHENSIVE      CBC WITH AUTOMATED DIFF      TSH 3RD GENERATION        Asthma improved, but still with some dyspnea  Ongoing fatigue  chest X-ray  pulmonary function tests  Labs per orders. Follow-up Disposition:  Return in about 1 month (around 4/13/2018) for review PFTs and labs, memory loss. Reviewed plan of care. Patient has provided input and agrees with goals.

## 2018-03-13 NOTE — TELEPHONE ENCOUNTER
----- Message from [place] Sender sent at 3/13/2018  7:08 AM EDT -----  Regarding: /Telephone  Pt called requesting a call back to reschedule a sooner appt for today 03/13/2018. Pt best contact number is [phone].

## 2018-03-13 NOTE — PROGRESS NOTES
Chief Complaint   Patient presents with    Asthma     f/u   [facility] Follow Up     asthma, \"virus-pt cannot remember\"; jw 01/2018     1. Have you been to the ER, urgent care clinic since your last visit? Yes 01/2018 JW  Hospitalized since your last visit? Yes 01/2018 for 3 days at [address]    2. Have you seen or consulted any other health care providers outside of the [address] [place] [address] since your last visit? Include any pap smears or colon screening.  No

## 2018-03-14 ENCOUNTER — TELEPHONE (OUTPATIENT)
Dept: FAMILY MEDICINE CLINIC | Age: 48
End: 2018-03-14

## 2018-03-14 LAB
ALBUMIN SERPL-MCNC: 5 G/DL (ref 3.5–5.5)
ALBUMIN/GLOB SERPL: 1.9 {RATIO} (ref 1.2–2.2)
ALP SERPL-CCNC: 78 IU/L (ref 39–117)
ALT SERPL-CCNC: 15 IU/L (ref 0–32)
AST SERPL-CCNC: 18 IU/L (ref 0–40)
BASOPHILS # BLD AUTO: 0.1 X10E3/UL (ref 0–0.2)
BASOPHILS NFR BLD AUTO: 1 %
BILIRUB SERPL-MCNC: 0.3 MG/DL (ref 0–1.2)
BUN SERPL-MCNC: 10 MG/DL (ref 6–24)
BUN/CREAT SERPL: 11 (ref 9–23)
CALCIUM SERPL-MCNC: 9.8 MG/DL (ref 8.7–10.2)
CHLORIDE SERPL-SCNC: 101 MMOL/L (ref 96–106)
CO2 SERPL-SCNC: 26 MMOL/L (ref 18–29)
CREAT SERPL-MCNC: 0.88 MG/DL (ref 0.57–1)
EOSINOPHIL # BLD AUTO: 0.2 X10E3/UL (ref 0–0.4)
EOSINOPHIL NFR BLD AUTO: 2 %
ERYTHROCYTE [DISTWIDTH] IN BLOOD BY AUTOMATED COUNT: 14.7 % (ref 12.3–15.4)
GFR SERPLBLD CREATININE-BSD FMLA CKD-EPI: 79 ML/MIN/1.73
GFR SERPLBLD CREATININE-BSD FMLA CKD-EPI: 91 ML/MIN/1.73
GLOBULIN SER CALC-MCNC: 2.7 G/DL (ref 1.5–4.5)
GLUCOSE SERPL-MCNC: 96 MG/DL (ref 65–99)
HCT VFR BLD AUTO: 44.8 % (ref 34–46.6)
HGB BLD-MCNC: 14.9 G/DL (ref 11.1–15.9)
IMM GRANULOCYTES # BLD: 0 X10E3/UL (ref 0–0.1)
IMM GRANULOCYTES NFR BLD: 0 %
LYMPHOCYTES # BLD AUTO: 2.6 X10E3/UL (ref 0.7–3.1)
LYMPHOCYTES NFR BLD AUTO: 31 %
MCH RBC QN AUTO: 30.3 PG (ref 26.6–33)
MCHC RBC AUTO-ENTMCNC: 33.3 G/DL (ref 31.5–35.7)
MCV RBC AUTO: 91 FL (ref 79–97)
MONOCYTES # BLD AUTO: 0.8 X10E3/UL (ref 0.1–0.9)
MONOCYTES NFR BLD AUTO: 9 %
NEUTROPHILS # BLD AUTO: 4.7 X10E3/UL (ref 1.4–7)
NEUTROPHILS NFR BLD AUTO: 57 %
PLATELET # BLD AUTO: 429 X10E3/UL (ref 150–379)
POTASSIUM SERPL-SCNC: 4.3 MMOL/L (ref 3.5–5.2)
PROT SERPL-MCNC: 7.7 G/DL (ref 6–8.5)
RBC # BLD AUTO: 4.92 X10E6/UL (ref 3.77–5.28)
SODIUM SERPL-SCNC: 140 MMOL/L (ref 134–144)
TSH SERPL DL<=0.005 MIU/L-ACNC: 0.78 UIU/ML (ref 0.45–4.5)
WBC # BLD AUTO: 8.3 X10E3/UL (ref 3.4–10.8)

## 2018-03-23 ENCOUNTER — HOSPITAL ENCOUNTER (OUTPATIENT)
Dept: PULMONOLOGY | Age: 48
Discharge: HOME | End: 2018-03-23
Attending: FAMILY MEDICINE
Payer: COMMERCIAL

## 2018-03-23 VITALS — HEART RATE: 61 BPM

## 2018-03-23 DIAGNOSIS — J45.20 MILD INTERMITTENT ASTHMA WITHOUT COMPLICATION: ICD-10-CM

## 2018-03-23 DIAGNOSIS — R06.00 DYSPNEA, UNSPECIFIED TYPE: ICD-10-CM

## 2018-03-23 PROCEDURE — 74011000250 HC RX REV CODE- 250: Performed by: FAMILY MEDICINE

## 2018-03-23 PROCEDURE — 94060 EVALUATION OF WHEEZING: CPT

## 2018-03-23 PROCEDURE — 94729 DIFFUSING CAPACITY: CPT

## 2018-03-23 PROCEDURE — 94726 PLETHYSMOGRAPHY LUNG VOLUMES: CPT

## 2018-03-23 RX ORDER — ALBUTEROL SULFATE 0.83 MG/ML
2.5 SOLUTION RESPIRATORY (INHALATION)
Status: COMPLETED | OUTPATIENT
Start: 2018-03-23 | End: 2018-03-23

## 2018-03-23 RX ADMIN — ALBUTEROL SULFATE 2.5 MG: 2.5 SOLUTION RESPIRATORY (INHALATION) at 12:14

## 2018-03-30 NOTE — PROCEDURES
[address] [facility]    Isaiah Richardson.  MR#: [id number]  : 1970  ACCOUNT #: [de-identified]   DATE OF SERVICE: 2018    I have reviewed pulmonary function tests from the patient. Spirometry reveals no evidence of an obstructive lung defect. There is not significant reversibility with administration of bronchodilators. Lung volumes are within normal limits. DLCO is mildly reduced with correction when taking into account alveolar volume. Ace Oconnor MD       [facility] Portal / [place]  D: 2018 15:03     T: 2018 02:36  JOB #: [id number]

## 2018-04-08 ENCOUNTER — TELEPHONE (OUTPATIENT)
Dept: FAMILY MEDICINE CLINIC | Age: 48
End: 2018-04-08

## 2018-04-16 ENCOUNTER — OFFICE VISIT (OUTPATIENT)
Dept: URGENT CARE | Age: 48
End: 2018-04-16

## 2018-04-16 VITALS
OXYGEN SATURATION: 98 % | HEIGHT: 65 IN | HEART RATE: 76 BPM | WEIGHT: 211.6 LBS | RESPIRATION RATE: 18 BRPM | BODY MASS INDEX: 35.25 KG/M2 | DIASTOLIC BLOOD PRESSURE: 78 MMHG | TEMPERATURE: 97.1 F | SYSTOLIC BLOOD PRESSURE: 130 MMHG

## 2018-04-16 DIAGNOSIS — R35.0 URINARY FREQUENCY: Primary | ICD-10-CM

## 2018-04-16 PROBLEM — E66.01 SEVERE OBESITY (BMI 35.0-39.9) WITH COMORBIDITY (HCC): Status: ACTIVE | Noted: 2018-04-16

## 2018-04-16 LAB
BILIRUB UR QL STRIP: NEGATIVE
GLUCOSE UR-MCNC: NEGATIVE MG/DL
KETONES P FAST UR STRIP-MCNC: NEGATIVE MG/DL
PH UR STRIP: 5.5 [PH] (ref 4.6–8)
PROT UR QL STRIP: NEGATIVE
SP GR UR STRIP: 1 (ref 1–1.03)
UA UROBILINOGEN AMB POC: NORMAL (ref 0.2–1)
URINALYSIS CLARITY POC: NORMAL
URINALYSIS COLOR POC: NORMAL
URINE BLOOD POC: NORMAL
URINE LEUKOCYTES POC: NEGATIVE
URINE NITRITES POC: NEGATIVE

## 2018-04-16 RX ORDER — SULFAMETHOXAZOLE AND TRIMETHOPRIM 800; 160 MG/1; MG/1
1 TABLET ORAL 2 TIMES DAILY
Qty: 14 TAB | Refills: 0 | Status: SHIPPED | OUTPATIENT
Start: 2018-04-16 | End: 2018-04-23

## 2018-04-16 NOTE — MR AVS SNAPSHOT
[address] [facility] [address]  [phone]     Patient: Roro Snow  MRN: [id number]  :1970               Visit Information     Date & Time Provider Department Dept. Phone Encounter #    2018 10:30 AM [address] [phone] [id number]      Your Appointments     2018  4:15 PM   SAME DAY with Lexa Lewis MD   [facility]   Appt Note: possible UTI    [address] [facility] [address] [address]            2018 11:15 AM   ROUTINE CARE with MD Ida [facility]   Appt Note: 1 month f/u review pfts and labs, memory loss; 1 month f/u review pfts and labs, memory loss; r/s 1 month f/u review pfts and labs, memory loss    [address] [place] [address] [employer] [facility]           [address] [place] [address] [employer] [employer] [employer] [address] [facility] Maintenance        Date Due    PAP AKA CERVICAL CYTOLOGY 10/9/1992    Influenza Age 9 to Adult 2017    DTaP/Tdap/Td series (2 - Td) 3/28/2027      Allergies as of 2018  Review Complete On: 2018 By: Kathy Walker, RN       Severity Noted Reaction Type Reactions    Latex Medium 2013   Topical Rash    Aspirin  2017    Other (comments)    \"It doesn't digest in my system\"      Current Immunizations  Reviewed on 3/28/2017    No immunizations on file.        Not reviewed this visit   You Were Diagnosed With        Codes Comments    Urinary frequency    -  Primary ICD-10-CM: R35.0  ICD-9-CM: 788.41       Vitals     BP Pulse Temp Resp Height(growth percentile) Weight(growth percentile)    130/78 76 97.1 °F (36.2 °C) 18 5' 5\" (1.651 m) 211 lb 9.6 oz (96 kg)    SpO2 BMI OB Status Smoking Status          98% 35.21 kg/m2 Hysterectomy Never Smoker        BMI and BSA Data     Body Mass Index Body Surface Area    35.21 kg/m 2 2.1 m 2         Preferred Pharmacy       Pharmacy Name Phone    [id number] [place] [phone]         Your Updated Medication List          This list is accurate as of 4/16/18 11:21 AM.  Always use your most recent med list.                albuterol 90 mcg/actuation inhaler   Commonly known as:  PROVENTIL HFA, VENTOLIN HFA, PROAIR HFA   Take 2 Puffs by inhalation every four (4) hours as needed for Wheezing or Shortness of Breath. albuterol-ipratropium 2.5 mg-0.5 mg/3 ml Nebu   Commonly known as:  DUO-NEB   3 mL by Nebulization route every four (4) hours as needed. butalbital-acetaminophen-caff -40 mg per capsule   Commonly known as:  FIORICET   TAKE 1 TO 2 CAPSULE BY MOUTH EVERY 8 HOURS AS NEEDED FOR HEADACHES       CAMBIA 50 mg Pwpk   Generic drug:  Diclofenac Potassium   MIX AND DRINK 1 PACKET AT ONSET OF HEADACHE       cyclobenzaprine 5 mg tablet   Commonly known as:  FLEXERIL   Take 5 mg by mouth. methocarbamol 500 mg tablet   Commonly known as:  ROBAXIN   Take 1 Tab by mouth every six (6) hours as needed. pravastatin 20 mg tablet   Commonly known as:  PRAVACHOL   TAKE 1 TABLET BY MOUTH EVERY NIGHT       temazepam 30 mg capsule   Commonly known as:  RESTORIL   TAKE 1 CAPSULES BY MOUTH EVERY NIGHT AT BEDTIME AS NEEDED FOR SLEEP       trimethoprim-sulfamethoxazole 160-800 mg per tablet   Commonly known as:  BACTRIM DS, SEPTRA DS   Take 1 Tab by mouth two (2) times a day for 7 days. Prescriptions Sent to Pharmacy        Refills    trimethoprim-sulfamethoxazole (BACTRIM DS, SEPTRA DS) 160-800 mg per tablet 0    Sig: Take 1 Tab by mouth two (2) times a day for 7 days.     Class: Normal    Pharmacy: [id number] at [address] [place] [address] Ph #: [phone]    Route: Oral      We Performed the Following     AMB POC URINALYSIS DIP STICK AUTO W/O MICRO [id number] CPT(R)]       Introducing [employer]! Dear Kendell Avila: Thank you for requesting a Gametime account. Our records indicate that you already have an active Gametime account. You can access your account anytime at [email or web address]     Did you know that you can access your hospital and ER discharge instructions at any time in Gametime? You can also review all of your test results from your hospital stay or ER visit. Additional Information    If you have questions, please visit the Frequently Asked Questions section of the Gametime website at [email or web address] Remember, Gametime is NOT to be used for urgent needs. For medical emergencies, dial 911. Now available from your iPhone and Android! Please provide this summary of care documentation to your next provider. Your primary care clinician is listed as Ayaka Pearson. If you have any questions after today's visit, please call [phone].

## 2018-04-16 NOTE — PROGRESS NOTES
Patient is a 56 y.o. female presenting with frequency. The history is provided by the patient. Urinary Frequency   This is a new problem. The current episode started yesterday. The problem occurs constantly. The problem has not changed since onset. Pertinent negatives include no chest pain, no abdominal pain and no headaches. Nothing aggravates the symptoms. Nothing relieves the symptoms. She has tried nothing for the symptoms. Past Medical History:   Diagnosis Date    Allergic rhinitis 6/14/2013    Asthma     Chronic insomnia 3/28/2017    GERD (gastroesophageal reflux disease)     Headache     Headache(784.0)     Hypercholesteremia 1/23/2012    Non morbid obesity 3/28/2017    Snoring     Vitamin D deficiency 10/20/2015        Past Surgical History:   Procedure Laterality Date    ENDOSCOPY, COLON, DIAGNOSTIC  12/03 & 04/09    HX GYN      Laproscopy, BTL,TVH    HX GYN      hysterectomy         Family History   Problem Relation Age of Onset    Hypertension Father     Kidney Disease Maternal Grandmother     Hypertension Maternal Grandmother     Heart Attack Paternal Grandfather     Hypertension Mother     Cancer Maternal Grandfather      lung        Social History     Social History    Marital status:      Spouse name: N/A    Number of children: N/A    Years of education: N/A     Occupational History    Not on file. Social History Main Topics    Smoking status: Never Smoker    Smokeless tobacco: Never Used    Alcohol use Yes      Comment: Occasionally    Drug use: No    Sexual activity: Yes     Partners: Male     Birth control/ protection: None     Other Topics Concern    Not on file     Social History Narrative                ALLERGIES: Latex and Aspirin    Review of Systems   Constitutional: Negative for chills. Cardiovascular: Negative for chest pain. Gastrointestinal: Negative for abdominal pain. Genitourinary: Positive for dysuria and frequency. Neurological: Negative for headaches. Vitals:    04/16/18 1037   BP: 130/78   Pulse: 76   Resp: 18   Temp: 97.1 °F (36.2 °C)   SpO2: 98%   Weight: 211 lb 9.6 oz (96 kg)   Height: 5' 5\" (1.651 m)       Physical Exam   Constitutional: She is oriented to person, place, and time. She appears well-developed and well-nourished. Eyes: Conjunctivae and EOM are normal.   Cardiovascular: Normal rate and regular rhythm. Pulmonary/Chest: Effort normal and breath sounds normal.   Abdominal: Soft. Bowel sounds are normal. She exhibits no distension. There is no tenderness. There is no rebound. Neurological: She is alert and oriented to person, place, and time. Skin: Skin is warm and dry. Psychiatric: She has a normal mood and affect. Her behavior is normal. Judgment and thought content normal.   Nursing note and vitals reviewed. MDM     Differential Diagnosis; Clinical Impression; Plan:     CLINICAL IMPRESSION:  Urinary frequency  (primary encounter diagnosis)    Plan:  1. Bactrim  2.   3.   Amount and/or Complexity of Data Reviewed:   Clinical lab tests:  Ordered and reviewed  Risk of Significant Complications, Morbidity, and/or Mortality:   Presenting problems: Moderate  Diagnostic procedures: Moderate  Management options: Moderate  Progress:   Patient progress:  Stable      Procedures        ICD-10-CM ICD-9-CM    1. Urinary frequency R35.0 788.41 AMB POC URINALYSIS DIP STICK AUTO W/O MICRO     Medications Ordered Today   Medications    trimethoprim-sulfamethoxazole (BACTRIM DS, SEPTRA DS) 160-800 mg per tablet     Sig: Take 1 Tab by mouth two (2) times a day for 7 days. Dispense:  14 Tab     Refill:  0     The patients condition was discussed with the patient and they understand. The patient is to follow up with primary care doctor ,If signs and symptoms become worse the pt is to go to the ER. The patient is to take medications as prescribed.

## 2018-04-27 ENCOUNTER — TELEPHONE (OUTPATIENT)
Dept: FAMILY MEDICINE CLINIC | Age: 48
End: 2018-04-27

## 2018-04-27 RX ORDER — FLUCONAZOLE 150 MG/1
150 TABLET ORAL DAILY
Qty: 1 TAB | Refills: 1 | Status: SHIPPED | OUTPATIENT
Start: 2018-04-27 | End: 2018-05-01

## 2018-04-27 RX ORDER — FLUCONAZOLE 150 MG/1
150 TABLET ORAL DAILY
Qty: 1 TAB | Refills: 0 | Status: SHIPPED | OUTPATIENT
Start: 2018-04-27 | End: 2018-04-28

## 2018-04-27 NOTE — TELEPHONE ENCOUNTER
Pt called stating she was treated with Bactrim for UTI at [facility] urgent care but now has yeast infection, has been using over the counter creams but is still having a lot vaginal itching and white discharge. Pt asking if Dr. [place] can please send Beccalucan to [address]  Ldm

## 2018-05-01 ENCOUNTER — OFFICE VISIT (OUTPATIENT)
Dept: FAMILY MEDICINE CLINIC | Age: 48
End: 2018-05-01

## 2018-05-01 VITALS
DIASTOLIC BLOOD PRESSURE: 77 MMHG | RESPIRATION RATE: 18 BRPM | SYSTOLIC BLOOD PRESSURE: 124 MMHG | HEIGHT: 65 IN | BODY MASS INDEX: 35.32 KG/M2 | HEART RATE: 77 BPM | WEIGHT: 212 LBS | TEMPERATURE: 97.9 F

## 2018-05-01 DIAGNOSIS — G47.00 INSOMNIA, UNSPECIFIED TYPE: ICD-10-CM

## 2018-05-01 DIAGNOSIS — R20.0 NUMBNESS AND TINGLING OF BOTH LEGS: ICD-10-CM

## 2018-05-01 DIAGNOSIS — J45.20 MILD INTERMITTENT ASTHMA WITHOUT COMPLICATION: ICD-10-CM

## 2018-05-01 DIAGNOSIS — R41.3 MEMORY LOSS: Primary | ICD-10-CM

## 2018-05-01 DIAGNOSIS — R20.0 NUMBNESS AND TINGLING IN BOTH HANDS: ICD-10-CM

## 2018-05-01 DIAGNOSIS — R94.2 ABNORMAL PFT: ICD-10-CM

## 2018-05-01 DIAGNOSIS — R20.2 NUMBNESS AND TINGLING IN BOTH HANDS: ICD-10-CM

## 2018-05-01 DIAGNOSIS — R06.02 SOB (SHORTNESS OF BREATH): ICD-10-CM

## 2018-05-01 DIAGNOSIS — R53.83 FATIGUE, UNSPECIFIED TYPE: ICD-10-CM

## 2018-05-01 DIAGNOSIS — R20.2 NUMBNESS AND TINGLING OF BOTH LEGS: ICD-10-CM

## 2018-05-01 RX ORDER — TRAZODONE HYDROCHLORIDE 50 MG/1
50 TABLET ORAL
Qty: 30 TAB | Refills: 0 | Status: SHIPPED | OUTPATIENT
Start: 2018-05-01 | End: 2018-06-20 | Stop reason: SDUPTHER

## 2018-05-01 RX ORDER — TOPIRAMATE 25 MG/1
TABLET ORAL
Refills: 0 | COMMUNITY
Start: 2018-02-19 | End: 2018-06-11 | Stop reason: DRUGHIGH

## 2018-05-01 RX ORDER — HYDROCODONE BITARTRATE AND ACETAMINOPHEN 7.5; 325 MG/1; MG/1
TABLET ORAL
Refills: 0 | COMMUNITY
Start: 2018-01-23 | End: 2018-06-11

## 2018-05-01 RX ORDER — ZOLMITRIPTAN 5 MG/1
TABLET, FILM COATED ORAL
Refills: 1 | COMMUNITY
Start: 2018-02-19 | End: 2019-01-02

## 2018-05-01 RX ORDER — PROPRANOLOL HYDROCHLORIDE 10 MG/1
TABLET ORAL
Refills: 0 | COMMUNITY
Start: 2018-01-22 | End: 2019-01-02 | Stop reason: SDUPTHER

## 2018-05-01 NOTE — MR AVS SNAPSHOT
[address] [facility]  [phone]     Patient: Nickie Romano  MRN: [id number]  :1970               Visit Information     Date & Time Provider Department Dept. Phone Encounter #    2018 11:15 AM Muriel Rand MD [address] [id number]      Your Appointments     2018 11:40 AM   Follow Up with Connor Morales MD   [facility])   Appt Note: follow up: J[address],  [address] [facility], [address]  [phone]           [address], [id number], [address] 400 [facility] Maintenance        Date Due    PAP AKA CERVICAL CYTOLOGY 10/9/1992    Influenza Age 9 to Adult 2018    DTaP/Tdap/Td series (2 - Td) 3/28/2027      Allergies as of 2018  Review Complete On: 2018 By: Muriel Rand MD       Severity Noted Reaction Type Reactions    Latex Medium 2013   Topical Rash    Aspirin  2017    Other (comments)    \"It doesn't digest in my system\"      Current Immunizations  Reviewed on 3/28/2017    No immunizations on file.        Not reviewed this visit   You Were Diagnosed With        Codes Comments    Memory loss    -  Primary ICD-10-CM: R41.3  ICD-9-CM: 780.93     Numbness and tingling in both hands     ICD-10-CM: R20.0, R20.2  ICD-9-CM: 782.0     Numbness and tingling of both legs     ICD-10-CM: R20.0, R20.2  ICD-9-CM: 782.0     Mild intermittent asthma without complication     QFL-90-AQ: J45.20  ICD-9-CM: 493.90     Abnormal PFT     ICD-10-CM: R94.2  ICD-9-CM: 794.2     SOB (shortness of breath)     ICD-10-CM: R06.02  ICD-9-CM: 786.05     Fatigue, unspecified type     ICD-10-CM: R53.83  ICD-9-CM: 780.79     Insomnia, unspecified type     ICD-10-CM: G47.00  ICD-9-CM: 780.52       Vitals     BP Pulse Temp Resp Height(growth percentile) Weight(growth percentile) 124/77 77 97.9 °F (36.6 °C) (Oral) 18 5' 5\" (1.651 m) 212 lb (96.2 kg)    BMI OB Status Smoking Status             35.28 kg/m2 Hysterectomy Never Smoker       Vitals History      BMI and BSA Data     Body Mass Index Body Surface Area    35.28 kg/m 2 2.1 m 2         Preferred Pharmacy       Pharmacy Name Phone    [facility], [facility] [phone]         Your Updated Medication List          This list is accurate as of 5/1/18 12:46 PM.  Always use your most recent med list.                albuterol 90 mcg/actuation inhaler   Commonly known as:  PROVENTIL HFA, VENTOLIN HFA, PROAIR HFA   Take 2 Puffs by inhalation every four (4) hours as needed for Wheezing or Shortness of Breath. albuterol-ipratropium 2.5 mg-0.5 mg/3 ml Nebu   Commonly known as:  DUO-NEB   3 mL by Nebulization route every four (4) hours as needed. butalbital-acetaminophen-caff -40 mg per capsule   Commonly known as:  FIORICET   TAKE 1 TO 2 CAPSULE BY MOUTH EVERY 8 HOURS AS NEEDED FOR HEADACHES       CAMBIA 50 mg Pwpk   Generic drug:  Diclofenac Potassium   MIX AND DRINK 1 PACKET AT ONSET OF HEADACHE       cyclobenzaprine 5 mg tablet   Commonly known as:  FLEXERIL   Take 5 mg by mouth. HYDROcodone-acetaminophen 7.5-325 mg per tablet   Commonly known as:  NORCO   TK 1 T PO  BID PRF 30 DAYS       pravastatin 20 mg tablet   Commonly known as:  PRAVACHOL   TAKE 1 TABLET BY MOUTH EVERY NIGHT       propranolol 10 mg tablet   Commonly known as:  INDERAL       temazepam 30 mg capsule   Commonly known as:  RESTORIL   TAKE 1 CAPSULES BY MOUTH EVERY NIGHT AT BEDTIME AS NEEDED FOR SLEEP       topiramate 25 mg tablet   Commonly known as:  TOPAMAX       traZODone 50 mg tablet   Commonly known as:  DESYREL   Take 1 Tab by mouth nightly. ZOLMitriptan 5 mg tablet   Commonly known as:  ZOMIG   TK 1 T PO 1 TIME PRF MIGRAINE HEADACHE.  MAY REPEAT DOSE 1 TIME IN 2 H               Prescriptions Sent to Pharmacy        Refills    traZODone (DESYREL) 50 mg tablet 0    Sig: Take 1 Tab by mouth nightly. Class: Normal    Pharmacy: [facility] [address], [address] [facility] NEYMAR Carrasco  #: [phone]    Route: Oral      We Performed the Following     REFERRAL TO NEUROLOGY [JYR43 Custom]     Comments:    Memory loss, numbness in arms and legs    REFERRAL TO PULMONARY DISEASE [KKS82 Custom]     Comments:    Asthma, shortness of breath, abnormal pulmonary function tests      Referral Information     Referral ID Referred By Referred To       [id number] RANDY, [address] [facility], [address] [facility]       Phone: [phone]       Fax: [phone]         Visits Status Start Date End Date    1 New Request 5/1/18 5/1/19    If your referral has a status of pending review or denied, additional information will be sent to support the outcome of this decision. Referral ID Referred By Referred To    [id number] [facility] 71      [address] [id number] [place] [address], [facility]    Visits Status Start Date End Date    1 New Request 5/1/18 5/1/19    If your referral has a status of pending review or denied, additional information will be sent to support the outcome of this decision. Introducing [address]! Dear Teetee Humphreys: Thank you for requesting a CarFin account. Our records indicate that you already have an active CarFin account. You can access your account anytime at [email or web address]     Did you know that you can access your hospital and ER discharge instructions at any time in CarFin? You can also review all of your test results from your hospital stay or ER visit.     Additional Information    If you have questions, please visit the Frequently Asked Questions section of the App[employer]ot website at [email or web address] Remember, [employer] is NOT to be used for urgent needs. For medical emergencies, dial 911. Now available from your iPhone and Android! Please provide this summary of care documentation to your next provider. Your primary care clinician is listed as Jarad Hager. If you have any questions after today's visit, please call [phone].

## 2018-05-01 NOTE — PROGRESS NOTES
HISTORY OF PRESENT ILLNESS  Zeny Eaton is a 56 y.o. female. HPI Comments: Zeny Eaton is here to follow up on her memory loss, and to review her labs plus pulmonary function tests. She has asthma. Her labs were all normal except for mildly elevated platelets, which have been chronic. Her pulmonary function tests read:    I have reviewed pulmonary function tests from the patient. Spirometry reveals no evidence of an obstructive lung defect. There is not significant reversibility with administration of bronchodilators. Lung volumes are within normal limits. DLCO is mildly reduced with correction when taking into account alveolar volume. She uses her albuterol prior to exercise with good relief. As far as memory loss is concerned, she forgets short term things, such as forgetting her blood pressure was taken at a doctor, repeating herself and forgetting recent conversations. This started after an LP. Neurology told her it due to her headaches, which have improved, but the memory has not. Also, she mentions her arms and legs go numb. She has been getting injections for her back pain. Review of Systems   Constitutional: Positive for malaise/fatigue. Negative for weight loss. No weight gain   Respiratory: Positive for shortness of breath. Negative for cough and wheezing. Cardiovascular: Negative for chest pain and palpitations. Psychiatric/Behavioral: Positive for memory loss. Negative for depression and substance abuse. The patient has insomnia. The patient is not nervous/anxious. Visit Vitals    /77    Pulse 77    Temp 97.9 °F (36.6 °C) (Oral)    Resp 18    Ht 5' 5\" (1.651 m)    Wt 212 lb (96.2 kg)    BMI 35.28 kg/m2     Physical Exam   Constitutional: She is oriented to person, place, and time. She appears well-developed and well-nourished. No distress. Eyes: Conjunctivae and EOM are normal. Pupils are equal, round, and reactive to light. Fundoscopic exam:       The right eye shows no papilledema. The left eye shows no papilledema. Cardiovascular: Normal rate, regular rhythm and normal heart sounds. Exam reveals no gallop and no friction rub. No murmur heard. Pulmonary/Chest: Effort normal and breath sounds normal. No respiratory distress. She has no wheezes. She has no rales. Musculoskeletal: She exhibits no edema. Neurological: She is alert and oriented to person, place, and time. She has normal strength. No cranial nerve deficit or sensory deficit. Coordination and gait normal. She displays no Babinski's sign on the right side. She displays no Babinski's sign on the left side. Reflex Scores:       Tricep reflexes are 2+ on the right side and 2+ on the left side. Bicep reflexes are 2+ on the right side and 2+ on the left side. Brachioradialis reflexes are 2+ on the right side and 2+ on the left side. Patellar reflexes are 2+ on the right side and 2+ on the left side. Achilles reflexes are 2+ on the right side and 2+ on the left side. Skin: Skin is warm and dry. She is not diaphoretic. Psychiatric: She has a normal mood and affect. Nursing note and vitals reviewed. Mini Mental Status - 26.5    ASSESSMENT and PLAN    ICD-10-CM ICD-9-CM    1. Memory loss R41.3 780.93 REFERRAL TO NEUROLOGY   2. Numbness and tingling in both hands R20.0 782.0 REFERRAL TO NEUROLOGY    R20.2     3. Numbness and tingling of both legs R20.0 782.0 REFERRAL TO NEUROLOGY    R20.2     4. Mild intermittent asthma without complication I83.60 368.64 REFERRAL TO PULMONARY DISEASE   5. Abnormal PFT R94.2 794.2 REFERRAL TO PULMONARY DISEASE   6. SOB (shortness of breath) R06.02 786.05 REFERRAL TO PULMONARY DISEASE   7. Fatigue, unspecified type R53.83 780.79    8.  Insomnia, unspecified type G47.00 780.52 traZODone (DESYREL) 50 mg tablet        Memory loss, patient feels it is not related to her headaches  Paresthesias of the extremities  Symptomatic asthma with abnormal pulmonary function tests  Refer back to neurology  Pulmonary referral  Refill trazodone    Follow-up Disposition:  Return if symptoms worsen or fail to improve. Reviewed plan of care. Patient has provided input and agrees with goals.

## 2018-05-01 NOTE — PROGRESS NOTES
Chief Complaint   Patient presents with    Memory Loss     review labs and pfts; 1 mo f/u     1. Have you been to the ER, urgent care clinic since your last visit? Yes Urgent Care 04/2018 urinary pain Hospitalized since your last visit? No     2. Have you seen or consulted any other health care providers outside of the [address] [place] [address] since your last visit? Include any pap smears or colon screening.  No

## 2018-05-09 ENCOUNTER — TELEPHONE (OUTPATIENT)
Dept: FAMILY MEDICINE CLINIC | Age: 48
End: 2018-05-09

## 2018-05-09 NOTE — TELEPHONE ENCOUNTER
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 Francia Cameron and now needs forms completed to confirm reason she takes the medicine before she can return to work. Pt requests call back to advise if she needs appointment for this or if she can just drop off forms.   Letty

## 2018-05-15 ENCOUNTER — OFFICE VISIT (OUTPATIENT)
Dept: FAMILY MEDICINE CLINIC | Age: 48
End: 2018-05-15

## 2018-05-15 ENCOUNTER — DOCUMENTATION ONLY (OUTPATIENT)
Dept: FAMILY MEDICINE CLINIC | Age: 48
End: 2018-05-15

## 2018-05-15 ENCOUNTER — TELEPHONE (OUTPATIENT)
Dept: FAMILY MEDICINE CLINIC | Age: 48
End: 2018-05-15

## 2018-05-15 VITALS
HEART RATE: 89 BPM | BODY MASS INDEX: 35.32 KG/M2 | TEMPERATURE: 98.6 F | WEIGHT: 212 LBS | SYSTOLIC BLOOD PRESSURE: 114 MMHG | RESPIRATION RATE: 18 BRPM | DIASTOLIC BLOOD PRESSURE: 66 MMHG | HEIGHT: 65 IN

## 2018-05-15 DIAGNOSIS — G43.909 MIGRAINE WITHOUT STATUS MIGRAINOSUS, NOT INTRACTABLE, UNSPECIFIED MIGRAINE TYPE: ICD-10-CM

## 2018-05-15 DIAGNOSIS — E78.00 HYPERCHOLESTEREMIA: ICD-10-CM

## 2018-05-15 DIAGNOSIS — F51.04 CHRONIC INSOMNIA: Primary | ICD-10-CM

## 2018-05-15 RX ORDER — PRAVASTATIN SODIUM 20 MG/1
TABLET ORAL
Qty: 30 TAB | Refills: 9 | Status: SHIPPED | OUTPATIENT
Start: 2018-05-15 | End: 2019-01-02

## 2018-05-15 NOTE — MR AVS SNAPSHOT
[address] [phone]     Patient: Ruthy Kirk  MRN: [id number]  :1970               Visit Information     Date & Time Provider Department Dept. Phone Encounter #    5/15/2018  9:30 AM Chirag West MD [facility] 34 [id number]      Follow-up Instructions     Return if symptoms worsen or fail to improve. Your Appointments     2018 11:40 AM   Follow Up with Delmer Vila MD   Neurology [facility] at [facility] [address] [facility])   Appt Note: follow up: [facility] 57,  [address] [place] [address], [address]  [address] [phone]           [address] [facility], [address] [place] [address], [address] [facility] [address] 400 [facility] Maintenance        Date Due    PAP AKA CERVICAL CYTOLOGY 10/9/1992    Influenza Age 9 to Adult 2018    DTaP/Tdap/Td series (2 - Td) 3/28/2027      Allergies as of 5/15/2018  Review Complete On: 5/15/2018 By: Chirag West MD       Severity Noted Reaction Type Reactions    Latex Medium 2013   Topical Rash    Aspirin  2017    Other (comments)    \"It doesn't digest in my system\"      Current Immunizations  Reviewed on 3/28/2017    No immunizations on file.        Not reviewed this visit   You Were Diagnosed With        Codes Comments    Migraine without status migrainosus, not intractable, unspecified migraine type    -  Primary ICD-10-CM: G43.909  ICD-9-CM: 346.90     Chronic insomnia     ICD-10-CM: F51.04  ICD-9-CM: 780.52     Hypercholesteremia     ICD-10-CM: E78.00  ICD-9-CM: 272.0       Vitals     BP Pulse Temp Resp Height(growth percentile) Weight(growth percentile)    114/66 89 98.6 °F (37 °C) (Oral) 18 5' 5\" (1.651 m) 212 lb (96.2 kg)    BMI OB Status Smoking Status             35.28 kg/m2 Hysterectomy Never Smoker       Vitals History      BMI and BSA Data     Body Mass Index Body Surface Area    35.28 kg/m 2 2.1 m 2         Preferred Pharmacy       Pharmacy Name Phone    [facility], [address] [facility] [address] [phone]         Your Updated Medication List          This list is accurate as of 5/15/18 11:47 AM.  Always use your most recent med list.                albuterol 90 mcg/actuation inhaler   Commonly known as:  PROVENTIL HFA, VENTOLIN HFA, PROAIR HFA   Take 2 Puffs by inhalation every four (4) hours as needed for Wheezing or Shortness of Breath. albuterol-ipratropium 2.5 mg-0.5 mg/3 ml Nebu   Commonly known as:  DUO-NEB   3 mL by Nebulization route every four (4) hours as needed. butalbital-acetaminophen-caff -40 mg per capsule   Commonly known as:  FIORICET   TAKE 1 TO 2 CAPSULE BY MOUTH EVERY 8 HOURS AS NEEDED FOR HEADACHES       CAMBIA 50 mg Pwpk   Generic drug:  Diclofenac Potassium   MIX AND DRINK 1 PACKET AT ONSET OF HEADACHE       cyclobenzaprine 5 mg tablet   Commonly known as:  FLEXERIL   Take 5 mg by mouth. HYDROcodone-acetaminophen 7.5-325 mg per tablet   Commonly known as:  NORCO   TK 1 T PO  BID PRF 30 DAYS       pravastatin 20 mg tablet   Commonly known as:  PRAVACHOL   TAKE 1 TABLET BY MOUTH EVERY NIGHT       propranolol 10 mg tablet   Commonly known as:  INDERAL       topiramate 25 mg tablet   Commonly known as:  TOPAMAX       traZODone 50 mg tablet   Commonly known as:  DESYREL   Take 1 Tab by mouth nightly. ZOLMitriptan 5 mg tablet   Commonly known as:  ZOMIG   TK 1 T PO 1 TIME PRF MIGRAINE HEADACHE.  MAY REPEAT DOSE 1 TIME IN 2 H               Prescriptions Sent to Pharmacy        Refills    pravastatin (PRAVACHOL) 20 mg tablet 9    Sig: TAKE 1 TABLET BY MOUTH EVERY NIGHT    Class: Normal    [facility], [address] [facility] [address] Ph #: [phone]      We Performed the Following     HEPATIC FUNCTION PANEL [10049 CPT(R)]     LIPID PANEL [27652 CPT(R)]       Follow-up Instructions     Return if symptoms worsen or fail to improve. Introducing [facility] SERVICES! Dear Demar Moody: Thank you for requesting a Syntervention account. Our records indicate that you already have an active Syntervention account. You can access your account anytime at [email or web address]     Did you know that you can access your hospital and ER discharge instructions at any time in Syntervention? You can also review all of your test results from your hospital stay or ER visit. Additional Information    If you have questions, please visit the Frequently Asked Questions section of the Syntervention website at [email or web address] Remember, Syntervention is NOT to be used for urgent needs. For medical emergencies, dial 911. Now available from your iPhone and Android! Please provide this summary of care documentation to your next provider. Your primary care clinician is listed as Ila Cordova. If you have any questions after today's visit, please call [phone].

## 2018-05-15 NOTE — PROGRESS NOTES
PATIENT SEEN TODAY. RETURN TO WORK NOTE PRINTED, BUT, AFTER FURTHER CHART REVIEW, I DETERMINED IS WAS NOT NECESSARY, SO IT WAS SHREDDED.

## 2018-05-15 NOTE — PROGRESS NOTES
Chief Complaint   Patient presents with    Medication Evaluation     and paperwork     1. Have you been to the ER, urgent care clinic since your last visit? No  Hospitalized since your last visit? No     2. Have you seen or consulted any other health care providers outside of the [address] [place] [address] since your last visit? Include any pap smears or colon screening.  No

## 2018-05-15 NOTE — PROGRESS NOTES
Subjective:  Zeny Eaton is 56y.o. year old female who presents today for completion of a form for her working as a  for [employer]. Evidently, she is taking Restoril and trazodone, for insomnia, and they are questioning this. She tells me she stopped the Restoril when she started trazodone earlier this month. She also takes prn hydrocodone for her back pain, but she hasn't taken it since the end of March. Also, she takes Fioricet prn for her migraines. She has only taken 4 tablets in March. Patient Active Problem List   Diagnosis Code    Allergy T78.40XA    Mild intermittent asthma without complication L90.40    Migraines G43.909    GERD (gastroesophageal reflux disease) K21.9    Headache(784.0) R51    Cerebral vascular malformation Q28.3    Hypercholesteremia E78.00    Allergic rhinitis J30.9    Vitamin D deficiency E55.9    Chronic insomnia F51.04    Non morbid obesity E66.9    Severe obesity (BMI 35.0-39. 9) with comorbidity ([facility] [address] E66.01     Past Medical History:   Diagnosis Date    Allergic rhinitis 6/14/2013    Asthma     Chronic insomnia 3/28/2017    GERD (gastroesophageal reflux disease)     Headache     Headache(784.0)     Hypercholesteremia 1/23/2012    Non morbid obesity 3/28/2017    Snoring     Vitamin D deficiency 10/20/2015     Past Surgical History:   Procedure Laterality Date    ENDOSCOPY, COLON, DIAGNOSTIC  12/03 & 04/09    HX GYN      Laproscopy, BTL,TVH    HX GYN      hysterectomy      Family History   Problem Relation Age of Onset    Hypertension Father     Kidney Disease Maternal Grandmother     Hypertension Maternal Grandmother     Heart Attack Paternal Grandfather     Hypertension Mother     Cancer Maternal Grandfather      lung     Social History   Substance Use Topics    Smoking status: Never Smoker    Smokeless tobacco: Never Used    Alcohol use Yes      Comment: Occasionally     Allergies   Allergen Reactions    Latex Rash    Aspirin Other (comments)     \"It doesn't digest in my system\"     Current Outpatient Prescriptions   Medication Sig Dispense Refill    propranolol (INDERAL) 10 mg tablet   0    topiramate (TOPAMAX) 25 mg tablet   0    ZOLMitriptan (ZOMIG) 5 mg tablet TK 1 T PO 1 TIME PRF MIGRAINE HEADACHE. MAY REPEAT DOSE 1 TIME IN 2 H  1    HYDROcodone-acetaminophen (NORCO) 7.5-325 mg per tablet TK 1 T PO  BID PRF 30 DAYS  0    traZODone (DESYREL) 50 mg tablet Take 1 Tab by mouth nightly. 30 Tab 0    cyclobenzaprine (FLEXERIL) 5 mg tablet Take 5 mg by mouth.  albuterol (PROVENTIL HFA, VENTOLIN HFA, PROAIR HFA) 90 mcg/actuation inhaler Take 2 Puffs by inhalation every four (4) hours as needed for Wheezing or Shortness of Breath. 1 Inhaler 0    albuterol-ipratropium (DUO-NEB) 2.5 mg-0.5 mg/3 ml nebu 3 mL by Nebulization route every four (4) hours as needed. 30 Nebule 0    CAMBIA 50 mg pwpk MIX AND DRINK 1 PACKET AT ONSET OF HEADACHE 9 Packet 3    pravastatin (PRAVACHOL) 20 mg tablet TAKE 1 TABLET BY MOUTH EVERY NIGHT 30 Tab 9    butalbital-acetaminophen-caff (FIORICET) -40 mg per capsule TAKE 1 TO 2 CAPSULE BY MOUTH EVERY 8 HOURS AS NEEDED FOR HEADACHES 40 Cap 0        Objective:  Visit Vitals    /66    Pulse 89    Temp 98.6 °F (37 °C) (Oral)    Resp 18    Ht 5' 5\" (1.651 m)    Wt 212 lb (96.2 kg)    BMI 35.28 kg/m2     General appearance - alert, well appearing, and in no distress  Mental status - alert, oriented to person, place, and time, normal mood, behavior, speech, dress, motor activity, and thought processes    Assessment/Plan:    ICD-10-CM ICD-9-CM    1. Chronic insomnia F51.04 780.52    2. Migraine without status migrainosus, not intractable, unspecified migraine type G43.909 346.90    3.  Hypercholesteremia E78.00 272.0 pravastatin (PRAVACHOL) 20 mg tablet      HEPATIC FUNCTION PANEL      LIPID PANEL       Will complete form documenting she is no longer taking Restoril  Labs per orders. Refills per orders    Follow-up Disposition:  Return if symptoms worsen or fail to improve. Reviewed plan of care. Patient has provided input and agrees with goals.

## 2018-05-15 NOTE — LETTER
NOTIFICATION RETURN TO WORK / SCHOOL    5/15/2018 10:29 AM    Ms. Sammy Argueta GusChristen  [address]  [address] [facility] [phone]      To Whom It May Concern:    Leslie Davis is currently under the care of [address] [place] [address]. She will return to work/school on: 6/2/18 pending neurology clearance. If there are questions or concerns please have the patient contact our office.         Sincerely,      Chiquita Nguyen MD

## 2018-06-11 ENCOUNTER — OFFICE VISIT (OUTPATIENT)
Dept: NEUROLOGY | Age: 48
End: 2018-06-11

## 2018-06-11 VITALS
BODY MASS INDEX: 34.75 KG/M2 | DIASTOLIC BLOOD PRESSURE: 80 MMHG | SYSTOLIC BLOOD PRESSURE: 122 MMHG | HEART RATE: 70 BPM | WEIGHT: 208.6 LBS | HEIGHT: 65 IN | OXYGEN SATURATION: 98 %

## 2018-06-11 DIAGNOSIS — G44.229 CHRONIC TENSION-TYPE HEADACHE, NOT INTRACTABLE: ICD-10-CM

## 2018-06-11 DIAGNOSIS — E78.2 MIXED HYPERLIPIDEMIA: ICD-10-CM

## 2018-06-11 DIAGNOSIS — G43.719 CHRONIC MIGRAINE WITHOUT AURA, INTRACTABLE, WITHOUT STATUS MIGRAINOSUS: Primary | ICD-10-CM

## 2018-06-11 DIAGNOSIS — G25.0 ESSENTIAL TREMOR: ICD-10-CM

## 2018-06-11 RX ORDER — BUTALBITAL, ACETAMINOPHEN AND CAFFEINE 300; 40; 50 MG/1; MG/1; MG/1
1 CAPSULE ORAL
Qty: 60 CAP | Refills: 0 | Status: SHIPPED | OUTPATIENT
Start: 2018-06-11 | End: 2019-05-31 | Stop reason: SDUPTHER

## 2018-06-11 RX ORDER — TOPIRAMATE 50 MG/1
50 TABLET, FILM COATED ORAL 2 TIMES DAILY
Qty: 60 TAB | Refills: 5 | Status: SHIPPED | OUTPATIENT
Start: 2018-06-11 | End: 2019-01-02 | Stop reason: DRUGHIGH

## 2018-06-11 NOTE — MR AVS SNAPSHOT
[address],  [address], [address] [facility] 83.  [phone]     Patient: Cindy Sparks  MRN: [id number]  :1970               Visit Information     Date & Time Provider Department Dept. Phone Encounter #    2018  3:40 PM Pramod Yee MD [facility] [phone] [id number]      Follow-up Instructions     Return in about 6 months (around 2018) for headache. Upcoming Health Maintenance        Date Due    PAP AKA CERVICAL CYTOLOGY 10/9/1992    Influenza Age 9 to Adult 2018    DTaP/Tdap/Td series (2 - Td) 3/28/2027      Allergies as of 2018  Review Complete On: 2018 By: Maria L Raymond       Severity Noted Reaction Type Reactions    Latex Medium 2013   Topical Rash    Aspirin  2017    Other (comments)    \"It doesn't digest in my system\"      Current Immunizations  Reviewed on 3/28/2017    No immunizations on file.        Not reviewed this visit   You Were Diagnosed With        Codes Comments    Chronic migraine without aura, intractable, without status migrainosus    -  Primary ICD-10-CM: G43.719  ICD-9-CM: 346.71     Chronic tension-type headache, not intractable     ICD-10-CM: F92.708  ICD-9-CM: 339.12     Mixed hyperlipidemia     ICD-10-CM: E78.2  ICD-9-CM: 272.2     Essential tremor     ICD-10-CM: G25.0  ICD-9-CM: 333.1       Vitals     BP Pulse Height(growth percentile) Weight(growth percentile) SpO2 BMI    122/80 70 5' 5\" (1.651 m) 208 lb 9.6 oz (94.6 kg) 98% 34.71 kg/m2    OB Status Smoking Status                Hysterectomy Never Smoker          BMI and BSA Data     Body Mass Index Body Surface Area    34.71 kg/m 2 2.08 m 2         Preferred Pharmacy       Pharmacy Name Phone    [facility] [address] [facility] [phone]         Your Updated Medication List          This list is accurate as of 6/11/18  4:35 PM.  Always use your most recent med list.                albuterol 90 mcg/actuation inhaler   Commonly known as:  PROVENTIL HFA, VENTOLIN HFA, PROAIR HFA   Take 2 Puffs by inhalation every four (4) hours as needed for Wheezing or Shortness of Breath. albuterol-ipratropium 2.5 mg-0.5 mg/3 ml Nebu   Commonly known as:  DUO-NEB   3 mL by Nebulization route every four (4) hours as needed. butalbital-acetaminophen-caff -40 mg per capsule   Commonly known as:  FIORICET   Take 1 Cap by mouth every six (6) hours as needed for Headache. cyclobenzaprine 5 mg tablet   Commonly known as:  FLEXERIL   Take 5 mg by mouth.       pravastatin 20 mg tablet   Commonly known as:  PRAVACHOL   TAKE 1 TABLET BY MOUTH EVERY NIGHT       propranolol 10 mg tablet   Commonly known as:  INDERAL       topiramate 50 mg tablet   Commonly known as:  TOPAMAX   Take 1 Tab by mouth two (2) times a day. traZODone 50 mg tablet   Commonly known as:  DESYREL   Take 1 Tab by mouth nightly. ZOLMitriptan 5 mg tablet   Commonly known as:  ZOMIG   TK 1 T PO 1 TIME PRF MIGRAINE HEADACHE. MAY REPEAT DOSE 1 TIME IN 2 H               Prescriptions Sent to Pharmacy        Refills    topiramate (TOPAMAX) 50 mg tablet 5    Sig: Take 1 Tab by mouth two (2) times a day. Class: Normal    Pharmacy: [facility] [address] [place] [address] Ph #: [phone]    Route: Oral    butalbital-acetaminophen-caff (FIORICET) -40 mg per capsule 0    Sig: Take 1 Cap by mouth every six (6) hours as needed for Headache.     Class: Normal    Pharmacy: [facility], [address] [facility] Ph #: [phone]    Route: Oral      We Performed the Following     CBC WITH AUTOMATED DIFF [46454 CPT(R)]     HEPATIC FUNCTION PANEL [33398 CPT(R)]     LIPID PANEL [03365 CPT(R)] METABOLIC PANEL, COMPREHENSIVE [52286 CPT(R)]     TOPIRAMATE [29255 CPT(R)]       Follow-up Instructions     Return in about 6 months (around 12/11/2018) for headache. Patient Instructions         A Healthy Lifestyle: Care Instructions  Your Care Instructions    A healthy lifestyle can help you feel good, stay at a healthy weight, and have plenty of energy for both work and play. A healthy lifestyle is something you can share with your whole family. A healthy lifestyle also can lower your risk for serious health problems, such as high blood pressure, heart disease, and diabetes. You can follow a few steps listed below to improve your health and the health of your family. Follow-up care is a key part of your treatment and safety. Be sure to make and go to all appointments, and call your doctor if you are having problems. It's also a good idea to know your test results and keep a list of the medicines you take. How can you care for yourself at home? · Do not eat too much sugar, fat, or fast foods. You can still have dessert and treats now and then. The goal is moderation. · Start small to improve your eating habits. Pay attention to portion sizes, drink less juice and soda pop, and eat more fruits and vegetables. ¨ Eat a healthy amount of food. A 3-ounce serving of meat, for example, is about the size of a deck of cards. Fill the rest of your plate with vegetables and whole grains. ¨ Limit the amount of soda and sports drinks you have every day. Drink more water when you are thirsty. ¨ Eat at least 5 servings of fruits and vegetables every day. It may seem like a lot, but it is not hard to reach this goal. A serving or helping is 1 piece of fruit, 1 cup of vegetables, or 2 cups of leafy, raw vegetables. Have an apple or some carrot sticks as an afternoon snack instead of a candy bar. Try to have fruits and/or vegetables at every meal.  · Make exercise part of your daily routine.  You may want to start with simple activities, such as walking, bicycling, or slow swimming. Try to be active 30 to 60 minutes every day. You do not need to do all 30 to 60 minutes all at once. For example, you can exercise 3 times a day for 10 or 20 minutes. Moderate exercise is safe for most people, but it is always a good idea to talk to your doctor before starting an exercise program.  · Keep moving. Yulisa Pour the lawn, work in the garden, or [place]. Take the stairs instead of the elevator at work. · If you smoke, quit. People who smoke have an increased risk for heart attack, stroke, cancer, and other lung illnesses. Quitting is hard, but there are ways to boost your chance of quitting tobacco for good. ¨ Use nicotine gum, patches, or lozenges. ¨ Ask your doctor about stop-smoking programs and medicines. ¨ Keep trying. In addition to reducing your risk of diseases in the future, you will notice some benefits soon after you stop using tobacco. If you have shortness of breath or asthma symptoms, they will likely get better within a few weeks after you quit. · Limit how much alcohol you drink. Moderate amounts of alcohol (up to 2 drinks a day for men, 1 drink a day for women) are okay. But drinking too much can lead to liver problems, high blood pressure, and other health problems. Family health  If you have a family, there are many things you can do together to improve your health. · Eat meals together as a family as often as possible. · Eat healthy foods. This includes fruits, vegetables, lean meats and dairy, and whole grains. · Include your family in your fitness plan. Most people think of activities such as jogging or tennis as the way to fitness, but there are many ways you and your family can be more active. Anything that makes you breathe hard and gets your heart pumping is exercise. Here are some tips:  ¨ Walk to do errands or to take your child to school or the bus.   ¨ Go for a family bike ride after dinner instead of watching TV. Where can you learn more? Go to http://mario-barry.info/. Enter L525 in the search box to learn more about \"A Healthy Lifestyle: Care Instructions. \"  Current as of: May 12, 2017  Content Version: 11.4  © 9119-3571 [employer]. Care instructions adapted under license by [employer] (which disclaims liability or warranty for this information). If you have questions about a medical condition or this instruction, always ask your healthcare professional. [address] any warranty or liability for your use of this information. Please be advised there is a $25 fee for all paperwork to be completed from our  providers. This is to be paid by the patient prior to picking up the completed forms. Tension Headache: Care Instructions  Your Care Instructions  Most headaches are tension headaches. These headaches tend to happen again, especially if you are under stress. A tension headache may cause pain or a feeling of pressure all over your head. You probably can't pinpoint the center of the pain. If you keep getting tension headaches, the best thing you can do to limit them is to find out what is causing them and then make changes in those areas. Follow-up care is a key part of your treatment and safety. Be sure to make and go to all appointments, and call your doctor if you are having problems. It's also a good idea to know your test results and keep a list of the medicines you take. How can you care for yourself at home? · Rest in a quiet, dark room with a cool cloth on your forehead until your headache is gone. Close your eyes, and try to relax or go to sleep. Don't watch TV or read. Avoid using the computer. · Use a warm, moist towel or a heating pad set on low to relax tight shoulder and neck muscles. · Have someone gently massage your neck and shoulders. · Take pain medicines exactly as directed.   ¨ If the doctor gave you a prescription medicine for pain, take it as prescribed. ¨ If you are not taking a prescription pain medicine, ask your doctor if you can take an over-the-counter medicine. · Be careful not to take pain medicine more often than the instructions allow, because you may get worse or more frequent headaches when the medicine wears off. · If you get another tension headache, stop what you are doing and sit quietly for a moment. Close your eyes and breathe slowly. Try to relax your head and neck muscles. · Do not ignore new symptoms that occur with a headache, such as fever, weakness or numbness, vision changes, or confusion. These may be signs of a more serious problem. To help prevent headaches  · Keep a headache diary so you can figure out what triggers your headaches. Avoiding triggers may help you prevent headaches. Record when each headache began, how long it lasted, and what the pain was like (throbbing, aching, stabbing, or dull). List anything that may have triggered the headache, such as being physically or emotionally stressed or being anxious or depressed. Other possible triggers are hunger, anger, fatigue, poor posture, and muscle strain. · Find healthy ways to deal with stress. Headaches are most common during or right after stressful times. Take time to relax before and after you do something that has caused a headache in the past.  · Exercise daily to relieve stress. Relaxation exercises may help reduce tension. · Get plenty of sleep. · Eat regularly and well. Long periods without food can trigger a headache. · Treat yourself to a massage. Some people find that massages are very helpful in relieving tension. · Try to keep your muscles relaxed by keeping good posture. Check your jaw, face, neck, and shoulder muscles for tension, and try to relax them. When sitting at a desk, change positions often, and stretch for 30 seconds each hour.   · Reduce eyestrain from computers by blinking frequently and looking away from the computer screen every so often. Make sure you have proper eyewear and that your monitor is set up properly, about an arm's length away. When should you call for help? Call 911 anytime you think you may need emergency care. For example, call if:  ? · You have signs of a stroke. These may include:  ¨ Sudden numbness, paralysis, or weakness in your face, arm, or leg, especially on only one side of your body. ¨ Sudden vision changes. ¨ Sudden trouble speaking. ¨ Sudden confusion or trouble understanding simple statements. ¨ Sudden problems with walking or balance. ¨ A sudden, severe headache that is different from past headaches. ?Call your doctor now or seek immediate medical care if:  ? · You have new or worse nausea and vomiting. ? · You have a new or higher fever. ? · Your headache gets much worse. ? Watch closely for changes in your health, and be sure to contact your doctor if:  ? · You are not getting better after 2 days (48 hours). Where can you learn more? Go to http://mario-barry.info/. Enter 84 17 84 in the search box to learn more about \"Tension Headache: Care Instructions. \"  Current as of: October 14, 2016  Content Version: 11.4  © 0675-9376 [employer]. Care instructions adapted under license by [employer] (which disclaims liability or warranty for this information). If you have questions about a medical condition or this instruction, always ask your healthcare professional. [place] [address] any warranty or liability for your use of this information. Migraine Headache: Care Instructions  Your Care Instructions  Migraines are painful, throbbing headaches that often start on one side of the head. They may cause nausea and vomiting and make you sensitive to light, sound, or smell. Without treatment, migraines can last from 4 hours to a few days.  Medicines can help prevent migraines or stop them after they have started. Your doctor can help you find which ones work best for you. Follow-up care is a key part of your treatment and safety. Be sure to make and go to all appointments, and call your doctor if you are having problems. It's also a good idea to know your test results and keep a list of the medicines you take. How can you care for yourself at home? · Do not drive if you have taken a prescription pain medicine. · Rest in a quiet, dark room until your headache is gone. Close your eyes, and try to relax or go to sleep. Don't watch TV or read. · Put a cold, moist cloth or cold pack on the painful area for 10 to 20 minutes at a time. Put a thin cloth between the cold pack and your skin. · Use a warm, moist towel or a heating pad set on low to relax tight shoulder and neck muscles. · Have someone gently massage your neck and shoulders. · Take your medicines exactly as prescribed. Call your doctor if you think you are having a problem with your medicine. You will get more details on the specific medicines your doctor prescribes. · Be careful not to take pain medicine more often than the instructions allow. You could get worse or more frequent headaches when the medicine wears off. To prevent migraines  · Keep a headache diary so you can figure out what triggers your headaches. Avoiding triggers may help you prevent headaches. Record when each headache began, how long it lasted, and what the pain was like. (Was it throbbing, aching, stabbing, or dull?) Write down any other symptoms you had with the headache, such as nausea, flashing lights or dark spots, or sensitivity to bright light or loud noise. Note if the headache occurred near your period. List anything that might have triggered the headache. Triggers may include certain foods (chocolate, cheese, wine) or odors, smoke, bright light, stress, or lack of sleep.   · If your doctor has prescribed medicine for your migraines, take it as directed. You may have medicine that you take only when you get a migraine and medicine that you take all the time to help prevent migraines. ¨ If your doctor has prescribed medicine for when you get a headache, take it at the first sign of a migraine, unless your doctor has given you other instructions. ¨ If your doctor has prescribed medicine to prevent migraines, take it exactly as prescribed. Call your doctor if you think you are having a problem with your medicine. · Find healthy ways to deal with stress. Migraines are most common during or right after stressful times. Take time to relax before and after you do something that has caused a migraine in the past.  · Try to keep your muscles relaxed by keeping good posture. Check your jaw, face, neck, and shoulder muscles for tension. Try to relax them. When you sit at a desk, change positions often. And make sure to stretch for 30 seconds each hour. · Get plenty of sleep and exercise. · Eat meals on a regular schedule. Avoid foods and drinks that often trigger migraines. These include chocolate, alcohol (especially red wine and port), aspartame, monosodium glutamate (MSG), and some additives found in foods (such as hot dogs, bronson, cold cuts, aged cheeses, and pickled foods). · Limit caffeine. Don't drink too much coffee, tea, or soda. But don't quit caffeine suddenly. That can also give you migraines. · Do not smoke or allow others to smoke around you. If you need help quitting, talk to your doctor about stop-smoking programs and medicines. These can increase your chances of quitting for good. · If you are taking birth control pills or hormone therapy, talk to your doctor about whether they are triggering your migraines. When should you call for help? Call 911 anytime you think you may need emergency care. For example, call if:  ? · You have signs of a stroke.  These may include:  ¨ Sudden numbness, paralysis, or weakness in your face, arm, or leg, especially on only one side of your body. ¨ Sudden vision changes. ¨ Sudden trouble speaking. ¨ Sudden confusion or trouble understanding simple statements. ¨ Sudden problems with walking or balance. ¨ A sudden, severe headache that is different from past headaches. ?Call your doctor now or seek immediate medical care if:  ? · You have new or worse nausea and vomiting. ? · You have a new or higher fever. ? · Your headache gets much worse. ? Watch closely for changes in your health, and be sure to contact your doctor if:  ? · You are not getting better after 2 days (48 hours). Where can you learn more? Go to http://mario-barry.info/. Enter C275 in the search box to learn more about \"Migraine Headache: Care Instructions. \"  Current as of: October 14, 2016  Content Version: 11.4  © 4897-9185 [employer]. Care instructions adapted under license by [employer] (which disclaims liability or warranty for this information). If you have questions about a medical condition or this instruction, always ask your healthcare professional. [place] [address] any warranty or liability for your use of this information. High Cholesterol: Care Instructions  Your Care Instructions    Cholesterol is a type of fat in your blood. It is needed for many body functions, such as making new cells. Cholesterol is made by your body. It also comes from food you eat. High cholesterol means that you have too much of the fat in your blood. This raises your risk of a heart attack and stroke. LDL and HDL are part of your total cholesterol. LDL is the \"bad\" cholesterol. High LDL can raise your risk for heart disease, heart attack, and stroke. HDL is the \"good\" cholesterol. It helps clear bad cholesterol from the body. High HDL is linked with a lower risk of heart disease, heart attack, and stroke.   Your cholesterol levels help your doctor find out your risk for having a heart attack or stroke. You and your doctor can talk about whether you need to lower your risk and what treatment is best for you. A heart-healthy lifestyle along with medicines can help lower your cholesterol and your risk. The way you choose to lower your risk will depend on how high your risk is for heart attack and stroke. It will also depend on how you feel about taking medicines. Follow-up care is a key part of your treatment and safety. Be sure to make and go to all appointments, and call your doctor if you are having problems. It's also a good idea to know your test results and keep a list of the medicines you take. How can you care for yourself at home? · Eat a variety of foods every day. Good choices include fruits, vegetables, whole grains (like oatmeal), dried beans and peas, nuts and seeds, soy products (like tofu), and fat-free or low-fat dairy products. · Replace butter, margarine, and hydrogenated or partially hydrogenated oils with olive and canola oils. (Canola oil margarine without trans fat is fine.)  · Replace red meat with fish, poultry, and soy protein (like tofu). · Limit processed and packaged foods like chips, crackers, and cookies. · Bake, broil, or steam foods. Don't rodriguez them. · Be physically active. Get at least 30 minutes of exercise on most days of the week. Walking is a good choice. You also may want to do other activities, such as running, swimming, cycling, or playing tennis or team sports. · Stay at a healthy weight or lose weight by making the changes in eating and physical activity listed above. Losing just a small amount of weight, even 5 to 10 pounds, can reduce your risk for having a heart attack or stroke. · Do not smoke. When should you call for help? Watch closely for changes in your health, and be sure to contact your doctor if:  ? · You need help making lifestyle changes. ? · You have questions about your medicine. Where can you learn more?   Go to http://mario-barry.info/. Enter K510 in the search box to learn more about \"High Cholesterol: Care Instructions. \"  Current as of: September 21, 2016  Content Version: 11.4  © 9186-6530 [employer]. Care instructions adapted under license by [employer] (which disclaims liability or warranty for this information). If you have questions about a medical condition or this instruction, always ask your healthcare professional. [address] any warranty or liability for your use of this information. Benign Essential Tremor: Care Instructions  Your Care Instructions    Benign essential tremor is a medical term for shaking that you can't control. Your hand or fingers may shake when you lift a cup or point at something. Or your voice may shake when you speak. This type of tremor is not harmful. It is not caused by a stroke or Parkinson's disease. Some things can affect how much you shake. For example, drinking or eating something with caffeine may make tremors worse for a while. Some medicines also can increase tremors. These include antidepressants and too much thyroid replacement. Talk to your doctor if you think one of your medicines makes your tremors worse. If you are self-conscious about your tremors, there are some things you can do to reduce them or make them less noticeable. This includes taking medicine. Follow-up care is a key part of your treatment and safety. Be sure to make and go to all appointments, and call your doctor if you are having problems. It's also a good idea to know your test results and keep a list of the medicines you take. How can you care for yourself at home? · Take your medicines exactly as prescribed. Call your doctor if you think you are having a problem with your medicine. Some medicines that help control tremors have to be taken every day, even if you are not having tremors.  You will get more details on the specific medicines your doctor prescribes. · Get plenty of rest.  · Eat a balanced, healthy diet. · Try to reduce stress. Regular exercise and massages may help. · Limit alcohol. Heavy drinking can make your tremors worse. · Avoid drinks or foods with caffeine if they make your tremors worse. These include tea, cola, coffee, and chocolate. · Wear a heavy bracelet or watch. This adds a little weight to your hand. The extra weight may reduce tremors. · Drink from cups or glasses that are only half full. You may also want to try drinking with a straw. When should you call for help? Watch closely for changes in your health, and be sure to contact your doctor if:  ? · You notice your tremors are getting worse. ? · You can't do your everyday activities because of your tremors. ? · You are sad and embarrassed about your shaking. Where can you learn more? Go to http://mario-barry.info/. Enter B746 in the search box to learn more about \"Benign Essential Tremor: Care Instructions. \"  Current as of: October 14, 2016  Content Version: 11.4  © 3494-5308 [employer]. Care instructions adapted under license by [employer] (which disclaims liability or warranty for this information). If you have questions about a medical condition or this instruction, always ask your healthcare professional. [address] any warranty or liability for your use of this information. Introducing [facility]! Dear Aman [place]: Thank you for requesting a [employer] account. Our records indicate that you already have an active [employer] account. You can access your account anytime at [email or web address]     Did you know that you can access your hospital and ER discharge instructions at any time in [employer]? You can also review all of your test results from your hospital stay or ER visit.     Additional Information    If you have questions, please visit the Frequently Asked Questions section of the MyChart website at [email or web address] Remember, [employer] is NOT to be used for urgent needs. For medical emergencies, dial 911. Now available from your iPhone and Android! Please provide this summary of care documentation to your next provider. Your primary care clinician is listed as Srinath Sims. If you have any questions after today's visit, please call [phone].

## 2018-06-11 NOTE — PROGRESS NOTES
NEUROLOGY CLINIC NOTE    Patient ID:  Zona Najjar  [id number]  75 y.o.  1970    Date of Visit:  June 11, 2018    Reason for Visit: Establish care    Chief Complaint   Patient presents with    Follow-up     Headaches/migraines       History of Present Illness:     Patient Active Problem List    Diagnosis Date Noted    Severe obesity (BMI 35.0-39. 9) with comorbidity ([address] 04/16/2018    Chronic insomnia 03/28/2017    Non morbid obesity 03/28/2017    Vitamin D deficiency 10/20/2015    Allergic rhinitis 06/14/2013    Hypercholesteremia 01/23/2012    Allergy 07/14/2011    Mild intermittent asthma without complication 70/51/3504    Migraines 07/14/2011    GERD (gastroesophageal reflux disease) 07/14/2011    Headache(784.0) 07/14/2011    Cerebral vascular malformation 07/14/2011     Past Medical History:   Diagnosis Date    Allergic rhinitis 6/14/2013    Asthma     Chronic insomnia 3/28/2017    GERD (gastroesophageal reflux disease)     Headache     Headache(784.0)     Hypercholesteremia 1/23/2012    Non morbid obesity 3/28/2017    Snoring     Vitamin D deficiency 10/20/2015      Past Surgical History:   Procedure Laterality Date    ENDOSCOPY, COLON, DIAGNOSTIC  12/03 & 04/09    HX GYN      Laproscopy, BTL,TVH    HX GYN      hysterectomy      Prior to Admission medications    Medication Sig Start Date End Date Taking? Authorizing Provider   pravastatin (PRAVACHOL) 20 mg tablet TAKE 1 TABLET BY MOUTH EVERY NIGHT 5/15/18  Yes Willa Bueno MD   propranolol (INDERAL) 10 mg tablet  1/22/18  Yes Historical Provider   topiramate (TOPAMAX) 25 mg tablet  2/19/18  Yes Historical Provider   ZOLMitriptan (ZOMIG) 5 mg tablet TK 1 T PO 1 TIME PRF MIGRAINE HEADACHE. MAY REPEAT DOSE 1 TIME IN 2 H 2/19/18  Yes Historical Provider   traZODone (DESYREL) 50 mg tablet Take 1 Tab by mouth nightly.  5/1/18  Yes Willa Bueno MD   albuterol (PROVENTIL HFA, VENTOLIN HFA, PROAIR HFA) 90 mcg/actuation inhaler Take 2 Puffs by inhalation every four (4) hours as needed for Wheezing or Shortness of Breath. 12/13/17  Yes Chiquita Nguyen MD   albuterol-ipratropium (DUO-NEB) 2.5 mg-0.5 mg/3 ml nebu 3 mL by Nebulization route every four (4) hours as needed. 12/13/17  Yes Chiquita Nguyen MD   butalbital-acetaminophen-caff (FIORICET) -40 mg per capsule TAKE 1 TO 2 CAPSULE BY MOUTH EVERY 8 HOURS AS NEEDED FOR HEADACHES 10/8/16  Yes Fermin Hudson MD   HYDROcodone-acetaminophen (NORCO) 7.5-325 mg per tablet TK 1 T PO  BID PRF 30 DAYS 1/23/18   Historical Provider   cyclobenzaprine (FLEXERIL) 5 mg tablet Take 5 mg by mouth. Historical Provider   CAMBIA 50 mg pwpk MIX AND DRINK 1 PACKET AT ONSET OF HEADACHE 11/26/17   Fermin Hudson MD     Allergies   Allergen Reactions    Latex Rash    Aspirin Other (comments)     \"It doesn't digest in my system\"      Social History   Substance Use Topics    Smoking status: Never Smoker    Smokeless tobacco: Never Used    Alcohol use Yes      Comment: Occasionally      Family History   Problem Relation Age of Onset    Hypertension Father     Kidney Disease Maternal Grandmother     Hypertension Maternal Grandmother     Heart Attack Paternal Grandfather     Hypertension Mother     Cancer Maternal Grandfather      lung        Subjective:      Leslie Davis is a 56 y.o. RHAA female with history of chronic migraines, chronic tension headache and hyperlipidemia who is here to establish her care. Patient was a previous patient at [facility] [place] [address] and "[facility] neurology. Patient was being seen for headaches. Onset 15 years ago. Severity: moderate to severe. It occurs daily, has more than one hour duration and is unchanged. Location was bitemporal and occipital. There was radiation to the neck and shoulders. The patient describes it as pressure and ache. Timing of headache: upon awakening. Denies aggravating factors. Denies relieving factors.  Associated symptoms include phonophobia. Pertinent negatives include blurred vision, family history of migraine, nausea, photophobia, vertigo and vomiting. Per patient condition started since 2002. Previously seen by neurosurgery who did a brain MRI last 3/27/2002 which showed a small venous angioma right occipital lobe. Acute onset of two types of headache. One that is pressure like. 9/10 at the back of her head (R>L)  and the other bitemporal achy like that is 5/10 in intensity. It lasts for hours but she does have headache free periods during the day. Associated with slowed thought process and noise sensitivity. Brain MRI done with and without contrast 10/20/2011 which was unremarkable except for the persistent right occipital venous anomaly. Patient was diagnosed with occipital neuralgia, chronic tension-type  headache and congenital anomaly of the cerebrovascular system. When patient was seen at [facility] on 5/9/2012 she reported compliance with her medication regimen and she was having significant benefit. She was not having any major headaches and rarely uses the Fioricet. She was taking Propranolol 10 mg BID and Amitriptyline 30 mg at bedtime. Amitriptyline was helping her sleep. She also was evaluated last  10/29/14 and 12/31/2014 for dizziness and vertigo. She was seen by Dr. Joe Tsai (ENT) and was cleared. Patient was known to be none compliant with follow up visit when she is well. She was last seen at [facility] 5/28/2015, then she reported improvement of her headaches in the sense that they are less intense but it still occurs everyday. Patient has previous tried Ibuprofen and Naproxen without any relief. The Cambia helps abort it immediately. BUPAP helps relieve it after 15 to 30 minutes and she takes it everyday. Denies any side effects. The headaches however recurs. She is also sleeping better with the Temazepam 30 mg at bedtime. Denies any sedating or cognitive side effects. She tries to correct her posture.  She has been doing the neck and shoulder exercises. Plan was to continue her regimen. Since December 2017, patient reports worsening of her headache again. She apparently has been in and out of the hospital [facility] from end of December 2017 to first week of January for respiratory issues with multiple prednisone treatments. The last 1/12/2018 patient was at the [facility] ER for severe headaches and underwent lumbar puncture. CSF studies were unremarkable. Elevated WBC (14.91). Since then, patient has been having abrupt onset when sitting or standing of bitemporal and bilateral occiput pain. Pressure like with neck stiffness. 8/10. Madhav Freedman takes the edge off it. Associated with hot flashes and slight blurring of vision. Resolves with laying down. Patient did go back to the ER at [facility]. She did get a blood patch. It helped improve the positional headache and neck stiffness. Patient underwent a brain MRI with and without contrast 1/29/2018 was normal. No abnormal enhancements. Since her last evaluation 3/19/2018, patient reports significant improvement of her headaches. It is less frequent and intense. Headaches occur once a week. Fioricet as needed offers relief. She has not found the need to try Zomig. Last headache was a week PTC. She did increase in Topiramate and is now taking 50 mg BID. Denies any side effects. She also takes Propranolol 10 mg BID. Denies any side effects. She mentions issues with intermittent hand tremors more obvious when she is upset of anxious. No stiffness. Labs done 3/19/2018 were unremarkable (CBC and CMP) with Topiramate level of 3.8. Outside reports reviewed: office notes, historical medical records.     Review of Systems:    A comprehensive review of systems was negative except for:   Constitutional: positive for weight gain  Eyes: positive for none  Ears, nose, mouth, throat, and face: positive for snoring  Respiratory: positive for none  Cardiovascular: positive for none  Gastrointestinal: positive for none  Genitourinary: positive for none  Integument/breast: positive for none  Hematologic/lymphatic: positive for none  Musculoskeletal: positive for none  Neurological: positive for headache  Behavioral/Psych: positive for none  Endocrine: positive for none  Allergic/Immunologic: positive for none      Objective:     Visit Vitals    /80    Pulse 70    Ht 5' 5\" (1.651 m)    Wt 208 lb 9.6 oz (94.6 kg)    SpO2 98%    BMI 34.71 kg/m2       PHYSICAL EXAM:    NEUROLOGICAL EXAM:    Appearance: The patient is obese, provides a coherent history and is in no acute distress. Mental Status: Oriented to time, place and person. Fluent, no aphasia or dysarthria. Mood and affect appropriate. Cranial Nerves:   Intact visual fields. Fundi are benign. KANNAN, EOM's full, no nystagmus, no ptosis. Facial sensation is normal. Corneal reflexes are intact. Facial movement is symmetric. Hearing is normal bilaterally. Palate is midline with normal elevation. Sternocleidomastoid and trapezius muscles are normal. Tongue is midline. Motor:  5/5 strength in upper and lower proximal and distal muscles. Normal bulk and tone. No fasciculations. Reflexes:   Deep tendon reflexes 2+/4 and symmetrical. Downgoing toes. Sensory:   Normal to touch, pinprick and vibration. Gait:  Normal gait. No Romberg. Can do heel/toe/tandem walking. Tremor:   Mild posture L>R hand tremors noted. No cogwheel rigidity. Cerebellar:  Intact FTN/CASPER/HTS. Neurovascular:  Normal heart sounds and regular rhythm, peripheral pulses intact, and no carotid bruits. Anterior head posture (2 FB) with shoulders rotated in       Assessment:   Chronic migraine intractable without aura - stable  Chronic tension type headache - stable  Mixed hyperlipidemia - persistent  Essential tremors  - persistent    Plan:   1. Neurology exam is non-focal. Recent brain MRI with and without contrast was normal. Responding to current medication.  Continue Topiramate 50 mg BID. Prescription was provided. Check CBC, CMP and topiramate levels. Advise to try Zolmitriptan 5 mg, 1/2 tab for abortive therapy. If with side effects then I will  change it. 2. Continue Propranolol 10 mg BID. Encouraged to continue to correct her anterior head posture. Do the neck and shoulder exercises taught before as well. Continue Fioricet for abortive therapy. Prescription was provided. 3. History of hyperlipidemia on statin regimen. Needs recheck of lipid panel to assess for effectiveness of therapy. Labs ordered. 4. Exam reveals mainly postural L>R UE tremors. Not much intentional or resting hand tremors. No tone changes or cogwheel rigidity. No bradykinesia. No evidence at this time to support a diagnosis of Parkinson's disease. Findings consistent with essential tremors. Normal brain MRI. Patient was advised that current regimen of Propranolol and Topiramate are actually medications used to treat tremors. She understood. All questions and concerns were answered.

## 2018-06-11 NOTE — PATIENT INSTRUCTIONS
A Healthy Lifestyle: Care Instructions  Your Care Instructions    A healthy lifestyle can help you feel good, stay at a healthy weight, and have plenty of energy for both work and play. A healthy lifestyle is something you can share with your whole family. A healthy lifestyle also can lower your risk for serious health problems, such as high blood pressure, heart disease, and diabetes. You can follow a few steps listed below to improve your health and the health of your family. Follow-up care is a key part of your treatment and safety. Be sure to make and go to all appointments, and call your doctor if you are having problems. It's also a good idea to know your test results and keep a list of the medicines you take. How can you care for yourself at home? · Do not eat too much sugar, fat, or fast foods. You can still have dessert and treats now and then. The goal is moderation. · Start small to improve your eating habits. Pay attention to portion sizes, drink less juice and soda pop, and eat more fruits and vegetables. ¨ Eat a healthy amount of food. A 3-ounce serving of meat, for example, is about the size of a deck of cards. Fill the rest of your plate with vegetables and whole grains. ¨ Limit the amount of soda and sports drinks you have every day. Drink more water when you are thirsty. ¨ Eat at least 5 servings of fruits and vegetables every day. It may seem like a lot, but it is not hard to reach this goal. A serving or helping is 1 piece of fruit, 1 cup of vegetables, or 2 cups of leafy, raw vegetables. Have an apple or some carrot sticks as an afternoon snack instead of a candy bar. Try to have fruits and/or vegetables at every meal.  · Make exercise part of your daily routine. You may want to start with simple activities, such as walking, bicycling, or slow swimming. Try to be active 30 to 60 minutes every day. You do not need to do all 30 to 60 minutes all at once.  For example, you can exercise 3 times a day for 10 or 20 minutes. Moderate exercise is safe for most people, but it is always a good idea to talk to your doctor before starting an exercise program.  · Keep moving. Zi Dine the lawn, work in the garden, or Dog[employer]. Take the stairs instead of the elevator at work. · If you smoke, quit. People who smoke have an increased risk for heart attack, stroke, cancer, and other lung illnesses. Quitting is hard, but there are ways to boost your chance of quitting tobacco for good. ¨ Use nicotine gum, patches, or lozenges. ¨ Ask your doctor about stop-smoking programs and medicines. ¨ Keep trying. In addition to reducing your risk of diseases in the future, you will notice some benefits soon after you stop using tobacco. If you have shortness of breath or asthma symptoms, they will likely get better within a few weeks after you quit. · Limit how much alcohol you drink. Moderate amounts of alcohol (up to 2 drinks a day for men, 1 drink a day for women) are okay. But drinking too much can lead to liver problems, high blood pressure, and other health problems. Family health  If you have a family, there are many things you can do together to improve your health. · Eat meals together as a family as often as possible. · Eat healthy foods. This includes fruits, vegetables, lean meats and dairy, and whole grains. · Include your family in your fitness plan. Most people think of activities such as jogging or tennis as the way to fitness, but there are many ways you and your family can be more active. Anything that makes you breathe hard and gets your heart pumping is exercise. Here are some tips:  ¨ Walk to do errands or to take your child to school or the bus. ¨ Go for a family bike ride after dinner instead of watching TV. Where can you learn more? Go to http://mario-barry.info/. Enter C629 in the search box to learn more about \"A Healthy Lifestyle: Care Instructions. \"  Current as of: May 12, 2017  Content Version: 11.4  © 8114-4120 [employer]. Care instructions adapted under license by [employer] (which disclaims liability or warranty for this information). If you have questions about a medical condition or this instruction, always ask your healthcare professional. [address] any warranty or liability for your use of this information. Please be advised there is a $25 fee for all paperwork to be completed from our  providers. This is to be paid by the patient prior to picking up the completed forms. Tension Headache: Care Instructions  Your Care Instructions  Most headaches are tension headaches. These headaches tend to happen again, especially if you are under stress. A tension headache may cause pain or a feeling of pressure all over your head. You probably can't pinpoint the center of the pain. If you keep getting tension headaches, the best thing you can do to limit them is to find out what is causing them and then make changes in those areas. Follow-up care is a key part of your treatment and safety. Be sure to make and go to all appointments, and call your doctor if you are having problems. It's also a good idea to know your test results and keep a list of the medicines you take. How can you care for yourself at home? · Rest in a quiet, dark room with a cool cloth on your forehead until your headache is gone. Close your eyes, and try to relax or go to sleep. Don't watch TV or read. Avoid using the computer. · Use a warm, moist towel or a heating pad set on low to relax tight shoulder and neck muscles. · Have someone gently massage your neck and shoulders. · Take pain medicines exactly as directed. ¨ If the doctor gave you a prescription medicine for pain, take it as prescribed. ¨ If you are not taking a prescription pain medicine, ask your doctor if you can take an over-the-counter medicine.   · Be careful not to take pain medicine more often than the instructions allow, because you may get worse or more frequent headaches when the medicine wears off. · If you get another tension headache, stop what you are doing and sit quietly for a moment. Close your eyes and breathe slowly. Try to relax your head and neck muscles. · Do not ignore new symptoms that occur with a headache, such as fever, weakness or numbness, vision changes, or confusion. These may be signs of a more serious problem. To help prevent headaches  · Keep a headache diary so you can figure out what triggers your headaches. Avoiding triggers may help you prevent headaches. Record when each headache began, how long it lasted, and what the pain was like (throbbing, aching, stabbing, or dull). List anything that may have triggered the headache, such as being physically or emotionally stressed or being anxious or depressed. Other possible triggers are hunger, anger, fatigue, poor posture, and muscle strain. · Find healthy ways to deal with stress. Headaches are most common during or right after stressful times. Take time to relax before and after you do something that has caused a headache in the past.  · Exercise daily to relieve stress. Relaxation exercises may help reduce tension. · Get plenty of sleep. · Eat regularly and well. Long periods without food can trigger a headache. · Treat yourself to a massage. Some people find that massages are very helpful in relieving tension. · Try to keep your muscles relaxed by keeping good posture. Check your jaw, face, neck, and shoulder muscles for tension, and try to relax them. When sitting at a desk, change positions often, and stretch for 30 seconds each hour. · Reduce eyestrain from computers by blinking frequently and looking away from the computer screen every so often. Make sure you have proper eyewear and that your monitor is set up properly, about an arm's length away. When should you call for help?   Call [phone] anytime you think you may need emergency care. For example, call if:  ? · You have signs of a stroke. These may include:  ¨ Sudden numbness, paralysis, or weakness in your face, arm, or leg, especially on only one side of your body. ¨ Sudden vision changes. ¨ Sudden trouble speaking. ¨ Sudden confusion or trouble understanding simple statements. ¨ Sudden problems with walking or balance. ¨ A sudden, severe headache that is different from past headaches. ?Call your doctor now or seek immediate medical care if:  ? · You have new or worse nausea and vomiting. ? · You have a new or higher fever. ? · Your headache gets much worse. ? Watch closely for changes in your health, and be sure to contact your doctor if:  ? · You are not getting better after 2 days (48 hours). Where can you learn more? Go to http://mario-barry.info/. Enter 84 [id number] in the search box to learn more about \"Tension Headache: Care Instructions. \"  Current as of: October 14, 2016  Content Version: 11.4  © 3860-3899 [employer]. Care instructions adapted under license by [employer] (which disclaims liability or warranty for this information). If you have questions about a medical condition or this instruction, always ask your healthcare professional. [place] [address] any warranty or liability for your use of this information. Migraine Headache: Care Instructions  Your Care Instructions  Migraines are painful, throbbing headaches that often start on one side of the head. They may cause nausea and vomiting and make you sensitive to light, sound, or smell. Without treatment, migraines can last from 4 hours to a few days. Medicines can help prevent migraines or stop them after they have started. Your doctor can help you find which ones work best for you. Follow-up care is a key part of your treatment and safety.  Be sure to make and go to all appointments, and call your doctor if you are having problems. It's also a good idea to know your test results and keep a list of the medicines you take. How can you care for yourself at home? · Do not drive if you have taken a prescription pain medicine. · Rest in a quiet, dark room until your headache is gone. Close your eyes, and try to relax or go to sleep. Don't watch TV or read. · Put a cold, moist cloth or cold pack on the painful area for 10 to 20 minutes at a time. Put a thin cloth between the cold pack and your skin. · Use a warm, moist towel or a heating pad set on low to relax tight shoulder and neck muscles. · Have someone gently massage your neck and shoulders. · Take your medicines exactly as prescribed. Call your doctor if you think you are having a problem with your medicine. You will get more details on the specific medicines your doctor prescribes. · Be careful not to take pain medicine more often than the instructions allow. You could get worse or more frequent headaches when the medicine wears off. To prevent migraines  · Keep a headache diary so you can figure out what triggers your headaches. Avoiding triggers may help you prevent headaches. Record when each headache began, how long it lasted, and what the pain was like. (Was it throbbing, aching, stabbing, or dull?) Write down any other symptoms you had with the headache, such as nausea, flashing lights or dark spots, or sensitivity to bright light or loud noise. Note if the headache occurred near your period. List anything that might have triggered the headache. Triggers may include certain foods (chocolate, cheese, wine) or odors, smoke, bright light, stress, or lack of sleep. · If your doctor has prescribed medicine for your migraines, take it as directed. You may have medicine that you take only when you get a migraine and medicine that you take all the time to help prevent migraines.   ¨ If your doctor has prescribed medicine for when you get a headache, take it at the first sign of a migraine, unless your doctor has given you other instructions. ¨ If your doctor has prescribed medicine to prevent migraines, take it exactly as prescribed. Call your doctor if you think you are having a problem with your medicine. · Find healthy ways to deal with stress. Migraines are most common during or right after stressful times. Take time to relax before and after you do something that has caused a migraine in the past.  · Try to keep your muscles relaxed by keeping good posture. Check your jaw, face, neck, and shoulder muscles for tension. Try to relax them. When you sit at a desk, change positions often. And make sure to stretch for 30 seconds each hour. · Get plenty of sleep and exercise. · Eat meals on a regular schedule. Avoid foods and drinks that often trigger migraines. These include chocolate, alcohol (especially red wine and port), aspartame, monosodium glutamate (MSG), and some additives found in foods (such as hot dogs, bronson, cold cuts, aged cheeses, and pickled foods). · Limit caffeine. Don't drink too much coffee, tea, or soda. But don't quit caffeine suddenly. That can also give you migraines. · Do not smoke or allow others to smoke around you. If you need help quitting, talk to your doctor about stop-smoking programs and medicines. These can increase your chances of quitting for good. · If you are taking birth control pills or hormone therapy, talk to your doctor about whether they are triggering your migraines. When should you call for help? Call 911 anytime you think you may need emergency care. For example, call if:  ? · You have signs of a stroke. These may include:  ¨ Sudden numbness, paralysis, or weakness in your face, arm, or leg, especially on only one side of your body. ¨ Sudden vision changes. ¨ Sudden trouble speaking. ¨ Sudden confusion or trouble understanding simple statements. ¨ Sudden problems with walking or balance.   ¨ A sudden, severe headache that is different from past headaches. ?Call your doctor now or seek immediate medical care if:  ? · You have new or worse nausea and vomiting. ? · You have a new or higher fever. ? · Your headache gets much worse. ? Watch closely for changes in your health, and be sure to contact your doctor if:  ? · You are not getting better after 2 days (48 hours). Where can you learn more? Go to http://mario-barry.info/. Enter B104 in the search box to learn more about \"Migraine Headache: Care Instructions. \"  Current as of: October 14, 2016  Content Version: 11.4  © 4345-8881 [employer]. Care instructions adapted under license by [employer] (which disclaims liability or warranty for this information). If you have questions about a medical condition or this instruction, always ask your healthcare professional. [place] [address] any warranty or liability for your use of this information. High Cholesterol: Care Instructions  Your Care Instructions    Cholesterol is a type of fat in your blood. It is needed for many body functions, such as making new cells. Cholesterol is made by your body. It also comes from food you eat. High cholesterol means that you have too much of the fat in your blood. This raises your risk of a heart attack and stroke. LDL and HDL are part of your total cholesterol. LDL is the \"bad\" cholesterol. High LDL can raise your risk for heart disease, heart attack, and stroke. HDL is the \"good\" cholesterol. It helps clear bad cholesterol from the body. High HDL is linked with a lower risk of heart disease, heart attack, and stroke. Your cholesterol levels help your doctor find out your risk for having a heart attack or stroke. You and your doctor can talk about whether you need to lower your risk and what treatment is best for you. A heart-healthy lifestyle along with medicines can help lower your cholesterol and your risk. The way you choose to lower your risk will depend on how high your risk is for heart attack and stroke. It will also depend on how you feel about taking medicines. Follow-up care is a key part of your treatment and safety. Be sure to make and go to all appointments, and call your doctor if you are having problems. It's also a good idea to know your test results and keep a list of the medicines you take. How can you care for yourself at home? · Eat a variety of foods every day. Good choices include fruits, vegetables, whole grains (like oatmeal), dried beans and peas, nuts and seeds, soy products (like tofu), and fat-free or low-fat dairy products. · Replace butter, margarine, and hydrogenated or partially hydrogenated oils with olive and canola oils. (Canola oil margarine without trans fat is fine.)  · Replace red meat with fish, poultry, and soy protein (like tofu). · Limit processed and packaged foods like chips, crackers, and cookies. · Bake, broil, or steam foods. Don't rodriguez them. · Be physically active. Get at least 30 minutes of exercise on most days of the week. Walking is a good choice. You also may want to do other activities, such as running, swimming, cycling, or playing tennis or team sports. · Stay at a healthy weight or lose weight by making the changes in eating and physical activity listed above. Losing just a small amount of weight, even 5 to 10 pounds, can reduce your risk for having a heart attack or stroke. · Do not smoke. When should you call for help? Watch closely for changes in your health, and be sure to contact your doctor if:  ? · You need help making lifestyle changes. ? · You have questions about your medicine. Where can you learn more? Go to http://mario-barry.info/. Enter K130 in the search box to learn more about \"High Cholesterol: Care Instructions. \"  Current as of: September 21, 2016  Content Version: 11.4  © 9864-5999 [employer]. Care instructions adapted under license by [employer] (which disclaims liability or warranty for this information). If you have questions about a medical condition or this instruction, always ask your healthcare professional. [address] any warranty or liability for your use of this information. Benign Essential Tremor: Care Instructions  Your Care Instructions    Benign essential tremor is a medical term for shaking that you can't control. Your hand or fingers may shake when you lift a cup or point at something. Or your voice may shake when you speak. This type of tremor is not harmful. It is not caused by a stroke or Parkinson's disease. Some things can affect how much you shake. For example, drinking or eating something with caffeine may make tremors worse for a while. Some medicines also can increase tremors. These include antidepressants and too much thyroid replacement. Talk to your doctor if you think one of your medicines makes your tremors worse. If you are self-conscious about your tremors, there are some things you can do to reduce them or make them less noticeable. This includes taking medicine. Follow-up care is a key part of your treatment and safety. Be sure to make and go to all appointments, and call your doctor if you are having problems. It's also a good idea to know your test results and keep a list of the medicines you take. How can you care for yourself at home? · Take your medicines exactly as prescribed. Call your doctor if you think you are having a problem with your medicine. Some medicines that help control tremors have to be taken every day, even if you are not having tremors. You will get more details on the specific medicines your doctor prescribes. · Get plenty of rest.  · Eat a balanced, healthy diet. · Try to reduce stress. Regular exercise and massages may help. · Limit alcohol. Heavy drinking can make your tremors worse.   · Avoid drinks or foods with caffeine if they make your tremors worse. These include tea, cola, coffee, and chocolate. · Wear a heavy bracelet or watch. This adds a little weight to your hand. The extra weight may reduce tremors. · Drink from cups or glasses that are only half full. You may also want to try drinking with a straw. When should you call for help? Watch closely for changes in your health, and be sure to contact your doctor if:  ? · You notice your tremors are getting worse. ? · You can't do your everyday activities because of your tremors. ? · You are sad and embarrassed about your shaking. Where can you learn more? Go to http://mario-barry.info/. Enter B746 in the search box to learn more about \"Benign Essential Tremor: Care Instructions. \"  Current as of: October 14, 2016  Content Version: 11.4  © 6831-1011 [employer]. Care instructions adapted under license by [employer] (which disclaims liability or warranty for this information). If you have questions about a medical condition or this instruction, always ask your healthcare professional. [place] [address] any warranty or liability for your use of this information.

## 2018-06-13 LAB
ALBUMIN SERPL-MCNC: 4.6 G/DL (ref 3.5–5.5)
ALBUMIN/GLOB SERPL: 1.9 {RATIO} (ref 1.2–2.2)
ALP SERPL-CCNC: 72 IU/L (ref 39–117)
ALT SERPL-CCNC: 13 IU/L (ref 0–32)
AST SERPL-CCNC: 13 IU/L (ref 0–40)
BASOPHILS # BLD AUTO: 0.1 X10E3/UL (ref 0–0.2)
BASOPHILS NFR BLD AUTO: 1 %
BILIRUB DIRECT SERPL-MCNC: 0.1 MG/DL (ref 0–0.4)
BILIRUB SERPL-MCNC: 0.3 MG/DL (ref 0–1.2)
BUN SERPL-MCNC: 8 MG/DL (ref 6–24)
BUN/CREAT SERPL: 8 (ref 9–23)
CALCIUM SERPL-MCNC: 9.2 MG/DL (ref 8.7–10.2)
CHLORIDE SERPL-SCNC: 100 MMOL/L (ref 96–106)
CHOLEST SERPL-MCNC: 202 MG/DL (ref 100–199)
CO2 SERPL-SCNC: 26 MMOL/L (ref 20–29)
CREAT SERPL-MCNC: 0.95 MG/DL (ref 0.57–1)
EOSINOPHIL # BLD AUTO: 0.2 X10E3/UL (ref 0–0.4)
EOSINOPHIL NFR BLD AUTO: 2 %
ERYTHROCYTE [DISTWIDTH] IN BLOOD BY AUTOMATED COUNT: 14.3 % (ref 12.3–15.4)
GFR SERPLBLD CREATININE-BSD FMLA CKD-EPI: 72 ML/MIN/1.73
GFR SERPLBLD CREATININE-BSD FMLA CKD-EPI: 83 ML/MIN/1.73
GLOBULIN SER CALC-MCNC: 2.4 G/DL (ref 1.5–4.5)
GLUCOSE SERPL-MCNC: 88 MG/DL (ref 65–99)
HCT VFR BLD AUTO: 42.5 % (ref 34–46.6)
HDLC SERPL-MCNC: 62 MG/DL
HGB BLD-MCNC: 14 G/DL (ref 11.1–15.9)
IMM GRANULOCYTES # BLD: 0 X10E3/UL (ref 0–0.1)
IMM GRANULOCYTES NFR BLD: 0 %
LDLC SERPL CALC-MCNC: 117 MG/DL (ref 0–99)
LYMPHOCYTES # BLD AUTO: 2.9 X10E3/UL (ref 0.7–3.1)
LYMPHOCYTES NFR BLD AUTO: 28 %
MCH RBC QN AUTO: 29.2 PG (ref 26.6–33)
MCHC RBC AUTO-ENTMCNC: 32.9 G/DL (ref 31.5–35.7)
MCV RBC AUTO: 89 FL (ref 79–97)
MONOCYTES # BLD AUTO: 0.9 X10E3/UL (ref 0.1–0.9)
MONOCYTES NFR BLD AUTO: 9 %
NEUTROPHILS # BLD AUTO: 6.3 X10E3/UL (ref 1.4–7)
NEUTROPHILS NFR BLD AUTO: 60 %
PLATELET # BLD AUTO: 349 X10E3/UL (ref 150–379)
POTASSIUM SERPL-SCNC: 4.2 MMOL/L (ref 3.5–5.2)
PROT SERPL-MCNC: 7 G/DL (ref 6–8.5)
RBC # BLD AUTO: 4.79 X10E6/UL (ref 3.77–5.28)
SODIUM SERPL-SCNC: 139 MMOL/L (ref 134–144)
TOPIRAMATE SERPL-MCNC: NORMAL UG/ML (ref 2–25)
TRIGL SERPL-MCNC: 117 MG/DL (ref 0–149)
VLDLC SERPL CALC-MCNC: 23 MG/DL (ref 5–40)
WBC # BLD AUTO: 10.4 X10E3/UL (ref 3.4–10.8)

## 2018-06-20 DIAGNOSIS — G47.00 INSOMNIA, UNSPECIFIED TYPE: ICD-10-CM

## 2018-06-24 RX ORDER — TRAZODONE HYDROCHLORIDE 50 MG/1
TABLET ORAL
Qty: 30 TAB | Refills: 11 | Status: SHIPPED | OUTPATIENT
Start: 2018-06-24 | End: 2018-07-06 | Stop reason: DRUGHIGH

## 2018-07-06 ENCOUNTER — OFFICE VISIT (OUTPATIENT)
Dept: FAMILY MEDICINE CLINIC | Age: 48
End: 2018-07-06

## 2018-07-06 VITALS
BODY MASS INDEX: 34.49 KG/M2 | WEIGHT: 207 LBS | OXYGEN SATURATION: 99 % | HEIGHT: 65 IN | DIASTOLIC BLOOD PRESSURE: 84 MMHG | HEART RATE: 80 BPM | SYSTOLIC BLOOD PRESSURE: 127 MMHG | RESPIRATION RATE: 18 BRPM | TEMPERATURE: 97.2 F

## 2018-07-06 DIAGNOSIS — R31.9 HEMATURIA, UNSPECIFIED TYPE: ICD-10-CM

## 2018-07-06 DIAGNOSIS — M54.5 CHRONIC LOW BACK PAIN, UNSPECIFIED BACK PAIN LATERALITY, WITH SCIATICA PRESENCE UNSPECIFIED: ICD-10-CM

## 2018-07-06 DIAGNOSIS — G89.29 CHRONIC LOW BACK PAIN, UNSPECIFIED BACK PAIN LATERALITY, WITH SCIATICA PRESENCE UNSPECIFIED: ICD-10-CM

## 2018-07-06 DIAGNOSIS — L75.0 URINARY BODY ODOR: Primary | ICD-10-CM

## 2018-07-06 DIAGNOSIS — F51.04 CHRONIC INSOMNIA: ICD-10-CM

## 2018-07-06 LAB
BILIRUB UR QL STRIP: NEGATIVE
GLUCOSE UR-MCNC: NEGATIVE MG/DL
KETONES P FAST UR STRIP-MCNC: NEGATIVE MG/DL
PH UR STRIP: 5.5 [PH] (ref 4.6–8)
PROT UR QL STRIP: NEGATIVE
SP GR UR STRIP: 1.02 (ref 1–1.03)
UA UROBILINOGEN AMB POC: NORMAL (ref 0.2–1)
URINALYSIS CLARITY POC: CLEAR
URINALYSIS COLOR POC: YELLOW
URINE BLOOD POC: NORMAL
URINE LEUKOCYTES POC: NEGATIVE
URINE NITRITES POC: NEGATIVE

## 2018-07-06 RX ORDER — TRAZODONE HYDROCHLORIDE 100 MG/1
100 TABLET ORAL
Qty: 30 TAB | Refills: 0 | Status: SHIPPED | OUTPATIENT
Start: 2018-07-06 | End: 2018-09-04 | Stop reason: SDUPTHER

## 2018-07-06 NOTE — PROGRESS NOTES
HISTORY OF PRESENT ILLNESS  Johny Felix is a 56 y.o. female. HPI Comments: Johny Felix is here for urinary odor for the past two days. Other symptoms include low back pain, but she does have chronic low back pain. However, when it hurts she needs to urinate, which resolves the pain. Symptoms are unchanged except the odor is gone. Review of Systems   Constitutional: Negative for chills, fever and malaise/fatigue. Gastrointestinal: Negative for abdominal pain. Genitourinary: Negative for dysuria, flank pain, frequency, hematuria and urgency. Musculoskeletal: Positive for back pain. Visit Vitals    /84 (BP 1 Location: Left arm, BP Patient Position: Sitting)    Pulse 80    Temp 97.2 °F (36.2 °C) (Oral)    Resp 18    Ht 5' 5\" (1.651 m)    Wt 207 lb (93.9 kg)    SpO2 99%    BMI 34.45 kg/m2     Physical Exam   Constitutional: She is oriented to person, place, and time. She appears well-developed and well-nourished. No distress. Cardiovascular: Normal rate, regular rhythm and normal heart sounds. Exam reveals no gallop and no friction rub. No murmur heard. Pulmonary/Chest: Effort normal and breath sounds normal. No respiratory distress. She has no wheezes. She has no rales. Abdominal: Soft. Normal appearance and bowel sounds are normal. She exhibits no distension. There is no tenderness. There is no rebound, no guarding and no CVA tenderness. Neurological: She is alert and oriented to person, place, and time. Skin: Skin is warm and dry. She is not diaphoretic. Nursing note and vitals reviewed. Urine dipstick shows positive for RBC's. ASSESSMENT and PLAN    ICD-10-CM ICD-9-CM    1. Urinary body odor L75.0 705.89 AMB POC URINALYSIS DIP STICK AUTO W/O MICRO   2. Hematuria, unspecified type R31.9 599.70 URINALYSIS W/ RFLX MICROSCOPIC   3.  Chronic low back pain, unspecified back pain laterality, with sciatica presence unspecified M54.5 724.2 G89.29 338.29    4. Chronic insomnia F51.04 780.52 traZODone (DESYREL) 100 mg tablet        Reassured her that she does not appear to have a UTI  Back pain likely her usual problem  Dip positive for RBCs  Will get a micro on her UA  She mentions the trazodone is not working as well, trazodone increased    Follow-up Disposition:  Return if symptoms worsen or fail to improve. Reviewed plan of care. Patient has provided input and agrees with goals.

## 2018-07-10 LAB
APPEARANCE UR: CLEAR
BACTERIA #/AREA URNS HPF: ABNORMAL /[HPF]
BILIRUB UR QL STRIP: NEGATIVE
CASTS URNS QL MICRO: ABNORMAL /LPF
COLOR UR: YELLOW
EPI CELLS #/AREA URNS HPF: ABNORMAL /HPF
GLUCOSE UR QL: NEGATIVE
HGB UR QL STRIP: ABNORMAL
KETONES UR QL STRIP: NEGATIVE
LEUKOCYTE ESTERASE UR QL STRIP: NEGATIVE
MICRO URNS: ABNORMAL
MUCOUS THREADS URNS QL MICRO: PRESENT
NITRITE UR QL STRIP: NEGATIVE
PH UR STRIP: 5 [PH] (ref 5–7.5)
PROT UR QL STRIP: NEGATIVE
RBC #/AREA URNS HPF: >30 /HPF
SP GR UR: 1.02 (ref 1–1.03)
SPECIMEN STATUS REPORT, ROLRST: NORMAL
UROBILINOGEN UR STRIP-MCNC: 0.2 MG/DL (ref 0.2–1)
WBC #/AREA URNS HPF: ABNORMAL /HPF

## 2018-07-15 ENCOUNTER — TELEPHONE (OUTPATIENT)
Dept: FAMILY MEDICINE CLINIC | Age: 48
End: 2018-07-15

## 2018-07-15 DIAGNOSIS — R31.9 HEMATURIA, UNSPECIFIED TYPE: Primary | ICD-10-CM

## 2018-07-15 NOTE — LETTER
7/16/2018 8:25 AM    Ms. Robert Lopez Bonnie  [address] [facility]  [address] [facility] [id number]        Dear Ms. FairbyYolanda,    We have been unable to reach you by phone to notify you of your test results. Please call our office at [phone] and ask to speak with my nurse in order to explain these results to you and advise you of any recommendations.         Sincerely,      Agnes Benson MD

## 2018-07-16 NOTE — TELEPHONE ENCOUNTER
Please call patient and let her know she has blood in her urine and I would like for her to see urology.   I have printed a referral request.

## 2018-07-21 ENCOUNTER — OFFICE VISIT (OUTPATIENT)
Dept: URGENT CARE | Age: 48
End: 2018-07-21

## 2018-07-21 VITALS
WEIGHT: 204 LBS | TEMPERATURE: 98.9 F | HEIGHT: 66 IN | BODY MASS INDEX: 32.78 KG/M2 | HEART RATE: 97 BPM | RESPIRATION RATE: 18 BRPM | DIASTOLIC BLOOD PRESSURE: 72 MMHG | OXYGEN SATURATION: 98 % | SYSTOLIC BLOOD PRESSURE: 147 MMHG

## 2018-07-21 DIAGNOSIS — H93.8X1 EAR CONGESTION, RIGHT: ICD-10-CM

## 2018-07-21 DIAGNOSIS — K21.00 GASTROESOPHAGEAL REFLUX DISEASE WITH ESOPHAGITIS: ICD-10-CM

## 2018-07-21 DIAGNOSIS — R07.0 THROAT PAIN: Primary | ICD-10-CM

## 2018-07-21 RX ORDER — PREDNISONE 5 MG/1
TABLET ORAL
Qty: 21 TAB | Refills: 0 | Status: SHIPPED | OUTPATIENT
Start: 2018-07-21 | End: 2019-01-02

## 2018-07-21 RX ORDER — OMEPRAZOLE 40 MG/1
40 CAPSULE, DELAYED RELEASE ORAL DAILY
Qty: 30 CAP | Refills: 0 | Status: SHIPPED | OUTPATIENT
Start: 2018-07-21 | End: 2021-11-10

## 2018-07-21 RX ORDER — SUCRALFATE 1 G/10ML
1 SUSPENSION ORAL 4 TIMES DAILY
Qty: 280 ML | Refills: 0 | Status: SHIPPED | OUTPATIENT
Start: 2018-07-21 | End: 2018-07-28

## 2018-07-21 RX ORDER — METOCLOPRAMIDE 5 MG/1
5 TABLET ORAL
Qty: 30 TAB | Refills: 0 | Status: SHIPPED | OUTPATIENT
Start: 2018-07-21 | End: 2018-07-31

## 2018-07-21 NOTE — PROGRESS NOTES
Patient is a 56 y.o. female presenting with sore throat and epigastric pain. Sore Throat    The history is provided by the patient. This is a new problem. The current episode started more than 1 week ago. The problem has not changed since onset. There has been no fever. Associated symptoms include congestion and trouble swallowing. Pertinent negatives include no vomiting, no ear pain, no plugged ear sensation, no shortness of breath, no stiff neck and no cough. She has tried nothing for the symptoms. Epigastric Pain   This is a chronic problem. The current episode started more than 1 week ago. The problem occurs daily. The problem has not changed since onset. Associated symptoms include abdominal pain (in epigastric area ). Pertinent negatives include no chest pain and no shortness of breath. Nothing aggravates the symptoms. Nothing relieves the symptoms. She has tried nothing for the symptoms. Past Medical History:   Diagnosis Date    Allergic rhinitis 6/14/2013    Asthma     Chronic insomnia 3/28/2017    GERD (gastroesophageal reflux disease)     Headache     Headache(784.0)     Hypercholesteremia 1/23/2012    Non morbid obesity 3/28/2017    Snoring     Vitamin D deficiency 10/20/2015        Past Surgical History:   Procedure Laterality Date    ENDOSCOPY, COLON, DIAGNOSTIC  12/03 & 04/09    HX GYN      Laproscopy, BTL,TVH    HX GYN      hysterectomy         Family History   Problem Relation Age of Onset    Hypertension Father     Kidney Disease Maternal Grandmother     Hypertension Maternal Grandmother     Heart Attack Paternal Grandfather     Hypertension Mother     Cancer Maternal Grandfather      lung        Social History     Social History    Marital status:      Spouse name: N/A    Number of children: N/A    Years of education: N/A     Occupational History    Not on file.      Social History Main Topics    Smoking status: Never Smoker    Smokeless tobacco: Never Used  Alcohol use Yes      Comment: Occasionally    Drug use: No    Sexual activity: Yes     Partners: Male     Birth control/ protection: None     Other Topics Concern    Not on file     Social History Narrative                ALLERGIES: Latex and Aspirin    Review of Systems   HENT: Positive for congestion, sore throat and trouble swallowing. Negative for ear pain. Respiratory: Negative for cough and shortness of breath. Cardiovascular: Negative for chest pain. Gastrointestinal: Positive for abdominal distention (upper abdomen ), abdominal pain (in epigastric area ) and constipation. Negative for vomiting. All other systems reviewed and are negative. Vitals:    07/21/18 1848   BP: 147/72   Pulse: 97   Resp: 18   Temp: 98.9 °F (37.2 °C)   SpO2: 98%   Weight: 204 lb (92.5 kg)   Height: 5' 5.5\" (1.664 m)       Physical Exam   Constitutional: No distress. HENT:   Right Ear: Tympanic membrane and ear canal normal.   Left Ear: Tympanic membrane and ear canal normal.   Nose: Nose normal.   Mouth/Throat: No oropharyngeal exudate, posterior oropharyngeal edema or posterior oropharyngeal erythema. Eyes: Conjunctivae are normal. Right eye exhibits no discharge. Left eye exhibits no discharge. No scleral icterus. Neck: Neck supple. Pulmonary/Chest: Effort normal and breath sounds normal. No respiratory distress. She has no wheezes. She has no rales. Abdominal: Soft. Bowel sounds are normal. She exhibits no distension and no mass. There is no tenderness. There is no rebound and no guarding. Lymphadenopathy:     She has no cervical adenopathy. Skin: No rash noted. Nursing note and vitals reviewed. MDM    Procedures      ICD-10-CM ICD-9-CM    1. Throat pain R07.0 784.1     ? secondary to GERD   2. Ear congestion, right H93.8X1 388.8     eustachian tube dysfunction? 3.  Gastroesophageal reflux disease with esophagitis K21.0 530.11      Medications Ordered Today   Medications    omeprazole (PRILOSEC) 40 mg capsule     Sig: Take 1 Cap by mouth daily. Dispense:  30 Cap     Refill:  0    sucralfate (CARAFATE) 100 mg/mL suspension     Sig: Take 10 mL by mouth four (4) times daily for 7 days. Dispense:  280 mL     Refill:  0     Dispense pills if liquid not covered    metoclopramide HCl (REGLAN) 5 mg tablet     Sig: Take 1 Tab by mouth Before breakfast, lunch, and dinner for 10 days. Dispense:  30 Tab     Refill:  0    predniSONE (STERAPRED) 5 mg dose pack     Sig: See administration instruction per 5mg dose pack     Dispense:  21 Tab     Refill:  0     No results found for any visits on 07/21/18. The patients condition was discussed with the patient and they understand. The patient is to follow up with primary care doctor. If signs and symptoms become worse the pt is to go to the ER. The patient is to take medications as prescribed.

## 2018-07-21 NOTE — PATIENT INSTRUCTIONS
High fiber diet and lots of water    Use Miralax powder with Benafiber with 8 Oz of water     Gastroesophageal Reflux Disease (GERD): Care Instructions  Your Care Instructions    Gastroesophageal reflux disease (GERD) is the backward flow of stomach acid into the esophagus. The esophagus is the tube that leads from your throat to your stomach. A one-way valve prevents the stomach acid from moving up into this tube. When you have GERD, this valve does not close tightly enough. If you have mild GERD symptoms including heartburn, you may be able to control the problem with antacids or over-the-counter medicine. Changing your diet, losing weight, and making other lifestyle changes can also help reduce symptoms. Follow-up care is a key part of your treatment and safety. Be sure to make and go to all appointments, and call your doctor if you are having problems. It's also a good idea to know your test results and keep a list of the medicines you take. How can you care for yourself at home? · Take your medicines exactly as prescribed. Call your doctor if you think you are having a problem with your medicine. · Your doctor may recommend over-the-counter medicine. For mild or occasional indigestion, antacids, such as Tums, Gaviscon, Mylanta, or Maalox, may help. Your doctor also may recommend over-the-counter acid reducers, such as Pepcid AC, Tagamet HB, Zantac 75, or Prilosec. Read and follow all instructions on the label. If you use these medicines often, talk with your doctor. · Change your eating habits. ¨ It's best to eat several small meals instead of two or three large meals. ¨ After you eat, wait 2 to 3 hours before you lie down. ¨ Chocolate, mint, and alcohol can make GERD worse. ¨ Spicy foods, foods that have a lot of acid (like tomatoes and oranges), and coffee can make GERD symptoms worse in some people.  If your symptoms are worse after you eat a certain food, you may want to stop eating that food to see if your symptoms get better. · Do not smoke or chew tobacco. Smoking can make GERD worse. If you need help quitting, talk to your doctor about stop-smoking programs and medicines. These can increase your chances of quitting for good. · If you have GERD symptoms at night, raise the head of your bed 6 to 8 inches by putting the frame on blocks or placing a foam wedge under the head of your mattress. (Adding extra pillows does not work.)  · Do not wear tight clothing around your middle. · Lose weight if you need to. Losing just 5 to 10 pounds can help. When should you call for help? Call your doctor now or seek immediate medical care if:    · You have new or different belly pain.     · Your stools are black and tarlike or have streaks of blood.    Watch closely for changes in your health, and be sure to contact your doctor if:    · Your symptoms have not improved after 2 days.     · Food seems to catch in your throat or chest.   Where can you learn more? Go to http://mario-barry.info/. Enter X612 in the search box to learn more about \"Gastroesophageal Reflux Disease (GERD): Care Instructions. \"  Current as of: May 12, 2017  Content Version: 11.7  © 5850-5897 [employer]. Care instructions adapted under license by [employer] (which disclaims liability or warranty for this information). If you have questions about a medical condition or this instruction, always ask your healthcare professional. [place] [address] any warranty or liability for your use of this information.

## 2018-07-21 NOTE — MR AVS SNAPSHOT
[address] [facility] [address]  [phone]     Patient: Lexie Xiong  MRN: [id number]  :1970               Visit Information     Date & Time Provider Department Dept. Phone Encounter #    2018  6:15 PM [address] [facility] [phone] [id number]      Your Appointments     2018  1:40 PM   Follow Up with Cliff Rosales MD   [facility] [address])   Appt Note: Follow up headaches,lsw,18    [address] [facility],  [address] [facility], [address]  [phone]           [address] [facility], [id number], [address] [facility] Maintenance        Date Due    PAP AKA CERVICAL CYTOLOGY 10/9/1992    Influenza Age 9 to Adult 2018    DTaP/Tdap/Td series (2 - Td) 3/28/2027      Allergies as of 2018  Review Complete On: 2018 By: Anabela Virk RN       Severity Noted Reaction Type Reactions    Latex Medium 2013   Topical Rash    Aspirin  2017    Other (comments)    \"It doesn't digest in my system\"      Current Immunizations  Reviewed on 3/28/2017    No immunizations on file. Not reviewed this visit   You Were Diagnosed With        Codes Comments    Throat pain    -  Primary ICD-10-CM: R07.0  ICD-9-CM: 784.1 ? secondary to GERD    Ear congestion, right     ICD-10-CM: H93.8X1  ICD-9-CM: 388.8 eustachian tube dysfunction?     Gastroesophageal reflux disease with esophagitis     ICD-10-CM: K21.0  ICD-9-CM: 530.11       Vitals     BP Pulse Temp Resp Height(growth percentile) Weight(growth percentile)    147/72 97 98.9 °F (37.2 °C) 18 5' 5.5\" (1.664 m) 204 lb (92.5 kg)    SpO2 BMI OB Status Smoking Status          98% 33.43 kg/m2 Hysterectomy Never Smoker        BMI and BSA Data     Body Mass Index Body Surface Area    33.43 kg/m 2 2.07 m 2         Preferred Pharmacy       Pharmacy Name Phone    [address] [facility] [address] [facility] [place], [address] [place]  [place] [phone]         Your Updated Medication List          This list is accurate as of 7/21/18  7:23 PM.  Always use your most recent med list.                albuterol 90 mcg/actuation inhaler   Commonly known as:  PROVENTIL HFA, VENTOLIN HFA, PROAIR HFA   Take 2 Puffs by inhalation every four (4) hours as needed for Wheezing or Shortness of Breath. albuterol-ipratropium 2.5 mg-0.5 mg/3 ml Nebu   Commonly known as:  DUO-NEB   3 mL by Nebulization route every four (4) hours as needed. butalbital-acetaminophen-caff -40 mg per capsule   Commonly known as:  FIORICET   Take 1 Cap by mouth every six (6) hours as needed for Headache. cyclobenzaprine 5 mg tablet   Commonly known as:  FLEXERIL   Take 5 mg by mouth.       metoclopramide HCl 5 mg tablet   Commonly known as:  REGLAN   Take 1 Tab by mouth Before breakfast, lunch, and dinner for 10 days. omeprazole 40 mg capsule   Commonly known as:  PRILOSEC   Take 1 Cap by mouth daily. pravastatin 20 mg tablet   Commonly known as:  PRAVACHOL   TAKE 1 TABLET BY MOUTH EVERY NIGHT       predniSONE 5 mg dose pack   Commonly known as:  STERAPRED   See administration instruction per 5mg dose pack       propranolol 10 mg tablet   Commonly known as:  INDERAL       sucralfate 100 mg/mL suspension   Commonly known as:  CARAFATE   Take 10 mL by mouth four (4) times daily for 7 days. topiramate 50 mg tablet   Commonly known as:  TOPAMAX   Take 1 Tab by mouth two (2) times a day. traZODone 100 mg tablet   Commonly known as:  DESYREL   Take 1 Tab by mouth nightly. ZOLMitriptan 5 mg tablet   Commonly known as:  ZOMIG   TK 1 T PO 1 TIME PRF MIGRAINE HEADACHE. MAY REPEAT DOSE 1 TIME IN 2 H               Prescriptions Sent to Pharmacy        Refills    omeprazole (PRILOSEC) 40 mg capsule 0    Sig: Take 1 Cap by mouth daily.     Class: Normal    Pharmacy: [facility] [address] AT [address] [place] [address] Ph #: [phone]    Route: Oral    sucralfate (CARAFATE) 100 mg/mL suspension 0    Sig: Take 10 mL by mouth four (4) times daily for 7 days. Class: Normal    Pharmacy: [facility] [address] AT [address] [place] [address] Ph #: [phone]    Route: Oral    metoclopramide HCl (REGLAN) 5 mg tablet 0    Sig: Take 1 Tab by mouth Before breakfast, lunch, and dinner for 10 days. Class: Normal    Pharmacy: [facility] [address] AT [address] [place] [address] Ph #: [phone]    Route: Oral    predniSONE (STERAPRED) 5 mg dose pack 0    Sig: See administration instruction per 5mg dose pack    Class: Normal    Pharmacy: [facility] [place], [place] - [address] AT [address] [place] [address] Ph #: [phone]      Patient Instructions    High fiber diet and lots of water    Use Miralax powder with Benafiber with 8 Oz of water     Gastroesophageal Reflux Disease (GERD): Care Instructions  Your Care Instructions    Gastroesophageal reflux disease (GERD) is the backward flow of stomach acid into the esophagus. The esophagus is the tube that leads from your throat to your stomach. A one-way valve prevents the stomach acid from moving up into this tube. When you have GERD, this valve does not close tightly enough. If you have mild GERD symptoms including heartburn, you may be able to control the problem with antacids or over-the-counter medicine. Changing your diet, losing weight, and making other lifestyle changes can also help reduce symptoms. Follow-up care is a key part of your treatment and safety. Be sure to make and go to all appointments, and call your doctor if you are having problems. It's also a good idea to know your test results and keep a list of the medicines you take.   How can you care for yourself at home? · Take your medicines exactly as prescribed. Call your doctor if you think you are having a problem with your medicine. · Your doctor may recommend over-the-counter medicine. For mild or occasional indigestion, antacids, such as Tums, Gaviscon, Mylanta, or Maalox, may help. Your doctor also may recommend over-the-counter acid reducers, such as Pepcid AC, Tagamet HB, Zantac 75, or Prilosec. Read and follow all instructions on the label. If you use these medicines often, talk with your doctor. · Change your eating habits. ¨ It's best to eat several small meals instead of two or three large meals. ¨ After you eat, wait 2 to 3 hours before you lie down. ¨ Chocolate, mint, and alcohol can make GERD worse. ¨ Spicy foods, foods that have a lot of acid (like tomatoes and oranges), and coffee can make GERD symptoms worse in some people. If your symptoms are worse after you eat a certain food, you may want to stop eating that food to see if your symptoms get better. · Do not smoke or chew tobacco. Smoking can make GERD worse. If you need help quitting, talk to your doctor about stop-smoking programs and medicines. These can increase your chances of quitting for good. · If you have GERD symptoms at night, raise the head of your bed 6 to 8 inches by putting the frame on blocks or placing a foam wedge under the head of your mattress. (Adding extra pillows does not work.)  · Do not wear tight clothing around your middle. · Lose weight if you need to. Losing just 5 to 10 pounds can help. When should you call for help?   Call your doctor now or seek immediate medical care if:    · You have new or different belly pain.     · Your stools are black and tarlike or have streaks of blood.    Watch closely for changes in your health, and be sure to contact your doctor if:    · Your symptoms have not improved after 2 days.     · Food seems to catch in your throat or chest.   Where can you learn more?  Go to http://mario-barry.info/. Enter G364 in the search box to learn more about \"Gastroesophageal Reflux Disease (GERD): Care Instructions. \"  Current as of: May 12, 2017  Content Version: 11.7  © 9472-1859 [employer]. Care instructions adapted under license by [employer] (which disclaims liability or warranty for this information). If you have questions about a medical condition or this instruction, always ask your healthcare professional. [address] any warranty or liability for your use of this information. Introducing [facility]! Dear Roxana Stevenson: Thank you for requesting a Moovweb account. Our records indicate that you already have an active Moovweb account. You can access your account anytime at [email or web address]     Did you know that you can access your hospital and ER discharge instructions at any time in Moovweb? You can also review all of your test results from your hospital stay or ER visit. Additional Information    If you have questions, please visit the Frequently Asked Questions section of the Moovweb website at [email or web address] Remember, Moovweb is NOT to be used for urgent needs. For medical emergencies, dial 911. Now available from your iPhone and Android! Please provide this summary of care documentation to your next provider. Your primary care clinician is listed as Janet Guerrero. If you have any questions after today's visit, please call [phone].

## 2018-09-04 DIAGNOSIS — F51.04 CHRONIC INSOMNIA: ICD-10-CM

## 2018-09-05 RX ORDER — TRAZODONE HYDROCHLORIDE 100 MG/1
TABLET ORAL
Qty: 30 TAB | Refills: 11 | Status: SHIPPED | OUTPATIENT
Start: 2018-09-05 | End: 2019-11-13 | Stop reason: SDUPTHER

## 2019-01-02 ENCOUNTER — OFFICE VISIT (OUTPATIENT)
Dept: NEUROLOGY | Age: 49
End: 2019-01-02

## 2019-01-02 VITALS
HEIGHT: 65 IN | OXYGEN SATURATION: 97 % | HEART RATE: 97 BPM | DIASTOLIC BLOOD PRESSURE: 68 MMHG | SYSTOLIC BLOOD PRESSURE: 108 MMHG | WEIGHT: 184.8 LBS | BODY MASS INDEX: 30.79 KG/M2

## 2019-01-02 DIAGNOSIS — E78.2 MIXED HYPERLIPIDEMIA: ICD-10-CM

## 2019-01-02 DIAGNOSIS — G43.719 CHRONIC MIGRAINE WITHOUT AURA, INTRACTABLE, WITHOUT STATUS MIGRAINOSUS: Primary | ICD-10-CM

## 2019-01-02 DIAGNOSIS — G44.229 CHRONIC TENSION-TYPE HEADACHE, NOT INTRACTABLE: ICD-10-CM

## 2019-01-02 DIAGNOSIS — G25.0 ESSENTIAL TREMOR: ICD-10-CM

## 2019-01-02 RX ORDER — HYDROCODONE BITARTRATE AND ACETAMINOPHEN 7.5; 325 MG/1; MG/1
TABLET ORAL
COMMUNITY
End: 2019-04-19

## 2019-01-02 RX ORDER — PROPRANOLOL HYDROCHLORIDE 10 MG/1
10 TABLET ORAL 2 TIMES DAILY
Qty: 60 TAB | Refills: 5 | Status: SHIPPED | OUTPATIENT
Start: 2019-01-02 | End: 2019-09-10 | Stop reason: SDUPTHER

## 2019-01-02 RX ORDER — VALACYCLOVIR HYDROCHLORIDE 500 MG/1
500 TABLET, FILM COATED ORAL AS NEEDED
Refills: 2 | COMMUNITY
Start: 2018-12-23 | End: 2021-11-10 | Stop reason: ALTCHOICE

## 2019-01-02 NOTE — PATIENT INSTRUCTIONS
Migraine Headache: Care Instructions  Your Care Instructions  Migraines are painful, throbbing headaches that often start on one side of the head. They may cause nausea and vomiting and make you sensitive to light, sound, or smell. Without treatment, migraines can last from 4 hours to a few days. Medicines can help prevent migraines or stop them after they have started. Your doctor can help you find which ones work best for you. Follow-up care is a key part of your treatment and safety. Be sure to make and go to all appointments, and call your doctor if you are having problems. It's also a good idea to know your test results and keep a list of the medicines you take. How can you care for yourself at home? · Do not drive if you have taken a prescription pain medicine. · Rest in a quiet, dark room until your headache is gone. Close your eyes, and try to relax or go to sleep. Don't watch TV or read. · Put a cold, moist cloth or cold pack on the painful area for 10 to 20 minutes at a time. Put a thin cloth between the cold pack and your skin. · Use a warm, moist towel or a heating pad set on low to relax tight shoulder and neck muscles. · Have someone gently massage your neck and shoulders. · Take your medicines exactly as prescribed. Call your doctor if you think you are having a problem with your medicine. You will get more details on the specific medicines your doctor prescribes. · Be careful not to take pain medicine more often than the instructions allow. You could get worse or more frequent headaches when the medicine wears off. To prevent migraines  · Keep a headache diary so you can figure out what triggers your headaches. Avoiding triggers may help you prevent headaches. Record when each headache began, how long it lasted, and what the pain was like.  (Was it throbbing, aching, stabbing, or dull?) Write down any other symptoms you had with the headache, such as nausea, flashing lights or dark spots, or sensitivity to bright light or loud noise. Note if the headache occurred near your period. List anything that might have triggered the headache. Triggers may include certain foods (chocolate, cheese, wine) or odors, smoke, bright light, stress, or lack of sleep. · If your doctor has prescribed medicine for your migraines, take it as directed. You may have medicine that you take only when you get a migraine and medicine that you take all the time to help prevent migraines. ? If your doctor has prescribed medicine for when you get a headache, take it at the first sign of a migraine, unless your doctor has given you other instructions. ? If your doctor has prescribed medicine to prevent migraines, take it exactly as prescribed. Call your doctor if you think you are having a problem with your medicine. · Find healthy ways to deal with stress. Migraines are most common during or right after stressful times. Take time to relax before and after you do something that has caused a migraine in the past.  · Try to keep your muscles relaxed by keeping good posture. Check your jaw, face, neck, and shoulder muscles for tension. Try to relax them. When you sit at a desk, change positions often. And make sure to stretch for 30 seconds each hour. · Get plenty of sleep and exercise. · Eat meals on a regular schedule. Avoid foods and drinks that often trigger migraines. These include chocolate, alcohol (especially red wine and port), aspartame, monosodium glutamate (MSG), and some additives found in foods (such as hot dogs, bronson, cold cuts, aged cheeses, and pickled foods). · Limit caffeine. Don't drink too much coffee, tea, or soda. But don't quit caffeine suddenly. That can also give you migraines. · Do not smoke or allow others to smoke around you. If you need help quitting, talk to your doctor about stop-smoking programs and medicines. These can increase your chances of quitting for good.   · If you are taking birth control pills or hormone therapy, talk to your doctor about whether they are triggering your migraines. When should you call for help? Call 911 anytime you think you may need emergency care. For example, call if:    · You have signs of a stroke. These may include:  ? Sudden numbness, paralysis, or weakness in your face, arm, or leg, especially on only one side of your body. ? Sudden vision changes. ? Sudden trouble speaking. ? Sudden confusion or trouble understanding simple statements. ? Sudden problems with walking or balance. ? A sudden, severe headache that is different from past headaches.    Call your doctor now or seek immediate medical care if:    · You have new or worse nausea and vomiting.     · You have a new or higher fever.     · Your headache gets much worse.    Watch closely for changes in your health, and be sure to contact your doctor if:    · You are not getting better after 2 days (48 hours). Where can you learn more? Go to http://mario-barry.info/. Enter V814 in the search box to learn more about \"Migraine Headache: Care Instructions. \"  Current as of: June 4, 2018  Content Version: 11.8  © 8721-5889 [employer]. Care instructions adapted under license by [employer] (which disclaims liability or warranty for this information). If you have questions about a medical condition or this instruction, always ask your healthcare professional. [address] any warranty or liability for your use of this information. Benign Essential Tremor: Care Instructions  Your Care Instructions    Benign essential tremor is a medical term for shaking that you can't control. Your hand or fingers may shake when you lift a cup or point at something. Or your voice may shake when you speak. This type of tremor is not harmful. It is not caused by a stroke or Parkinson's disease. Some things can affect how much you shake.  For example, drinking or eating something with caffeine may make tremors worse for a while. Some medicines also can increase tremors. These include antidepressants and too much thyroid replacement. Talk to your doctor if you think one of your medicines makes your tremors worse. If you are self-conscious about your tremors, there are some things you can do to reduce them or make them less noticeable. This includes taking medicine. Follow-up care is a key part of your treatment and safety. Be sure to make and go to all appointments, and call your doctor if you are having problems. It's also a good idea to know your test results and keep a list of the medicines you take. How can you care for yourself at home? · Take your medicines exactly as prescribed. Call your doctor if you think you are having a problem with your medicine. Some medicines that help control tremors have to be taken every day, even if you are not having tremors. You will get more details on the specific medicines your doctor prescribes. · Get plenty of rest.  · Eat a balanced, healthy diet. · Try to reduce stress. Regular exercise and massages may help. · Limit alcohol. Heavy drinking can make your tremors worse. · Avoid drinks or foods with caffeine if they make your tremors worse. These include tea, cola, coffee, and chocolate. · Wear a heavy bracelet or watch. This adds a little weight to your hand. The extra weight may reduce tremors. · Drink from cups or glasses that are only half full. You may also want to try drinking with a straw. When should you call for help? Watch closely for changes in your health, and be sure to contact your doctor if:    · You notice your tremors are getting worse.     · You can't do your everyday activities because of your tremors.     · You are sad and embarrassed about your shaking. Where can you learn more? Go to http://mario-barry.info/.   Enter B746 in the search box to learn more about \"Benign Essential Tremor: Care Instructions. \"  Current as of: June 4, 2018  Content Version: 11.8  © 8563-6631 [employer]. Care instructions adapted under license by [employer] (which disclaims liability or warranty for this information). If you have questions about a medical condition or this instruction, always ask your healthcare professional. [facility] [address] any warranty or liability for your use of this information. High Cholesterol: Care Instructions  Your Care Instructions    Cholesterol is a type of fat in your blood. It is needed for many body functions, such as making new cells. Cholesterol is made by your body. It also comes from food you eat. High cholesterol means that you have too much of the fat in your blood. This raises your risk of a heart attack and stroke. LDL and HDL are part of your total cholesterol. LDL is the \"bad\" cholesterol. High LDL can raise your risk for heart disease, heart attack, and stroke. HDL is the \"good\" cholesterol. It helps clear bad cholesterol from the body. High HDL is linked with a lower risk of heart disease, heart attack, and stroke. Your cholesterol levels help your doctor find out your risk for having a heart attack or stroke. You and your doctor can talk about whether you need to lower your risk and what treatment is best for you. A heart-healthy lifestyle along with medicines can help lower your cholesterol and your risk. The way you choose to lower your risk will depend on how high your risk is for heart attack and stroke. It will also depend on how you feel about taking medicines. Follow-up care is a key part of your treatment and safety. Be sure to make and go to all appointments, and call your doctor if you are having problems. It's also a good idea to know your test results and keep a list of the medicines you take. How can you care for yourself at home? · Eat a variety of foods every day.  Good choices include fruits, vegetables, whole grains (like oatmeal), dried beans and peas, nuts and seeds, soy products (like tofu), and fat-free or low-fat dairy products. · Replace butter, margarine, and hydrogenated or partially hydrogenated oils with olive and canola oils. (Canola oil margarine without trans fat is fine.)  · Replace red meat with fish, poultry, and soy protein (like tofu). · Limit processed and packaged foods like chips, crackers, and cookies. · Bake, broil, or steam foods. Don't rodriguez them. · Be physically active. Get at least 30 minutes of exercise on most days of the week. Walking is a good choice. You also may want to do other activities, such as running, swimming, cycling, or playing tennis or team sports. · Stay at a healthy weight or lose weight by making the changes in eating and physical activity listed above. Losing just a small amount of weight, even 5 to 10 pounds, can reduce your risk for having a heart attack or stroke. · Do not smoke. When should you call for help? Watch closely for changes in your health, and be sure to contact your doctor if:    · You need help making lifestyle changes.     · You have questions about your medicine. Where can you learn more? Go to http://mario-barry.info/. Enter L869 in the search box to learn more about \"High Cholesterol: Care Instructions. \"  Current as of: December 6, 2017  Content Version: 11.8  © 9203-9075 [employer]. Care instructions adapted under license by [employer] (which disclaims liability or warranty for this information). If you have questions about a medical condition or this instruction, always ask your healthcare professional. [place] [address] any warranty or liability for your use of this information.

## 2019-01-02 NOTE — PROGRESS NOTES
NEUROLOGY CLINIC NOTE    Patient ID:  Celia Duque  [id number]  24 y.o.  1970    Date of Visit:  January 2, 2019    Reason for Visit: chronic headaches, tremors    Chief Complaint   Patient presents with    Follow-up       History of Present Illness:     Patient Active Problem List    Diagnosis Date Noted    Severe obesity (BMI 35.0-39. 9) with comorbidity ([address] 04/16/2018    Chronic insomnia 03/28/2017    Non morbid obesity 03/28/2017    Vitamin D deficiency 10/20/2015    Allergic rhinitis 06/14/2013    Hypercholesteremia 01/23/2012    Allergy 07/14/2011    Mild intermittent asthma without complication 10/16/7306    Migraines 07/14/2011    GERD (gastroesophageal reflux disease) 07/14/2011    Headache(784.0) 07/14/2011    Cerebral vascular malformation 07/14/2011     Past Medical History:   Diagnosis Date    Allergic rhinitis 6/14/2013    Asthma     Chronic insomnia 3/28/2017    GERD (gastroesophageal reflux disease)     Headache     Headache(784.0)     Hypercholesteremia 1/23/2012    Non morbid obesity 3/28/2017    Snoring     Vitamin D deficiency 10/20/2015      Past Surgical History:   Procedure Laterality Date    ENDOSCOPY, COLON, DIAGNOSTIC  12/03 & 04/09    HX GYN      Laproscopy, BTL,TVH    HX GYN      hysterectomy      Prior to Admission medications    Medication Sig Start Date End Date Taking? Authorizing Provider   traZODone (DESYREL) 100 mg tablet TAKE 1 TABLET BY MOUTH NIGHTLY 9/5/18  Yes Delfin Moreno MD   omeprazole (PRILOSEC) 40 mg capsule Take 1 Cap by mouth daily. 7/21/18  Yes Aruna Martines MD   topiramate (TOPAMAX) 50 mg tablet Take 1 Tab by mouth two (2) times a day.  6/11/18  Yes Karla Jang MD   butalbital-acetaminophen-caff (FIORICET) -40 mg per capsule Take 1 Cap by mouth every six (6) hours as needed for Headache. 6/11/18  Yes Karla Jang MD   propranolol (INDERAL) 10 mg tablet  1/22/18  Yes Provider, Historical cyclobenzaprine (FLEXERIL) 5 mg tablet Take 5 mg by mouth. Yes Provider, Historical   albuterol (PROVENTIL HFA, VENTOLIN HFA, PROAIR HFA) 90 mcg/actuation inhaler Take 2 Puffs by inhalation every four (4) hours as needed for Wheezing or Shortness of Breath. 12/13/17  Yes Ruddy Braun MD   albuterol-ipratropium (DUO-NEB) 2.5 mg-0.5 mg/3 ml nebu 3 mL by Nebulization route every four (4) hours as needed. 12/13/17  Yes Ruddy Braun MD   predniSONE (STERAPRED) 5 mg dose pack See administration instruction per 5mg dose pack 7/21/18   Gloria Newton MD   pravastatin (PRAVACHOL) 20 mg tablet TAKE 1 TABLET BY MOUTH EVERY NIGHT 5/15/18   Ruddy Braun MD   ZOLMitriptan (ZOMIG) 5 mg tablet TK 1 T PO 1 TIME PRF MIGRAINE HEADACHE. MAY REPEAT DOSE 1 TIME IN 2 H 2/19/18   Provider, Historical     Allergies   Allergen Reactions    Latex Rash    Aspirin Other (comments)     \"It doesn't digest in my system\"      Social History     Tobacco Use    Smoking status: Never Smoker    Smokeless tobacco: Never Used   Substance Use Topics    Alcohol use: Yes     Comment: Occasionally      Family History   Problem Relation Age of Onset    Hypertension Father     Kidney Disease Maternal Grandmother     Hypertension Maternal Grandmother     Heart Attack Paternal Grandfather     Hypertension Mother     Cancer Maternal Grandfather         lung        Subjective:      Iveth Reeder is a 53 y.o. RHAA female with history of chronic migraines, chronic tension headache and hyperlipidemia who is here to establish her care. Patient was a previous patient at [facility] [place] [address] and [address] [place] [address] neurology. Patient was being seen for headaches. Onset 15 years ago. Severity: moderate to severe. It occurs daily, has more than one hour duration and is unchanged. Location was bitemporal and occipital. There was radiation to the neck and shoulders. The patient describes it as pressure and ache. Timing of headache: upon awakening. Denies aggravating factors. Denies relieving factors. Associated symptoms include phonophobia. Pertinent negatives include blurred vision, family history of migraine, nausea, photophobia, vertigo and vomiting. Per patient condition started since 2002. Previously seen by neurosurgery who did a brain MRI last 3/27/2002 which showed a small venous angioma right occipital lobe. Acute onset of two types of headache. One that is pressure like. 9/10 at the back of her head (R>L) and the other bitemporal achy like that is 5/10 in intensity. It lasts for hours but she does have headache free periods during the day. Associated with slowed thought process and noise sensitivity. Brain MRI done with and without contrast 10/20/2011 which was unremarkable except for the persistent right occipital venous anomaly. Patient was diagnosed with occipital neuralgia, chronic tension-type headache and congenital anomaly of the cerebrovascular system. When patient was seen at [facility] on 5/9/2012 she reported compliance with her medication regimen and she was having significant benefit. She was not having any major headaches and rarely uses the Fioricet. She was taking Propranolol 10 mg BID and Amitriptyline 30 mg at bedtime. Amitriptyline was helping her sleep. She also was evaluated last 10/29/14 and 12/31/2014 for dizziness and vertigo. She was seen by Dr. Niki John (ENT) and was cleared. Patient was known to be none compliant with follow up visit when she is well. She was last seen at [facility] 5/28/2015, then she reported improvement of her headaches in the sense that they are less intense but it still occurs everyday. Patient has previous tried Ibuprofen and Naproxen without any relief. The Cambia helps abort it immediately. BUPAP helps relieve it after 15 to 30 minutes and she takes it everyday. Denies any side effects. The headaches however recurs. She is also sleeping better with the Temazepam 30 mg at bedtime.  Denies any sedating or cognitive side effects. She tries to correct her posture. She has been doing the neck and shoulder exercises. Plan was to continue her regimen. Since December 2017, patient reports worsening of her headache again. She apparently has been in and out of the [facility] from end of December 2017 to first week of January for respiratory issues with multiple prednisone treatments. The last 1/12/2018 patient was at the [facility] ER for severe headaches and underwent lumbar puncture. CSF studies were unremarkable. Elevated WBC (14.91). Since then, patient has been having abrupt onset when sitting or standing of bitemporal and bilateral occiput pain. Pressure like with neck stiffness. 8/10. Megan Ovalle takes the edge off it. Associated with hot flashes and slight blurring of vision. Resolves with laying down. Patient did go back to the ER at [facility]. She did get a blood patch. It helped improve the positional headache and neck stiffness. Patient underwent a brain MRI with and without contrast 1/29/2018 was normal. No abnormal enhancements. Since her last visit on 6/11/2018, labs done revealed unremarkable CBC and CMP except for elevated LDL at 117 and cholesterol at 202. Topiramate level was none detected. Patient reports sustained improvement of her headaches. It is less frequent and intense except for the past week due to increase stress. Headaches occur once a week. Fioricet as needed offers relief but has to take multiple doses. She did not like the side effect from Zomig. Last headache was this morning. She is taking Topiramate  50 mg BID. Reports some hand numbness/tingling discomforts. She has lost 24 pounds. She was also taking Propranolol 10 mg BID but ran out several weeks PTC. Her issues with intermittent hand tremors are better and not any worse.  .     Outside reports reviewed: none    Review of Systems:    A comprehensive review of systems was negative except for:   Gastrointestinal: positive for nausea, heartburn  Neurological: positive for headache    Objective:     Visit Vitals  /68   Pulse 97   Ht 5' 5\" (1.651 m)   Wt 184 lb 12.8 oz (83.8 kg)   SpO2 97%   BMI 30.75 kg/m²       PHYSICAL EXAM:    NEUROLOGICAL EXAM:    Appearance: The patient is overweight, provides a coherent history and is in no acute distress. Mental Status: Oriented to time, place and person. Fluent, no aphasia or dysarthria. Mood and affect appropriate. Cranial Nerves:   II - XII were intact. Motor:  5/5 strength. Normal bulk and tone. No fasciculations. Reflexes:   Deep tendon reflexes 2+/4 and symmetrical. Downgoing toes. Sensory:   Normal to cold, pinprick and vibration. Gait:  Normal gait. No Romberg. Tremor:   Mild posture L>R hand tremors noted. No cogwheel rigidity. Cerebellar:  Intact FTN/CASPER/HTS. Anterior head posture (2 FB) with shoulders rotated in       Assessment:   Chronic migraine intractable without aura - stable with exacerbation  Chronic tension type headache - stable with exacerbation  Essential tremors  - stable  Mixed hyperlipidemia    Plan:   1. Neurology exam is still non-focal. Recent brain MRI with and without contrast was normal. Responding to current medication. Trial of Trokendi  mg daily given side effects with Topiramate of paresthesia. Samples were provided. Check CBC, CMP and topiramate levels on her next visit. Cambia for abortive therapy. Prescription was provided. 2. Restart Propranolol 10 mg BID. Prescription was provided. Encouraged to continue to correct her anterior head posture. Do the neck and shoulder exercises taught before as well. Continue Fioricet for abortive therapy. 3. Exam reveals mainly postural L>R UE tremors. Not much intentional or resting hand tremors. No tone changes or cogwheel rigidity. No bradykinesia. No evidence at this time to support a diagnosis of Parkinson's disease. Findings consistent with essential tremors. Normal brain MRI. Current regimen of Propranolol and Topiramate are actually medications used to treat tremors. She understood. 4. Hyperlipidemia currently on dietary modification. Encourage to continue weight loss and dietary changes. All questions and concerns were answered.

## 2019-02-07 ENCOUNTER — TELEPHONE (OUTPATIENT)
Dept: NEUROLOGY | Age: 49
End: 2019-02-07

## 2019-02-07 DIAGNOSIS — G43.719 CHRONIC MIGRAINE WITHOUT AURA, INTRACTABLE, WITHOUT STATUS MIGRAINOSUS: ICD-10-CM

## 2019-02-07 RX ORDER — CIPROFLOXACIN 500 MG/1
TABLET ORAL
Refills: 0 | COMMUNITY
Start: 2019-02-04 | End: 2019-04-19

## 2019-02-07 RX ORDER — FLUCONAZOLE 150 MG/1
TABLET ORAL
Refills: 0 | COMMUNITY
Start: 2019-02-04 | End: 2019-04-19

## 2019-02-07 NOTE — TELEPHONE ENCOUNTER
Received call from patient, she stated that Dr. Ani Tolentino gave her a trial of Trokendi XR and told her call back to let himknow how she liked it.  She says that she likes the medication and would like for a rx to be sent to the [facility] on the corner on [address] [employer]      [phone]

## 2019-02-25 ENCOUNTER — HOSPITAL ENCOUNTER (OUTPATIENT)
Dept: ULTRASOUND IMAGING | Age: 49
Discharge: HOME | End: 2019-02-25
Attending: INTERNAL MEDICINE
Payer: COMMERCIAL

## 2019-02-25 DIAGNOSIS — R10.13 ABDOMINAL PAIN, EPIGASTRIC: ICD-10-CM

## 2019-02-25 DIAGNOSIS — K59.00 CONSTIPATION: ICD-10-CM

## 2019-02-25 PROCEDURE — 76700 US EXAM ABDOM COMPLETE: CPT

## 2019-03-05 ENCOUNTER — TELEPHONE (OUTPATIENT)
Dept: FAMILY MEDICINE CLINIC | Age: 49
End: 2019-03-05

## 2019-03-05 DIAGNOSIS — T78.40XA ALLERGIC STATE, INITIAL ENCOUNTER: Primary | ICD-10-CM

## 2019-03-05 NOTE — TELEPHONE ENCOUNTER
Pt states having a lot of allergy problems. She is  requesting Dr Rowdy Cruz prescribe her allergy medications, (allegra and flonase) so that her Insurance will pay for them. States they are too expensive over the counter. Requests prescription be sent to [place] at [facility].

## 2019-03-06 RX ORDER — MINERAL OIL
180 ENEMA (ML) RECTAL
Qty: 30 TAB | Refills: 11 | Status: SHIPPED | OUTPATIENT
Start: 2019-03-06 | End: 2020-03-24

## 2019-03-06 RX ORDER — FLUTICASONE PROPIONATE 50 MCG
2 SPRAY, SUSPENSION (ML) NASAL DAILY
Qty: 1 BOTTLE | Refills: 11 | Status: SHIPPED | OUTPATIENT
Start: 2019-03-06 | End: 2019-11-13 | Stop reason: SDUPTHER

## 2019-03-15 ENCOUNTER — HOSPITAL ENCOUNTER (OUTPATIENT)
Dept: MAMMOGRAPHY | Age: 49
Discharge: HOME | End: 2019-03-15
Attending: OBSTETRICS & GYNECOLOGY
Payer: COMMERCIAL

## 2019-03-15 DIAGNOSIS — Z12.39 BREAST SCREENING: ICD-10-CM

## 2019-03-15 PROCEDURE — 77067 SCR MAMMO BI INCL CAD: CPT

## 2019-04-19 ENCOUNTER — OFFICE VISIT (OUTPATIENT)
Dept: FAMILY MEDICINE CLINIC | Age: 49
End: 2019-04-19

## 2019-04-19 ENCOUNTER — TELEPHONE (OUTPATIENT)
Dept: FAMILY MEDICINE CLINIC | Age: 49
End: 2019-04-19

## 2019-04-19 VITALS
SYSTOLIC BLOOD PRESSURE: 120 MMHG | DIASTOLIC BLOOD PRESSURE: 69 MMHG | OXYGEN SATURATION: 97 % | HEART RATE: 71 BPM | WEIGHT: 183.6 LBS | RESPIRATION RATE: 16 BRPM | BODY MASS INDEX: 30.59 KG/M2 | HEIGHT: 65 IN | TEMPERATURE: 96.1 F

## 2019-04-19 DIAGNOSIS — R31.9 HEMATURIA, UNSPECIFIED TYPE: Primary | ICD-10-CM

## 2019-04-19 NOTE — PROGRESS NOTES
Michael Solo is a 53 y.o. female     Exam RM: 1     Chief Complaint   Patient presents with    Blood in Urine     Pt states a recent health screening resulted in +4 on a POC urinalysis. 1. Have you been to the ER, urgent care clinic since your last visit? Hospitalized since your last visit? No     2. Have you seen or consulted any other health care providers outside of the [address] [place] [address] since your last visit? Include any pap smears or colon screening.   No     Health Maintenance Due   Topic Date Due    Pneumococcal 0-64 years (1 of 1 - PPSV23) 10/09/1977    PAP AKA CERVICAL CYTOLOGY  10/09/1992       Visit Vitals  /69 (BP 1 Location: Left arm, BP Patient Position: Sitting)   Pulse 71   Temp 96.1 °F (35.6 °C) (Oral)   Resp 16   Ht 5' 5\" (1.651 m)   Wt 183 lb 9.6 oz (83.3 kg)   SpO2 97%   BMI 30.55 kg/m²

## 2019-04-19 NOTE — PROGRESS NOTES
HISTORY OF PRESENT ILLNESS  Sara Felton is a 53 y.o. female. 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  She needs clearance from me. The patient states she had a hysterectomy in 2007. Review of Systems   Constitutional: Negative for chills, fever and malaise/fatigue. Gastrointestinal: Negative for abdominal pain. Genitourinary: Negative for dysuria, flank pain, frequency, hematuria and urgency. Visit Vitals  /69 (BP 1 Location: Left arm, BP Patient Position: Sitting)   Pulse 71   Temp 96.1 °F (35.6 °C) (Oral)   Resp 16   Ht 5' 5\" (1.651 m)   Wt 183 lb 9.6 oz (83.3 kg)   SpO2 97%   BMI 30.55 kg/m²     Physical Exam   Constitutional: She is oriented to person, place, and time. She appears well-developed and well-nourished. No distress. Cardiovascular: Normal rate, regular rhythm and normal heart sounds. Exam reveals no gallop and no friction rub. No murmur heard. Pulmonary/Chest: Effort normal and breath sounds normal. No respiratory distress. She has no wheezes. She has no rales. Abdominal: Soft. Normal appearance and bowel sounds are normal. She exhibits no distension. There is no tenderness. There is no rebound, no guarding and no CVA tenderness. Neurological: She is alert and oriented to person, place, and time. Skin: Skin is warm and dry. She is not diaphoretic. Nursing note and vitals reviewed. ASSESSMENT and PLAN    ICD-10-CM ICD-9-CM    1. Hematuria, unspecified type R31.9 599.70 URINALYSIS W/MICROSCOPIC      METABOLIC PANEL, BASIC        Hematuria on urine dip  Labs per orders. Follow-up and Dispositions    · Return if symptoms worsen or fail to improve. Reviewed plan of care. Patient has provided input and agrees with goals.

## 2019-04-19 NOTE — TELEPHONE ENCOUNTER
DOT Medical Examiner, lVad Barry called stating he received forms back from Dr. Conner Barrier today with no lab results or diagnosis filled out,  was advised pt seen for appointment today, labs have been ordered but may not have results until Monday or Tuesday of next week if pt had them done today. Diagnosis listed was Hematuria, but was not on diagnosis, etiology section. Forms placed back in nurse's in basket pending lab results. Mr. Mark Abarca can be reached at [phone] if you have questions.  Letty

## 2019-04-20 LAB
APPEARANCE UR: CLEAR
BACTERIA #/AREA URNS HPF: NORMAL /[HPF]
BILIRUB UR QL STRIP: NEGATIVE
BUN SERPL-MCNC: 12 MG/DL (ref 6–24)
BUN/CREAT SERPL: 14 (ref 9–23)
CALCIUM SERPL-MCNC: 9.2 MG/DL (ref 8.7–10.2)
CASTS URNS QL MICRO: NORMAL /LPF
CHLORIDE SERPL-SCNC: 102 MMOL/L (ref 96–106)
CO2 SERPL-SCNC: 23 MMOL/L (ref 20–29)
COLOR UR: YELLOW
CREAT SERPL-MCNC: 0.88 MG/DL (ref 0.57–1)
EPI CELLS #/AREA URNS HPF: NORMAL /HPF (ref 0–10)
GLUCOSE SERPL-MCNC: 74 MG/DL (ref 65–99)
GLUCOSE UR QL: NEGATIVE
HGB UR QL STRIP: ABNORMAL
KETONES UR QL STRIP: NEGATIVE
LEUKOCYTE ESTERASE UR QL STRIP: NEGATIVE
MICRO URNS: ABNORMAL
MUCOUS THREADS URNS QL MICRO: PRESENT
NITRITE UR QL STRIP: NEGATIVE
PH UR STRIP: 5.5 [PH] (ref 5–7.5)
POTASSIUM SERPL-SCNC: 4.2 MMOL/L (ref 3.5–5.2)
PROT UR QL STRIP: NEGATIVE
RBC #/AREA URNS HPF: NORMAL /HPF (ref 0–2)
SODIUM SERPL-SCNC: 140 MMOL/L (ref 134–144)
SP GR UR: 1.01 (ref 1–1.03)
UROBILINOGEN UR STRIP-MCNC: 0.2 MG/DL (ref 0.2–1)
WBC #/AREA URNS HPF: NORMAL /HPF (ref 0–5)

## 2019-04-23 NOTE — TELEPHONE ENCOUNTER
Labs have been printed and diagnoses code added to paperwork. Paperwork refaxed and confirmation received.

## 2019-04-28 ENCOUNTER — TELEPHONE (OUTPATIENT)
Dept: FAMILY MEDICINE CLINIC | Age: 49
End: 2019-04-28

## 2019-04-28 DIAGNOSIS — R31.21 ASYMPTOMATIC MICROSCOPIC HEMATURIA: Primary | ICD-10-CM

## 2019-04-29 NOTE — TELEPHONE ENCOUNTER
Called and spoke with pt, and she has been advised and states understanding of results and referral.     Referral has been mailed to pt's home.

## 2019-04-29 NOTE — TELEPHONE ENCOUNTER
Please call patient and let her know her microscopic UA shows red blood cells.   This should not keep her from driving, but I do want her to see urology and have printed a referral request.

## 2019-05-31 ENCOUNTER — OFFICE VISIT (OUTPATIENT)
Dept: NEUROLOGY | Age: 49
End: 2019-05-31

## 2019-05-31 VITALS
SYSTOLIC BLOOD PRESSURE: 112 MMHG | BODY MASS INDEX: 29.66 KG/M2 | OXYGEN SATURATION: 100 % | HEIGHT: 65 IN | HEART RATE: 72 BPM | WEIGHT: 178 LBS | DIASTOLIC BLOOD PRESSURE: 74 MMHG

## 2019-05-31 DIAGNOSIS — G44.229 CHRONIC TENSION-TYPE HEADACHE, NOT INTRACTABLE: ICD-10-CM

## 2019-05-31 DIAGNOSIS — G43.719 CHRONIC MIGRAINE WITHOUT AURA, INTRACTABLE, WITHOUT STATUS MIGRAINOSUS: Primary | ICD-10-CM

## 2019-05-31 DIAGNOSIS — G25.0 ESSENTIAL TREMOR: ICD-10-CM

## 2019-05-31 DIAGNOSIS — M54.2 CERVICALGIA: ICD-10-CM

## 2019-05-31 RX ORDER — BUTALBITAL, ACETAMINOPHEN AND CAFFEINE 300; 40; 50 MG/1; MG/1; MG/1
1 CAPSULE ORAL
Qty: 60 CAP | Refills: 0 | Status: SHIPPED | OUTPATIENT
Start: 2019-05-31 | End: 2020-01-27 | Stop reason: SDUPTHER

## 2019-05-31 NOTE — PATIENT INSTRUCTIONS
A Healthy Lifestyle: Care Instructions  Your Care Instructions    A healthy lifestyle can help you feel good, stay at a healthy weight, and have plenty of energy for both work and play. A healthy lifestyle is something you can share with your whole family. A healthy lifestyle also can lower your risk for serious health problems, such as high blood pressure, heart disease, and diabetes. You can follow a few steps listed below to improve your health and the health of your family. Follow-up care is a key part of your treatment and safety. Be sure to make and go to all appointments, and call your doctor if you are having problems. It's also a good idea to know your test results and keep a list of the medicines you take. How can you care for yourself at home? · Do not eat too much sugar, fat, or fast foods. You can still have dessert and treats now and then. The goal is moderation. · Start small to improve your eating habits. Pay attention to portion sizes, drink less juice and soda pop, and eat more fruits and vegetables. ? Eat a healthy amount of food. A 3-ounce serving of meat, for example, is about the size of a deck of cards. Fill the rest of your plate with vegetables and whole grains. ? Limit the amount of soda and sports drinks you have every day. Drink more water when you are thirsty. ? Eat at least 5 servings of fruits and vegetables every day. It may seem like a lot, but it is not hard to reach this goal. A serving or helping is 1 piece of fruit, 1 cup of vegetables, or 2 cups of leafy, raw vegetables. Have an apple or some carrot sticks as an afternoon snack instead of a candy bar. Try to have fruits and/or vegetables at every meal.  · Make exercise part of your daily routine. You may want to start with simple activities, such as walking, bicycling, or slow swimming. Try to be active 30 to 60 minutes every day. You do not need to do all 30 to 60 minutes all at once.  For example, you can exercise 3 times a day for 10 or 20 minutes. Moderate exercise is safe for most people, but it is always a good idea to talk to your doctor before starting an exercise program.  · Keep moving. [place] Limb the lawn, work in the garden, or [employer]. Take the stairs instead of the elevator at work. · If you smoke, quit. People who smoke have an increased risk for heart attack, stroke, cancer, and other lung illnesses. Quitting is hard, but there are ways to boost your chance of quitting tobacco for good. ? Use nicotine gum, patches, or lozenges. ? Ask your doctor about stop-smoking programs and medicines. ? Keep trying. In addition to reducing your risk of diseases in the future, you will notice some benefits soon after you stop using tobacco. If you have shortness of breath or asthma symptoms, they will likely get better within a few weeks after you quit. · Limit how much alcohol you drink. Moderate amounts of alcohol (up to 2 drinks a day for men, 1 drink a day for women) are okay. But drinking too much can lead to liver problems, high blood pressure, and other health problems. Family health  If you have a family, there are many things you can do together to improve your health. · Eat meals together as a family as often as possible. · Eat healthy foods. This includes fruits, vegetables, lean meats and dairy, and whole grains. · Include your family in your fitness plan. Most people think of activities such as jogging or tennis as the way to fitness, but there are many ways you and your family can be more active. Anything that makes you breathe hard and gets your heart pumping is exercise. Here are some tips:  ? Walk to do errands or to take your child to school or the bus.  ? Go for a family bike ride after dinner instead of watching TV. Where can you learn more? Go to http://mario-barry.info/. Enter A221 in the search box to learn more about \"A Healthy Lifestyle: Care Instructions. \"  Current as of: September 11, 2018  Content Version: 11.9  © 6242-2644 [employer]. Care instructions adapted under license by [facility] (which disclaims liability or warranty for this information). If you have questions about a medical condition or this instruction, always ask your healthcare professional. [address] any warranty or liability for your use of this information. Neck: Exercises  Your Care Instructions  Here are some examples of typical rehabilitation exercises for your condition. Start each exercise slowly. Ease off the exercise if you start to have pain. Your doctor or physical therapist will tell you when you can start these exercises and which ones will work best for you. How to do the exercises  Neck stretch    1. This stretch works best if you keep your shoulder down as you lean away from it. To help you remember to do this, start by relaxing your shoulders and lightly holding on to your thighs or your chair. 2. Tilt your head toward your shoulder and hold for 15 to 30 seconds. Let the weight of your head stretch your muscles. 3. If you would like a little added stretch, use your hand to gently and steadily pull your head toward your shoulder. For example, keeping your right shoulder down, lean your head to the left. 4. Repeat 2 to 4 times toward each shoulder. Diagonal neck stretch    1. Turn your head slightly toward the direction you will be stretching, and tilt your head diagonally toward your chest and hold for 15 to 30 seconds. 2. If you would like a little added stretch, use your hand to gently and steadily pull your head forward on the diagonal.  3. Repeat 2 to 4 times toward each side. Dorsal glide stretch    1. Sit or stand tall and look straight ahead. 2. Slowly tuck your chin as you glide your head backward over your body  3. Hold for a count of 6, and then relax for up to 10 seconds. 4. Repeat 8 to 12 times.     Chest and shoulder stretch    1. Sit or stand tall and glide your head backward as in the dorsal glide stretch. 2. Raise both arms so that your hands are next to your ears. 3. Take a deep breath, and as you breathe out, lower your elbows down and behind your back. You will feel your shoulder blades slide down and together, and at the same time you will feel a stretch across your chest and the front of your shoulders. 4. Hold for about 6 seconds, and then relax for up to 10 seconds. 5. Repeat 8 to 12 times. Strengthening: Hands on head    1. Move your head backward, forward, and side to side against gentle pressure from your hands, holding each position for about 6 seconds. 2. Repeat 8 to 12 times. Follow-up care is a key part of your treatment and safety. Be sure to make and go to all appointments, and call your doctor if you are having problems. It's also a good idea to know your test results and keep a list of the medicines you take. Where can you learn more? Go to http://mario-barry.info/. Enter P975 in the search box to learn more about \"Neck: Exercises. \"  Current as of: September 20, 2018  Content Version: 11.9  © 4410-6177 [employer]. Care instructions adapted under license by [facility] (which disclaims liability or warranty for this information). If you have questions about a medical condition or this instruction, always ask your healthcare professional. [place] [address] any warranty or liability for your use of this information. Migraine Headache: Care Instructions  Your Care Instructions  Migraines are painful, throbbing headaches that often start on one side of the head. They may cause nausea and vomiting and make you sensitive to light, sound, or smell. Without treatment, migraines can last from 4 hours to a few days. Medicines can help prevent migraines or stop them after they have started.  Your doctor can help you find which ones work best for you. Follow-up care is a key part of your treatment and safety. Be sure to make and go to all appointments, and call your doctor if you are having problems. It's also a good idea to know your test results and keep a list of the medicines you take. How can you care for yourself at home? · Do not drive if you have taken a prescription pain medicine. · Rest in a quiet, dark room until your headache is gone. Close your eyes, and try to relax or go to sleep. Don't watch TV or read. · Put a cold, moist cloth or cold pack on the painful area for 10 to 20 minutes at a time. Put a thin cloth between the cold pack and your skin. · Use a warm, moist towel or a heating pad set on low to relax tight shoulder and neck muscles. · Have someone gently massage your neck and shoulders. · Take your medicines exactly as prescribed. Call your doctor if you think you are having a problem with your medicine. You will get more details on the specific medicines your doctor prescribes. · Be careful not to take pain medicine more often than the instructions allow. You could get worse or more frequent headaches when the medicine wears off. To prevent migraines  · Keep a headache diary so you can figure out what triggers your headaches. Avoiding triggers may help you prevent headaches. Record when each headache began, how long it lasted, and what the pain was like. (Was it throbbing, aching, stabbing, or dull?) Write down any other symptoms you had with the headache, such as nausea, flashing lights or dark spots, or sensitivity to bright light or loud noise. Note if the headache occurred near your period. List anything that might have triggered the headache. Triggers may include certain foods (chocolate, cheese, wine) or odors, smoke, bright light, stress, or lack of sleep. · If your doctor has prescribed medicine for your migraines, take it as directed.  You may have medicine that you take only when you get a migraine and medicine that you take all the time to help prevent migraines. ? If your doctor has prescribed medicine for when you get a headache, take it at the first sign of a migraine, unless your doctor has given you other instructions. ? If your doctor has prescribed medicine to prevent migraines, take it exactly as prescribed. Call your doctor if you think you are having a problem with your medicine. · Find healthy ways to deal with stress. Migraines are most common during or right after stressful times. Take time to relax before and after you do something that has caused a migraine in the past.  · Try to keep your muscles relaxed by keeping good posture. Check your jaw, face, neck, and shoulder muscles for tension. Try to relax them. When you sit at a desk, change positions often. And make sure to stretch for 30 seconds each hour. · Get plenty of sleep and exercise. · Eat meals on a regular schedule. Avoid foods and drinks that often trigger migraines. These include chocolate, alcohol (especially red wine and port), aspartame, monosodium glutamate (MSG), and some additives found in foods (such as hot dogs, bronson, cold cuts, aged cheeses, and pickled foods). · Limit caffeine. Don't drink too much coffee, tea, or soda. But don't quit caffeine suddenly. That can also give you migraines. · Do not smoke or allow others to smoke around you. If you need help quitting, talk to your doctor about stop-smoking programs and medicines. These can increase your chances of quitting for good. · If you are taking birth control pills or hormone therapy, talk to your doctor about whether they are triggering your migraines. When should you call for help? Call 911 anytime you think you may need emergency care. For example, call if:    · You have signs of a stroke. These may include:  ? Sudden numbness, paralysis, or weakness in your face, arm, or leg, especially on only one side of your body.   ? Sudden vision changes. ? Sudden trouble speaking. ? Sudden confusion or trouble understanding simple statements. ? Sudden problems with walking or balance. ? A sudden, severe headache that is different from past headaches.    Call your doctor now or seek immediate medical care if:    · You have new or worse nausea and vomiting.     · You have a new or higher fever.     · Your headache gets much worse.    Watch closely for changes in your health, and be sure to contact your doctor if:    · You are not getting better after 2 days (48 hours). Where can you learn more? Go to http://mario-barry.info/. Enter F319 in the search box to learn more about \"Migraine Headache: Care Instructions. \"  Current as of: Sandi 3, 2018  Content Version: 11.9  © 0061-7040 [employer]. Care instructions adapted under license by [employer] (which disclaims liability or warranty for this information). If you have questions about a medical condition or this instruction, always ask your healthcare professional. [place] [address] any warranty or liability for your use of this information. Shoulder Blade: Exercises  Your Care Instructions  Here are some examples of typical exercises for your condition. Start each exercise slowly. Ease off the exercise if you start to have pain. Your doctor or physical therapist will tell you when you can start these exercises and which ones will work best for you. How to do the exercises  Shoulder roll    1. Stand tall with your chin slightly tucked. Imagine that a string at the top of your head is pulling you straight up. 2. Keep your arms relaxed. All motion will be in your shoulders. 3. Shrug your shoulders up toward your ears, then up and back. [place] your shoulders down and back, like you're sliding your hands down into your back pants pockets. 4. Repeat the circles at least 2 to 4 times.   5. This exercise is also helpful anytime you want to relax. Lower neck and upper back stretch    1. With your arms about shoulder height, clasp your hands in front of you. 2. Drop your chin toward your chest.  3. Reach straight forward so you are rounding your upper back. Think about pulling your shoulder blades apart. [place] feel a stretch across your upper back and shoulders. Hold for at least 6 seconds. 4. Repeat 2 to 4 times. Triceps stretch    1. Reach your arm straight up. 2. Keeping your elbow in place, bend your arm and reach your hand down behind your back. 3. With your other hand, apply gentle pressure to the bent elbow. [place] feel a stretch at the back of your upper arm and shoulder. Hold about 6 seconds. 4. Repeat 2 to 4 times with each arm. Shoulder stretch    1. Relax your shoulders. 2. Raise one arm to shoulder height, and reach it across your chest.  3. Pull the arm slightly toward you with your other arm. This will help you get a gentle stretch. Hold for about 6 seconds. 4. Repeat 2 to 4 times. Shoulder blade squeeze    1. Sit or stand up tall with your arms at your sides. 2. Keep your shoulders relaxed and down, not shrugged. 3. Squeeze your shoulder blades together. Hold for 6 seconds, then relax. 4. Repeat 8 to 12 times. Straight-arm shoulder blade squeeze    1. Sit or stand tall. Relax your shoulders. 2. With palms down, hold your elastic tubing or band straight out in front of you. 3. Start with slight tension in the tubing or band, with your hands about shoulder-width apart. 4. Slowly pull straight out to the sides, squeezing your shoulder blades together. Keep your arms straight and at shoulder height. Slowly release. 5. Repeat 8 to 12 times. Rowing    1. [place] your elastic tubing or band at about waist height. Take one end in each hand. 2. Sit or stand with your feet hip-width apart. 3. Hold your arms straight in front of you. Adjust your distance to create slight tension in the tubing or band.   4. Slightly tuck your chin. Relax your shoulders. 5. Without shrugging your shoulders, pull straight back. Your elbows will pass alongside your waist.    Pull-downs    1. [place] your elastic tubing or band in the top of a closed door. Take one end in each hand. 2. Either sit or stand, depending on what is more comfortable. If you feel unsteady, sit on a chair. 3. Start with your arms up and comfortably apart, elbows straight. There should be a slight tension in the tubing or band. 4. Slightly tuck your chin, and look straight ahead. 5. Keeping your back straight, slowly pull down and back, bending your elbows. 6. Stop where your hands are level with your chin, in a \"goalpost\" position. 7. Repeat 8 to 12 times. Chest T stretch    1. Lie on your back. Raise your knees so they are bent. Plant your feet on the floor, hip-width apart. 2. Tuck your chin, and relax your shoulders. 3. Reach your arms straight out to the sides. If you don't feel a mild stretch in your shoulders and across your chest, use a foam roll or a tightly rolled blanket under your spine, from your tailbone to your head. 4. Relax in this position for at least 15 to 30 seconds while you breathe normally. Repeat 2 to 4 times. 5. As you get used to this stretch, keep adding a little more time until you are able relax in this position for 2 or 3 minutes. When you can relax for at least 2 minutes, you only need to do the exercise 1 time per session. Chest goalpost stretch    1. Lie on your back. Raise your knees so they are bent. Plant your feet on the floor, hip-width apart. 2. Tuck your chin, and relax your shoulders. 3. Reach your arms straight out to the sides. 4. Bend your arms at the elbows, with your hands pointed toward the top of your head. Your arms should make an L on either side of your head. Your palms should be facing up.   5. If you don't feel a mild stretch in your shoulders and across your chest, use a foam roll or tightly rolled blanket under your spine, from your tailbone to your head. 6. Relax in this position for at least 15 to 30 seconds while you breathe normally. Repeat 2 to 4 times. 7. Each day you do this exercise, add a little more time until you can relax in this position for 2 or 3 minutes. When you can relax for at least 2 minutes, you only need to do the exercise 1 time per session. Follow-up care is a key part of your treatment and safety. Be sure to make and go to all appointments, and call your doctor if you are having problems. It's also a good idea to know your test results and keep a list of the medicines you take. Where can you learn more? Go to http://mario-barry.info/. Enter [phone] in the search box to learn more about \"Shoulder Blade: Exercises. \"  Current as of: September 20, 2018  Content Version: 11.9  © 4456-1193 [employer]. Care instructions adapted under license by [employer] (which disclaims liability or warranty for this information). If you have questions about a medical condition or this instruction, always ask your healthcare professional. [place] [address] any warranty or liability for your use of this information.

## 2019-05-31 NOTE — PROGRESS NOTES
NEUROLOGY CLINIC NOTE    Patient ID:  Javier Florentino  [id number]  22 y.o.  1970    Date of Visit:  May 31, 2019    Reason for Visit: chronic headaches, neck pain, tremors    Chief Complaint   Patient presents with    Follow-up    Headache       History of Present Illness:     Patient Active Problem List    Diagnosis Date Noted    Severe obesity (BMI 35.0-39. 9) with comorbidity ([address] 04/16/2018    Chronic insomnia 03/28/2017    Non morbid obesity 03/28/2017    Vitamin D deficiency 10/20/2015    Allergic rhinitis 06/14/2013    Hypercholesteremia 01/23/2012    Allergy 07/14/2011    Mild intermittent asthma without complication 07/76/3494    Migraines 07/14/2011    GERD (gastroesophageal reflux disease) 07/14/2011    Headache(784.0) 07/14/2011    Cerebral vascular malformation 07/14/2011     Past Medical History:   Diagnosis Date    Allergic rhinitis 6/14/2013    Asthma     Chronic insomnia 3/28/2017    GERD (gastroesophageal reflux disease)     Headache     Headache(784.0)     Hypercholesteremia 1/23/2012    Non morbid obesity 3/28/2017    Snoring     Vitamin D deficiency 10/20/2015      Past Surgical History:   Procedure Laterality Date    ENDOSCOPY, COLON, DIAGNOSTIC  12/03 & 04/09    HX GYN      Laproscopy, BTL,TVH    HX GYN      hysterectomy    HX HYSTERECTOMY        Prior to Admission medications    Medication Sig Start Date End Date Taking? Authorizing Provider   fexofenadine (ALLEGRA) 180 mg tablet Take 1 Tab by mouth daily as needed for Allergies. 3/6/19   Annika Gray MD   fluticasone (FLONASE) 50 mcg/actuation nasal spray 2 Sprays by Both Nostrils route daily. 3/6/19   Annika Gray MD   topiramate ER (TROKENDI XR) 100 mg capsule Take 1 Cap by mouth daily. 2/7/19   Abner Finney MD   valACYclovir (VALTREX) 500 mg tablet TK 1 T PO  QD 12/23/18   Provider, Historical   propranolol (INDERAL) 10 mg tablet Take 1 Tab by mouth two (2) times a day.  1/2/19 Ralf Randolph MD   traZODone (DESYREL) 100 mg tablet TAKE 1 TABLET BY MOUTH NIGHTLY 9/5/18   Harpreet Chawla MD   omeprazole (PRILOSEC) 40 mg capsule Take 1 Cap by mouth daily. 7/21/18   Oziel Adams MD   butalbital-acetaminophen-caff (FIORICET) -40 mg per capsule Take 1 Cap by mouth every six (6) hours as needed for Headache. 6/11/18   Ralf Randolph MD   albuterol (PROVENTIL HFA, VENTOLIN HFA, PROAIR HFA) 90 mcg/actuation inhaler Take 2 Puffs by inhalation every four (4) hours as needed for Wheezing or Shortness of Breath. 12/13/17   Harpreet Chawla MD   albuterol-ipratropium (DUO-NEB) 2.5 mg-0.5 mg/3 ml nebu 3 mL by Nebulization route every four (4) hours as needed. 12/13/17   Harpreet Chawla MD     Allergies   Allergen Reactions    Latex Rash    Aspirin Other (comments)     \"It doesn't digest in my system\"      Social History     Tobacco Use    Smoking status: Never Smoker    Smokeless tobacco: Never Used   Substance Use Topics    Alcohol use: Yes     Comment: Occasionally      Family History   Problem Relation Age of Onset    Hypertension Father     Kidney Disease Maternal Grandmother     Hypertension Maternal Grandmother     Heart Attack Paternal Grandfather     Hypertension Mother     Cancer Maternal Grandfather         lung        Subjective:      Rome Laws is a 53 y.o. RHAA female with history of chronic migraines, chronic tension headache and hyperlipidemia who is here to establish her care. Patient was a previous patient at [facility] [place] [address] and [facility] neurology. Patient was being seen for headaches. Onset 15 years ago. Severity: moderate to severe. It occurs daily, has more than one hour duration and is unchanged. Location was bitemporal and occipital. There was radiation to the neck and shoulders. The patient describes it as pressure and ache. Timing of headache: upon awakening. Denies aggravating factors. Denies relieving factors.  Associated symptoms include phonophobia. Pertinent negatives include blurred vision, family history of migraine, nausea, photophobia, vertigo and vomiting. Per patient condition started since 2002. Previously seen by neurosurgery who did a brain MRI last 3/27/2002 which showed a small venous angioma right occipital lobe. Acute onset of two types of headache. One that is pressure like. 9/10 at the back of her head (R>L) and the other bitemporal achy like that is 5/10 in intensity. It lasts for hours but she does have headache free periods during the day. Associated with slowed thought process and noise sensitivity. Brain MRI done with and without contrast 10/20/2011 which was unremarkable except for the persistent right occipital venous anomaly. Patient was diagnosed with occipital neuralgia, chronic tension-type headache and congenital anomaly of the cerebrovascular system. When patient was seen at [facility] on 5/9/2012 she reported compliance with her medication regimen and she was having significant benefit. She was not having any major headaches and rarely uses the Fioricet. She was taking Propranolol 10 mg BID and Amitriptyline 30 mg at bedtime. Amitriptyline was helping her sleep. She also was evaluated last 10/29/14 and 12/31/2014 for dizziness and vertigo. She was seen by Dr. Eleonora Kwok (ENT) and was cleared. Patient was known to be none compliant with follow up visit when she is well. She was last seen at [facility] 5/28/2015, then she reported improvement of her headaches in the sense that they are less intense but it still occurs everyday. Patient has previous tried Ibuprofen and Naproxen without any relief. The Cambia helps abort it immediately. BUPAP helps relieve it after 15 to 30 minutes and she takes it everyday. Denies any side effects. The headaches however recurs. She is also sleeping better with the Temazepam 30 mg at bedtime. Denies any sedating or cognitive side effects. She tries to correct her posture.  She has been doing the neck and shoulder exercises. Plan was to continue her regimen. Since December 2017, patient reports worsening of her headache again. She apparently has been in and out of the hospital Carleen Mathew) from end of December 2017 to first week of January for respiratory issues with multiple prednisone treatments. The last 1/12/2018 patient was at the [facility] ER for severe headaches and underwent lumbar puncture. CSF studies were unremarkable. Elevated WBC (14.91). Since then, patient has been having abrupt onset when sitting or standing of bitemporal and bilateral occiput pain. Pressure like with neck stiffness. 8/10. Gena Abdi takes the edge off it. Associated with hot flashes and slight blurring of vision. Resolves with laying down. Patient did go back to the ER at [facility]. She did get a blood patch. It helped improve the positional headache and neck stiffness. Patient underwent a brain MRI with and without contrast 1/29/2018 was normal. No abnormal enhancements. Since her last visit on 1/2/2019, patient reports that she was doing fine from a headache standpoint until 2 months PTC when the frequency worsened again. She is back to having almost daily headaches. She also notes episodes of shooting pains from her neck that radiates down to her shoulders with head movements. She is taking Trokendi  mg daily and Propranolol 10 mg BID. Denies any side effects. Fioricet and Cambia offers relief as abortive therapy for her headaches. Her issues with intermittent hand tremors continue to be better and not any worse. Last bad headache was this morning. Outside reports reviewed: none    Review of Systems:    A comprehensive review of systems was negative except for:   Neck pain and headaches    Objective:     Visit Vitals  /74   Pulse 72   Ht 5' 5\" (1.651 m)   Wt 178 lb (80.7 kg)   SpO2 100%   BMI 29.62 kg/m²     3/10 headache    PHYSICAL EXAM:    NEUROLOGICAL EXAM:    Appearance:   The patient is overweight, provides a coherent history and is in no acute distress. Mental Status: Oriented to time, place and person. Fluent, no aphasia or dysarthria. Mood and affect appropriate. Cranial Nerves:   II - XII were intact. Motor:  5/5 strength. Normal bulk and tone. No fasciculations. Reflexes:   Deep tendon reflexes 2+/4 and symmetrical. Downgoing toes. Sensory:   Normal to cold, pinprick and vibration. Gait:  Normal gait. No Romberg. Tremor:   Very mild posture L>R hand tremors noted. No cogwheel rigidity. Cerebellar:  Intact FTN/CASPER/HTS. Anterior head posture (2 FB) with shoulders rotated in       Assessment:   Chronic migraine intractable without aura - worsening  Chronic tension type headache - worsening  Essential tremors  - stable  Cervicalgia - worsening    Plan:   1. Neurology exam is unchanged It is still non-focal. Previous brain MRI with and without contrast was normal. Trial of increase dose of Trokendi XR to a total of 125 mg daily x 7 days, then 150 mg daily x 7 days, then 200 mg daily. Samples were provided. Prescription to follow if response is seen. Continue Cambia for abortive therapy. Prescription was provided. 2. Continue Propranolol 10 mg BID. May increase if above changes do not help. Continue Fioricet for abortive therapy. Prescription was provided. 3. Exam reveals very mild postural L>R UE tremors. Not much intentional or resting hand tremors. No tone changes or cogwheel rigidity. No bradykinesia. No evidence at this time to support a diagnosis of Parkinson's disease. Findings consistent with essential tremors. Normal brain MRI. Current regimen of Propranolol and Topiramate are actually medications used to treat tremors. 4. Neck pain due to poor anterior head posture. Encouraged to continue to correct her anterior head posture. Do the neck and shoulder exercises taught before as well.   Patient referred to physical therapy for more aggressive manipulation including dry needling. Potential trial of muscle relaxants. All questions and concerns were answered.

## 2019-05-31 NOTE — LETTER
5/31/19    Patient: Jovita Nicole   YOB: 1970   Date of Visit: 5/31/2019     Tania Paul MD  [address]  [address]  VIA In [facility] [address]       Dear Tania Paul MD,      Thank you for referring Ms. Peyton Nicole to [facility] for evaluation. My notes for this consultation are attached. If you have questions, please do not hesitate to call me. I look forward to following your patient along with you.       Sincerely,    Marielos Hamilton MD

## 2019-06-03 ENCOUNTER — DOCUMENTATION ONLY (OUTPATIENT)
Dept: NEUROLOGY | Age: 49
End: 2019-06-03

## 2019-07-05 DIAGNOSIS — G43.719 CHRONIC MIGRAINE WITHOUT AURA, INTRACTABLE, WITHOUT STATUS MIGRAINOSUS: ICD-10-CM

## 2019-08-04 ENCOUNTER — APPOINTMENT (OUTPATIENT)
Dept: VASCULAR SURGERY | Age: 49
End: 2019-08-04
Attending: NURSE PRACTITIONER
Payer: COMMERCIAL

## 2019-08-04 ENCOUNTER — HOSPITAL ENCOUNTER (EMERGENCY)
Age: 49
Discharge: HOME | End: 2019-08-04
Attending: EMERGENCY MEDICINE
Payer: COMMERCIAL

## 2019-08-04 ENCOUNTER — APPOINTMENT (OUTPATIENT)
Dept: GENERAL RADIOLOGY | Age: 49
End: 2019-08-04
Attending: NURSE PRACTITIONER
Payer: COMMERCIAL

## 2019-08-04 VITALS
DIASTOLIC BLOOD PRESSURE: 78 MMHG | SYSTOLIC BLOOD PRESSURE: 127 MMHG | HEART RATE: 61 BPM | OXYGEN SATURATION: 97 % | RESPIRATION RATE: 16 BRPM | TEMPERATURE: 98.1 F

## 2019-08-04 DIAGNOSIS — M25.572 ACUTE LEFT ANKLE PAIN: ICD-10-CM
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
## 2019-08-04 DIAGNOSIS — M79.89 LEFT LEG SWELLING: ICD-10-CM

## 2019-08-04 DIAGNOSIS — M25.562 ACUTE PAIN OF LEFT KNEE: Primary | ICD-10-CM

## 2019-08-04 PROCEDURE — 73610 X-RAY EXAM OF ANKLE: CPT

## 2019-08-04 PROCEDURE — 99283 EMERGENCY DEPT VISIT LOW MDM: CPT

## 2019-08-04 PROCEDURE — 93971 EXTREMITY STUDY: CPT

## 2019-08-04 PROCEDURE — 73630 X-RAY EXAM OF FOOT: CPT

## 2019-08-04 PROCEDURE — 73562 X-RAY EXAM OF KNEE 3: CPT

## 2019-08-04 PROCEDURE — L1930 AFO PLASTIC: HCPCS

## 2019-08-04 RX ORDER — IBUPROFEN 600 MG/1
600 TABLET ORAL
Qty: 20 TAB | Refills: 0 | Status: SHIPPED | OUTPATIENT
Start: 2019-08-04 | End: 2021-01-13

## 2019-08-04 RX ORDER — IBUPROFEN 600 MG/1
600 TABLET ORAL
Qty: 20 TAB | Refills: 0 | Status: SHIPPED | OUTPATIENT
Start: 2019-08-04 | End: 2019-08-04

## 2019-08-04 NOTE — ED TRIAGE NOTES
Patient arrives for left knee and ankle pain that occurred last Sunday while on vacation.      + swelling noted

## 2019-08-04 NOTE — DISCHARGE INSTRUCTIONS
Thank you for allowing us to care for you today. Please follow-up with your Primary Care provider in the next 2-3 days if your symptoms do not improve. Plan for home:     Ibuprofen 600 mg every 6-8 hours for pain. Use on a schedule for the next 3 days. Do not take the ibuprofen if you need to take the [facility] for a headache. Air cast to the left ankle and ACE wrap to the left knee. Follow-up with Orthopedics. You may pick one you have seen before. There is a recommendation listed below. RICE therapy:  \"R\" is for rest. Please rest the injured area  \"I\" is for ice. Please ice the area and 20 minute increments multiple times daily. Make sure you have at least 20 minutes between each ice application. Do not place ice directly on the skin as it can cause frostbite. \"C\" is for compression. You may use a light Ace wrap to compress the area to help the swelling. Make sure you have no numbness and tingling past the Ace wrap. \"E\" is for elevate. Keep the swollen area elevated as much as possible. Elevate above the heart whenever possible. You should use ice or heat for your injury and pain. You may use one or the other or alternate between the two. If you use ice: apply the ice pack in 20 minute intervals. 20 minutes on then 20 minutes off. Make sure to protect the skin to prevent frost bite. Never apply ice or a plastic bag with ice directly to the skin. If you use heat: Do NOT lay or sleep on a heating pad. You will burn your skin. Instead, use microwave style heat packs or Thermacare packs. These are safer than traditional heating pads. Monitor your skin to prevent burns.        [facility] [place]:   Appointments:   [facility]  Appointments: [phone]    Orthopedics:   Appointments:   Phone: [phone]  Fax: [phone] On Call  Ortho On Call  Noris Villatoro On Call  Ortho On Call     [facility] On Call  Ortho On Call   [address] [facility] [address] [facility], [address] [address] [facility] [address]  Physician Office and Physical Therapy    Phone: [phone]  Physicians Fax: [phone]  Physical Therapy Fax: [phone] [place]:   [address] [facility], [address],  [place], [address] [facility])  Physician Office  Physical Therapy    [facility]  Physician Office  Physical Therapy  Office [address] [facility]  Physician Office  Physical Therapy  Hand Therapy [place]  In [address]  [address]  [facility], [address] [facility]    [address] Physician Office  [address] Physical Therapy  Phone: [phone]  Physicians Fax: [phone]  Physical Therapy Fax: [phone]   [place]:  In the [place] off [facility].   [address] [facility], [address] [facility],  [place], [address] [facility]  Physician Office  Physical Therapy    Kelley Hardwick  Physician Office  Physical Therapy    [facility]  Physician Office  Physical Therapy   [address] [facility] Office [address]    [address]  [address] [facility]  [address] Physical Therapy    Phone: [phone]  Physicians Suite Fax: [phone]  Physical Therapy Suite Fax: [phone] Location:  [address] [place] [address],[address]., [facility] [address]y Chawla [address]

## 2019-08-04 NOTE — ED NOTES
Discharge instructions given to patient by NP and RN. Pt has been given counseling on medication use and verbalizes understanding. IV d/c. Pt ambulated off of unit in no signs of distress.

## 2019-08-04 NOTE — ED PROVIDER NOTES
Initial Complaint: Knee & ankle pain    Started: 1 weeks ago    Endorses: twisted left knee and ankle while in [place] when she caught her foot on an object on the ground. Toes are \"swollen and doen't feel right\". Swelling is worse at the end of the day. Has been icing the knee. Requesting x-rays. Last took Cambia one week ago (Diclofenac for HA)  Denies: pain with ambulation    Made better: laying down, elevating leg  Made worse: standing, walking    No further complaints. Past Medical History:  6/14/2013: Allergic rhinitis  No date: Asthma  3/28/2017: Chronic insomnia  No date: GERD (gastroesophageal reflux disease)  No date: Headache  No date: Headache(784.0)  1/23/2012: Hypercholesteremia  3/28/2017: Non morbid obesity  No date: Snoring  10/20/2015: Vitamin D deficiency  Past Surgical History:  12/03 & 04/09: ENDOSCOPY, COLON, DIAGNOSTIC  No date: HX GYN      Comment:  Laproscopy, BTL,TVH  No date: HX GYN      Comment:  hysterectomy  No date: HX HYSTERECTOMY  Reviewed      Primary care provider: Trace Collier MD      The history is provided by the patient. No  was used.       Past Medical History:   Diagnosis Date    Allergic rhinitis 6/14/2013    Asthma     Chronic insomnia 3/28/2017    GERD (gastroesophageal reflux disease)     Headache     Headache(784.0)     Hypercholesteremia 1/23/2012    Non morbid obesity 3/28/2017    Snoring     Vitamin D deficiency 10/20/2015     Past Surgical History:   Procedure Laterality Date    ENDOSCOPY, COLON, DIAGNOSTIC  12/03 & 04/09    HX GYN      Laproscopy, BTL,TVH    HX GYN      hysterectomy    HX HYSTERECTOMY         Family History:   Problem Relation Age of Onset    Hypertension Father     Kidney Disease Maternal Grandmother     Hypertension Maternal Grandmother     Heart Attack Paternal Grandfather     Hypertension Mother     Cancer Maternal Grandfather         lung     Social History     Socioeconomic History    Marital status:      Spouse name: Not on file    Number of children: Not on file    Years of education: Not on file    Highest education level: Not on file   Occupational History    Not on file   Social Needs    Financial resource strain: Not on file    Food insecurity:     Worry: Not on file     Inability: Not on file    Transportation needs:     Medical: Not on file     Non-medical: Not on file   Tobacco Use    Smoking status: Never Smoker    Smokeless tobacco: Never Used   Substance and Sexual Activity    Alcohol use: Yes     Comment: Occasionally    Drug use: No    Sexual activity: Yes     Partners: Male     Birth control/protection: None   Lifestyle    Physical activity:     Days per week: Not on file     Minutes per session: Not on file    Stress: Not on file   Relationships    Social connections:     Talks on phone: Not on file     Gets together: Not on file     Attends Rastafarian service: Not on file     Active member of club or organization: Not on file     Attends meetings of clubs or organizations: Not on file     Relationship status: Not on file    Intimate partner violence:     Fear of current or ex partner: Not on file     Emotionally abused: Not on file     Physically abused: Not on file     Forced sexual activity: Not on file   Other Topics Concern    Not on file   Social History Narrative    Not on file     ALLERGIES: Latex and Aspirin    Review of Systems   Constitutional: Negative for chills and fever. Respiratory: Negative for cough and shortness of breath. Denies hemoptysis   Cardiovascular: Positive for leg swelling. Negative for chest pain. Gastrointestinal: Negative for nausea and vomiting. Musculoskeletal: Positive for arthralgias, gait problem, joint swelling and myalgias. All other systems reviewed and are negative.     Vitals:    08/04/19 1233 08/04/19 1252   BP:  132/81   Pulse: 78 94   Resp:  18   Temp:  97.9 °F (36.6 °C)   SpO2: 100% 100% Physical Exam   Constitutional: She appears well-developed and well-nourished. HENT:   Head: Atraumatic. Eyes: EOM are normal.   Neck: No tracheal deviation present. Cardiovascular:   Pulses:       Dorsalis pedis pulses are 2+ on the right side, and 2+ on the left side. Posterior tibial pulses are 2+ on the right side, and 2+ on the left side. +2 bilateral lower extremity pitting edema, below the knees   Pulmonary/Chest: Effort normal. No respiratory distress. Musculoskeletal:        Left knee: She exhibits swelling (Global). She exhibits no effusion, no ecchymosis, no deformity, no laceration and no erythema. Tenderness (Global and posterior) found. Left ankle: She exhibits decreased range of motion and swelling. She exhibits no ecchymosis. Tenderness. Medial malleolus tenderness found. Achilles tendon exhibits normal Guo's test results. Left foot: There is decreased range of motion, tenderness (Global) and swelling. Calf pain with passive stretch of the left calf. Neurological: She is alert. Skin: Skin is warm and dry. Psychiatric: She has a normal mood and affect. Her behavior is normal. Judgment and thought content normal.   Nursing note and vitals reviewed. MDM       Procedures      Assessment & Plan:     Orders Placed This Encounter    XR KNEE LT 3 V    XR ANKLE LT MIN 3 V    XR FOOT LT MIN 3 V     Duplex LLE    PERC score 2. Wells score 3    Discussed with Hyacinth Quinn DO,ED Provider    Tuan Montano NP  08/04/19  12:56 PM      No DVT on duplex of the left lower leg. No further work-up needed for PE. Small effusion to the left knee. Swelling to the left ankle. Will provide Aircast for the left ankle and Ace wrap for the left knee. Patient has declined crutches. Ortho follow-up if swelling persist.  Ice and heat as well as rest.  Discussed return precautions. 4:28 PM  Patient re-evaluated. All questions answered.   Patient appropriate for discharge. Given return precautions and follow up instructions. LABORATORY TESTS:  Labs Reviewed - No data to display    IMAGING RESULTS:  XR KNEE LT 3 V   Final Result   IMPRESSION: No fracture or dislocation. Suspect small effusion. Mild   patellofemoral osteoarthritis. Darlin Vanessa XR ANKLE LT MIN 3 V   Final Result   IMPRESSION: No acute abnormality. XR FOOT LT MIN 3 V   Final Result   IMPRESSION: No acute abnormality. MEDICATIONS GIVEN:  Medications - No data to display    IMPRESSION:  1. Acute pain of left knee    2. Acute left ankle pain    3. Left leg swelling    4. Fall, initial encounter        PLAN:  1. Current Discharge Medication List      START taking these medications    Details   ibuprofen (MOTRIN) 600 mg tablet Take 1 Tab by mouth every six (6) hours as needed for Pain. Do not take if you have taken [place] in the last 12 hours  Qty: 20 Tab, Refills: 0           2. Follow-up Information     Follow up With Specialties Details Why Contact Info    Colonel yBron MD Family Practice Schedule an appointment as soon as possible for a visit ER follow-up [address] [place] [address]  [phone]      Fernanda Theron  Schedule an appointment as soon as possible for a visit As needed [address] [place] [address] [address] [facility] [address] [address]  [phone]    Ann[address] [facility] Emergency Medicine  As needed, If symptoms worsen [facility]  [phone]        3. Return to ED for new or worsening symptoms       Julian Avila NP        Please note that this dictation was completed with Yovia, the computer voice recognition software. Quite often unanticipated grammatical, syntax, homophones, and other interpretive errors are inadvertently transcribed by the computer software. Please disregard these errors. Please excuse any errors that have escaped final proofreading.

## 2019-08-28 ENCOUNTER — DOCUMENTATION ONLY (OUTPATIENT)
Dept: FAMILY MEDICINE CLINIC | Age: 49
End: 2019-08-28

## 2019-08-28 NOTE — PROGRESS NOTES
Pt called requesting name of urologist referred to by Dr. Dallas Martinez, noting she had work physical yesterday and again had blood in urine. Pt advised of referral to Dr. Val Michel, given phone number to schedule and agrees to plan.  Letty

## 2019-09-10 ENCOUNTER — OFFICE VISIT (OUTPATIENT)
Dept: NEUROLOGY | Age: 49
End: 2019-09-10

## 2019-09-10 VITALS
OXYGEN SATURATION: 97 % | HEART RATE: 74 BPM | HEIGHT: 65 IN | SYSTOLIC BLOOD PRESSURE: 132 MMHG | DIASTOLIC BLOOD PRESSURE: 80 MMHG | WEIGHT: 180.6 LBS | BODY MASS INDEX: 30.09 KG/M2

## 2019-09-10 DIAGNOSIS — M54.2 CERVICALGIA: ICD-10-CM

## 2019-09-10 DIAGNOSIS — G43.719 CHRONIC MIGRAINE WITHOUT AURA, INTRACTABLE, WITHOUT STATUS MIGRAINOSUS: Primary | ICD-10-CM

## 2019-09-10 DIAGNOSIS — G25.0 ESSENTIAL TREMOR: ICD-10-CM

## 2019-09-10 DIAGNOSIS — G44.229 CHRONIC TENSION-TYPE HEADACHE, NOT INTRACTABLE: ICD-10-CM

## 2019-09-10 DIAGNOSIS — M54.81 BILATERAL OCCIPITAL NEURALGIA: ICD-10-CM

## 2019-09-10 RX ORDER — PROPRANOLOL HYDROCHLORIDE 10 MG/1
10 TABLET ORAL 2 TIMES DAILY
Qty: 60 TAB | Refills: 5 | Status: SHIPPED | OUTPATIENT
Start: 2019-09-10 | End: 2019-12-17

## 2019-09-10 RX ORDER — DICLOFENAC SODIUM 75 MG/1
TABLET, DELAYED RELEASE ORAL
Refills: 0 | COMMUNITY
Start: 2019-08-12 | End: 2019-11-13

## 2019-09-10 RX ORDER — SUCRALFATE 1 G/10ML
SUSPENSION ORAL
Refills: 0 | COMMUNITY
Start: 2019-09-08 | End: 2021-01-13

## 2019-09-10 NOTE — PATIENT INSTRUCTIONS
Neck: Exercises  Introduction  Here are some examples of exercises for you to try. The exercises may be suggested for a condition or for rehabilitation. Start each exercise slowly. Ease off the exercises if you start to have pain. You will be told when to start these exercises and which ones will work best for you. How to do the exercises  Neck stretch    1. This stretch works best if you keep your shoulder down as you lean away from it. To help you remember to do this, start by relaxing your shoulders and lightly holding on to your thighs or your chair. 2. Tilt your head toward your shoulder and hold for 15 to 30 seconds. Let the weight of your head stretch your muscles. 3. If you would like a little added stretch, use your hand to gently and steadily pull your head toward your shoulder. For example, keeping your right shoulder down, lean your head to the left. 4. Repeat 2 to 4 times toward each shoulder. Diagonal neck stretch    1. Turn your head slightly toward the direction you will be stretching, and tilt your head diagonally toward your chest and hold for 15 to 30 seconds. 2. If you would like a little added stretch, use your hand to gently and steadily pull your head forward on the diagonal.  3. Repeat 2 to 4 times toward each side. Dorsal glide stretch    1. Sit or stand tall and look straight ahead. 2. Slowly tuck your chin as you glide your head backward over your body  3. Hold for a count of 6, and then relax for up to 10 seconds. 4. Repeat 8 to 12 times. Chest and shoulder stretch    1. Sit or stand tall and glide your head backward as in the dorsal glide stretch. 2. Raise both arms so that your hands are next to your ears. 3. Take a deep breath, and as you breathe out, lower your elbows down and behind your back. You will feel your shoulder blades slide down and together, and at the same time you will feel a stretch across your chest and the front of your shoulders.   4. Hold for about 6 seconds, and then relax for up to 10 seconds. 5. Repeat 8 to 12 times. Strengthening: Hands on head    1. Move your head backward, forward, and side to side against gentle pressure from your hands, holding each position for about 6 seconds. 2. Repeat 8 to 12 times. Follow-up care is a key part of your treatment and safety. Be sure to make and go to all appointments, and call your doctor if you are having problems. It's also a good idea to know your test results and keep a list of the medicines you take. Where can you learn more? Go to http://mario-barry.info/. Enter P975 in the search box to learn more about \"Neck: Exercises. \"  Current as of: September 20, 2018  Content Version: 12.1  © 2133-6805 [employer]. Care instructions adapted under license by [employer] (which disclaims liability or warranty for this information). If you have questions about a medical condition or this instruction, always ask your healthcare professional. [address] any warranty or liability for your use of this information. Migraine Headache: Care Instructions  Your Care Instructions  Migraines are painful, throbbing headaches that often start on one side of the head. They may cause nausea and vomiting and make you sensitive to light, sound, or smell. Without treatment, migraines can last from 4 hours to a few days. Medicines can help prevent migraines or stop them after they have started. Your doctor can help you find which ones work best for you. Follow-up care is a key part of your treatment and safety. Be sure to make and go to all appointments, and call your doctor if you are having problems. It's also a good idea to know your test results and keep a list of the medicines you take. How can you care for yourself at home? · Do not drive if you have taken a prescription pain medicine.   · Rest in a quiet, dark room until your headache is gone. Close your eyes, and try to relax or go to sleep. Don't watch TV or read. · Put a cold, moist cloth or cold pack on the painful area for 10 to 20 minutes at a time. Put a thin cloth between the cold pack and your skin. · Use a warm, moist towel or a heating pad set on low to relax tight shoulder and neck muscles. · Have someone gently massage your neck and shoulders. · Take your medicines exactly as prescribed. Call your doctor if you think you are having a problem with your medicine. You will get more details on the specific medicines your doctor prescribes. · Be careful not to take pain medicine more often than the instructions allow. You could get worse or more frequent headaches when the medicine wears off. To prevent migraines  · Keep a headache diary so you can figure out what triggers your headaches. Avoiding triggers may help you prevent headaches. Record when each headache began, how long it lasted, and what the pain was like. (Was it throbbing, aching, stabbing, or dull?) Write down any other symptoms you had with the headache, such as nausea, flashing lights or dark spots, or sensitivity to bright light or loud noise. Note if the headache occurred near your period. List anything that might have triggered the headache. Triggers may include certain foods (chocolate, cheese, wine) or odors, smoke, bright light, stress, or lack of sleep. · If your doctor has prescribed medicine for your migraines, take it as directed. You may have medicine that you take only when you get a migraine and medicine that you take all the time to help prevent migraines. ? If your doctor has prescribed medicine for when you get a headache, take it at the first sign of a migraine, unless your doctor has given you other instructions. ? If your doctor has prescribed medicine to prevent migraines, take it exactly as prescribed. Call your doctor if you think you are having a problem with your medicine.   · Find healthy ways to deal with stress. Migraines are most common during or right after stressful times. Take time to relax before and after you do something that has caused a migraine in the past.  · Try to keep your muscles relaxed by keeping good posture. Check your jaw, face, neck, and shoulder muscles for tension. Try to relax them. When you sit at a desk, change positions often. And make sure to stretch for 30 seconds each hour. · Get plenty of sleep and exercise. · Eat meals on a regular schedule. Avoid foods and drinks that often trigger migraines. These include chocolate, alcohol (especially red wine and port), aspartame, monosodium glutamate (MSG), and some additives found in foods (such as hot dogs, bronson, cold cuts, aged cheeses, and pickled foods). · Limit caffeine. Don't drink too much coffee, tea, or soda. But don't quit caffeine suddenly. That can also give you migraines. · Do not smoke or allow others to smoke around you. If you need help quitting, talk to your doctor about stop-smoking programs and medicines. These can increase your chances of quitting for good. · If you are taking birth control pills or hormone therapy, talk to your doctor about whether they are triggering your migraines. When should you call for help? Call 911 anytime you think you may need emergency care. For example, call if:    · You have signs of a stroke. These may include:  ? Sudden numbness, paralysis, or weakness in your face, arm, or leg, especially on only one side of your body. ? Sudden vision changes. ? Sudden trouble speaking. ? Sudden confusion or trouble understanding simple statements. ? Sudden problems with walking or balance.   ? A sudden, severe headache that is different from past headaches.    Call your doctor now or seek immediate medical care if:    · You have new or worse nausea and vomiting.     · You have a new or higher fever.     · Your headache gets much worse.    Watch closely for changes in your health, and be sure to contact your doctor if:    · You are not getting better after 2 days (48 hours). Where can you learn more? Go to http://mario-barry.info/. Enter G875 in the search box to learn more about \"Migraine Headache: Care Instructions. \"  Current as of: March 28, 2019  Content Version: 12.1  © 0248-4152 [employer]. Care instructions adapted under license by [employer] (which disclaims liability or warranty for this information). If you have questions about a medical condition or this instruction, always ask your healthcare professional. [place] [address] any warranty or liability for your use of this information. Shoulder Blade: Exercises  Your Care Instructions  Here are some examples of typical exercises for your condition. Start each exercise slowly. Ease off the exercise if you start to have pain. Your doctor or physical therapist will tell you when you can start these exercises and which ones will work best for you. How to do the exercises  Shoulder roll    1. Stand tall with your chin slightly tucked. Imagine that a string at the top of your head is pulling you straight up. 2. Keep your arms relaxed. All motion will be in your shoulders. 3. Shrug your shoulders up toward your ears, then up and back. Ken[place]ze your shoulders down and back, like you're sliding your hands down into your back pants pockets. 4. Repeat the circles at least 2 to 4 times. 5. This exercise is also helpful anytime you want to relax. Lower neck and upper back stretch    1. With your arms about shoulder height, clasp your hands in front of you. 2. Drop your chin toward your chest.  3. Reach straight forward so you are rounding your upper back. Think about pulling your shoulder blades apart. Reina White feel a stretch across your upper back and shoulders. Hold for at least 6 seconds. 4. Repeat 2 to 4 times. Triceps stretch    1.  Reach your arm straight up.  2. Keeping your elbow in place, bend your arm and reach your hand down behind your back. 3. With your other hand, apply gentle pressure to the bent elbow. Ananth Sage feel a stretch at the back of your upper arm and shoulder. Hold about 6 seconds. 4. Repeat 2 to 4 times with each arm. Shoulder stretch    1. Relax your shoulders. 2. Raise one arm to shoulder height, and reach it across your chest.  3. Pull the arm slightly toward you with your other arm. This will help you get a gentle stretch. Hold for about 6 seconds. 4. Repeat 2 to 4 times. Shoulder blade squeeze    1. Sit or stand up tall with your arms at your sides. 2. Keep your shoulders relaxed and down, not shrugged. 3. Squeeze your shoulder blades together. Hold for 6 seconds, then relax. 4. Repeat 8 to 12 times. Straight-arm shoulder blade squeeze    1. Sit or stand tall. Relax your shoulders. 2. With palms down, hold your elastic tubing or band straight out in front of you. 3. Start with slight tension in the tubing or band, with your hands about shoulder-width apart. 4. Slowly pull straight out to the sides, squeezing your shoulder blades together. Keep your arms straight and at shoulder height. Slowly release. 5. Repeat 8 to 12 times. Rowing    1. [place] your elastic tubing or band at about waist height. Take one end in each hand. 2. Sit or stand with your feet hip-width apart. 3. Hold your arms straight in front of you. Adjust your distance to create slight tension in the tubing or band. 4. Slightly tuck your chin. Relax your shoulders. 5. Without shrugging your shoulders, pull straight back. Your elbows will pass alongside your waist.    Pull-downs    1. [place] your elastic tubing or band in the top of a closed door. Take one end in each hand. 2. Either sit or stand, depending on what is more comfortable. If you feel unsteady, sit on a chair. 3. Start with your arms up and comfortably apart, elbows straight.  There should be a slight tension in the tubing or band. 4. Slightly tuck your chin, and look straight ahead. 5. Keeping your back straight, slowly pull down and back, bending your elbows. 6. Stop where your hands are level with your chin, in a \"goalpost\" position. 7. Repeat 8 to 12 times. Chest T stretch    1. Lie on your back. Raise your knees so they are bent. Plant your feet on the floor, hip-width apart. 2. Tuck your chin, and relax your shoulders. 3. Reach your arms straight out to the sides. If you don't feel a mild stretch in your shoulders and across your chest, use a foam roll or a tightly rolled blanket under your spine, from your tailbone to your head. 4. Relax in this position for at least 15 to 30 seconds while you breathe normally. Repeat 2 to 4 times. 5. As you get used to this stretch, keep adding a little more time until you are able relax in this position for 2 or 3 minutes. When you can relax for at least 2 minutes, you only need to do the exercise 1 time per session. Chest goalpost stretch    1. Lie on your back. Raise your knees so they are bent. Plant your feet on the floor, hip-width apart. 2. Tuck your chin, and relax your shoulders. 3. Reach your arms straight out to the sides. 4. Bend your arms at the elbows, with your hands pointed toward the top of your head. Your arms should make an L on either side of your head. Your palms should be facing up. 5. If you don't feel a mild stretch in your shoulders and across your chest, use a foam roll or tightly rolled blanket under your spine, from your tailbone to your head. 6. Relax in this position for at least 15 to 30 seconds while you breathe normally. Repeat 2 to 4 times. 7. Each day you do this exercise, add a little more time until you can relax in this position for 2 or 3 minutes. When you can relax for at least 2 minutes, you only need to do the exercise 1 time per session. Follow-up care is a key part of your treatment and safety. Be sure to make and go to all appointments, and call your doctor if you are having problems. It's also a good idea to know your test results and keep a list of the medicines you take. Where can you learn more? Go to http://mario-barry.info/. Enter ([phone] 2026 1301 in the search box to learn more about \"Shoulder Blade: Exercises. \"  Current as of: September 20, 2018  Content Version: 12.1  © 5657-6424 [employer]. Care instructions adapted under license by [employer] (which disclaims liability or warranty for this information). If you have questions about a medical condition or this instruction, always ask your healthcare professional. [address] any warranty or liability for your use of this information. Benign Essential Tremor: Care Instructions  Your Care Instructions    Benign essential tremor is a medical term for shaking that you can't control. Your hand or fingers may shake when you lift a cup or point at something. Or your voice may shake when you speak. This type of tremor is not harmful. It is not caused by a stroke or Parkinson's disease. Some things can affect how much you shake. For example, drinking or eating something with caffeine may make tremors worse for a while. Some medicines also can increase tremors. These include antidepressants and too much thyroid replacement. Talk to your doctor if you think one of your medicines makes your tremors worse. If you are self-conscious about your tremors, there are some things you can do to reduce them or make them less noticeable. This includes taking medicine. Follow-up care is a key part of your treatment and safety. Be sure to make and go to all appointments, and call your doctor if you are having problems. It's also a good idea to know your test results and keep a list of the medicines you take. How can you care for yourself at home? · Take your medicines exactly as prescribed.  Call your doctor if you think you are having a problem with your medicine. Some medicines that help control tremors have to be taken every day, even if you are not having tremors. You will get more details on the specific medicines your doctor prescribes. · Get plenty of rest.  · Eat a balanced, healthy diet. · Try to reduce stress. Regular exercise and massages may help. · Limit alcohol. Heavy drinking can make your tremors worse. · Avoid drinks or foods with caffeine if they make your tremors worse. These include tea, cola, coffee, and chocolate. · Wear a heavy bracelet or watch. This adds a little weight to your hand. The extra weight may reduce tremors. · Drink from cups or glasses that are only half full. You may also want to try drinking with a straw. When should you call for help? Watch closely for changes in your health, and be sure to contact your doctor if:    · You notice your tremors are getting worse.     · You can't do your everyday activities because of your tremors.     · You are sad and embarrassed about your shaking. Where can you learn more? Go to http://mario-barry.info/. Enter B746 in the search box to learn more about \"Benign Essential Tremor: Care Instructions. \"  Current as of: March 28, 2019  Content Version: 12.1  © 1221-9256 [employer]. Care instructions adapted under license by [employer] (which disclaims liability or warranty for this information). If you have questions about a medical condition or this instruction, always ask your healthcare professional. [place] [address] any warranty or liability for your use of this information.

## 2019-09-10 NOTE — LETTER
NOTIFICATION RETURN TO WORK / SCHOOL    9/10/2019 9:49 AM    Ms. Pantera Nicole  [address]  [address] [address] [phone]      To Whom It May Concern:    wSathi Granado is currently under the care of [address] [place] [address]. She will return to work/school on: 9/11/2019    If there are questions or concerns please have the patient contact our office.         Sincerely,      Giovanni Bateman MD

## 2019-09-10 NOTE — LETTER
9/10/19    Patient: Paul Nicole   YOB: 1970   Date of Visit: 9/10/2019     Delonte Dhillon MD  [address] [place] [address]  V[facility] In [facility] [address]       Dear Delonte Dhillon MD,      Thank you for referring Ms. Peyton Nicole to [address] [place] [address] for evaluation. My notes for this consultation are attached. If you have questions, please do not hesitate to call me. I look forward to following your patient along with you.       Sincerely,    Jae Gitelman., MD

## 2019-09-10 NOTE — PROGRESS NOTES
NEUROLOGY CLINIC NOTE    Patient ID:  Brett Clancy  [id number]  71 y.o.  1970    Date of Visit:  September 10, 2019    Reason for Visit: chronic headaches, neck pain, tremors    Chief Complaint   Patient presents with    Follow-up       History of Present Illness:     Patient Active Problem List    Diagnosis Date Noted    Severe obesity (BMI 35.0-39. 9) with comorbidity ([address] 04/16/2018    Chronic insomnia 03/28/2017    Non morbid obesity 03/28/2017    Vitamin D deficiency 10/20/2015    Allergic rhinitis 06/14/2013    Hypercholesteremia 01/23/2012    Allergy 07/14/2011    Mild intermittent asthma without complication 55/84/1874    Migraines 07/14/2011    GERD (gastroesophageal reflux disease) 07/14/2011    Headache(784.0) 07/14/2011    Cerebral vascular malformation 07/14/2011     Past Medical History:   Diagnosis Date    Allergic rhinitis 6/14/2013    Asthma     Chronic insomnia 3/28/2017    GERD (gastroesophageal reflux disease)     Headache     Headache(784.0)     Hypercholesteremia 1/23/2012    Non morbid obesity 3/28/2017    Snoring     Vitamin D deficiency 10/20/2015      Past Surgical History:   Procedure Laterality Date    ENDOSCOPY, COLON, DIAGNOSTIC  12/03 & 04/09    HX GYN      Laproscopy, BTL,TVH    HX GYN      hysterectomy    HX HYSTERECTOMY        Prior to Admission medications    Medication Sig Start Date End Date Taking? Authorizing Provider   topiramate ER (TROKENDI XR) 50 mg capsule Take 1 Cap by mouth daily. In addition to 100 mg daily for a total of 150 mg daily. 7/5/19  Yes Mancel Hopping., MD   butalbital-acetaminophen-caff (FIORICET) -40 mg per capsule Take 1 Cap by mouth every six (6) hours as needed for Headache. 5/31/19  Yes Mancel Hopping., MD   Diclofenac Potassium (CAMBIA) 50 mg pwpk Take 1 Package by mouth daily as needed (headache).  5/31/19  Yes Mancel Hopping., MD   fexofenadine (ALLEGRA) 180 mg tablet Take 1 Tab by mouth daily as needed for Allergies. 3/6/19  Yes Toñito Madrigal MD   fluticasone (FLONASE) 50 mcg/actuation nasal spray 2 Sprays by Both Nostrils route daily. 3/6/19  Yes Toñito Madrigal MD   topiramate ER (TROKENDI XR) 100 mg capsule Take 1 Cap by mouth daily. 2/7/19  Yes Florinda Bear MD   valACYclovir (VALTREX) 500 mg tablet TK 1 T PO  QD 12/23/18  Yes Provider, Hal   propranolol (INDERAL) 10 mg tablet Take 1 Tab by mouth two (2) times a day. 1/2/19  Yes Florinda Bear MD   traZODone (DESYREL) 100 mg tablet TAKE 1 TABLET BY MOUTH NIGHTLY 9/5/18  Yes Toñito Madrigal MD   omeprazole (PRILOSEC) 40 mg capsule Take 1 Cap by mouth daily. 7/21/18  Yes Manjinder Angel MD   albuterol (PROVENTIL HFA, VENTOLIN HFA, PROAIR HFA) 90 mcg/actuation inhaler Take 2 Puffs by inhalation every four (4) hours as needed for Wheezing or Shortness of Breath. 12/13/17  Yes Toñito Madrigal MD   albuterol-ipratropium (DUO-NEB) 2.5 mg-0.5 mg/3 ml nebu 3 mL by Nebulization route every four (4) hours as needed. 12/13/17  Yes Toñito Madrigal MD   ibuprofen (MOTRIN) 600 mg tablet Take 1 Tab by mouth every six (6) hours as needed for Pain. Do not take if you have taken Morna Mends in the last 12 hours 8/4/19   Talita Singh NP     Allergies   Allergen Reactions    Latex Rash    Aspirin Other (comments)     \"It doesn't digest in my system\"      Social History     Tobacco Use    Smoking status: Never Smoker    Smokeless tobacco: Never Used   Substance Use Topics    Alcohol use: Yes     Comment: Occasionally      Family History   Problem Relation Age of Onset    Hypertension Father     Kidney Disease Maternal Grandmother     Hypertension Maternal Grandmother     Heart Attack Paternal Grandfather     Hypertension Mother     Cancer Maternal Grandfather         lung        Subjective:      Carly Abernathy is a 53 y.o.  RHAA female with history of chronic migraines, chronic tension headache and hyperlipidemia who is here to establish her care. Patient was a previous patient at [facility] [place] [address] and [facility] neurology. Patient was being seen for headaches. Onset 15 years ago. Severity: moderate to severe. It occurs daily, has more than one hour duration and is unchanged. Location was bitemporal and occipital. There was radiation to the neck and shoulders. The patient describes it as pressure and ache. Timing of headache: upon awakening. Denies aggravating factors. Denies relieving factors. Associated symptoms include phonophobia. Pertinent negatives include blurred vision, family history of migraine, nausea, photophobia, vertigo and vomiting. Per patient condition started since 2002. Previously seen by neurosurgery who did a brain MRI last 3/27/2002 which showed a small venous angioma right occipital lobe. Acute onset of two types of headache. One that is pressure like. 9/10 at the back of her head (R>L) and the other bitemporal achy like that is 5/10 in intensity. It lasts for hours but she does have headache free periods during the day. Associated with slowed thought process and noise sensitivity. Brain MRI done with and without contrast 10/20/2011 which was unremarkable except for the persistent right occipital venous anomaly. Patient was diagnosed with occipital neuralgia, chronic tension-type headache and congenital anomaly of the cerebrovascular system. When patient was seen at [facility] on 5/9/2012 she reported compliance with her medication regimen and she was having significant benefit. She was not having any major headaches and rarely uses the Fioricet. She was taking Propranolol 10 mg BID and Amitriptyline 30 mg at bedtime. Amitriptyline was helping her sleep. She also was evaluated last 10/29/14 and 12/31/2014 for dizziness and vertigo. She was seen by Dr. Heidi Day (ENT) and was cleared. Patient was known to be none compliant with follow up visit when she is well.  She was last seen at [facility] 5/28/2015, then she reported improvement of her headaches in the sense that they are less intense but it still occurs everyday. Patient has previous tried Ibuprofen and Naproxen without any relief. The Cambia helps abort it immediately. BUPAP helps relieve it after 15 to 30 minutes and she takes it everyday. Denies any side effects. The headaches however recurs. She is also sleeping better with the Temazepam 30 mg at bedtime. Denies any sedating or cognitive side effects. She tries to correct her posture. She has been doing the neck and shoulder exercises. Plan was to continue her regimen. Since December 2017, patient reports worsening of her headache again. She apparently has been in and out of the hospital [facility]) from end of December 2017 to first week of January for respiratory issues with multiple prednisone treatments. The last 1/12/2018 patient was at the [facility] ER for severe headaches and underwent lumbar puncture. CSF studies were unremarkable. Elevated WBC (14.91). Since then, patient has been having abrupt onset when sitting or standing of bitemporal and bilateral occiput pain. Pressure like with neck stiffness. 8/10. Hazeline Parlor takes the edge off it. Associated with hot flashes and slight blurring of vision. Resolves with laying down. Patient did go back to the ER at [facility]. She did get a blood patch. It helped improve the positional headache and neck stiffness. Patient underwent a brain MRI with and without contrast 1/29/2018 was normal. No abnormal enhancements. Since her last visit on 5/31/2019, patient reports that she is doing well from a headache standpoint. Headaches are less frequent and intense. Not daily anymore. She is on Trokendi XR a total of 150 mg daily and Propranolol 10 mg BID. Denies any side effects. Fioricet and Cambia continue to offer relief as abortive therapy for her headaches.   She also reports improvement of the episodes of shooting pains from her neck that radiates down to her shoulders with head movements. Relief was obtained when she did physical therapy with dry needling for 8 sessions. Her issues with intermittent hand tremors continue to be better and not any worse. She does report recently brief episodes of sharp pain either left or right parieto-occiput area. No known trigger. Last for a split second. Unpredicatble occurrence. Outside reports reviewed: none    Review of Systems:    A comprehensive review of systems was negative except for:   Neck pain and headaches    Objective:     Visit Vitals  /80   Pulse 74   Ht 5' 5\" (1.651 m)   Wt 180 lb 9.6 oz (81.9 kg)   SpO2 97%   BMI 30.05 kg/m²     5/10 headache    PHYSICAL EXAM:    NEUROLOGICAL EXAM:    Appearance: The patient is overweight, provides a coherent history and is in no acute distress. Mental Status: Oriented to time, place and person. Fluent, no aphasia or dysarthria. Mood and affect appropriate. Cranial Nerves:   II - XII were intact. Motor:  5/5 strength. Normal bulk and tone. No fasciculations. Reflexes:   Deep tendon reflexes were symmetrical. Downgoing toes. Sensory:   Normal to cold, pinprick and vibration. Gait:  Normal gait. No Romberg. Tremor:   No obvious hand tremors noted today. No cogwheel rigidity. Cerebellar:  Intact FTN/CASPER/HTS. Anterior head posture (2-3 FB) with shoulders rotated in  (+) Tenderness on palpation bilateral occiput       Assessment:   Chronic migraine intractable without aura   Chronic tension type headache   Essential tremors    Cervicalgia   Occipital neuralgia - worsening    Plan:   1. Neurology exam is unchanged. It is still non-focal. Previous brain MRI with and without contrast was normal. Continue Trokendi XR total of 150 mg daily. Continue Cambia for abortive therapy. Advised to refrain from things that could trigger a headache (dairy, chocolate, wine and etc.). Advised to keep a headache log. 2. Continue Propranolol 10 mg BID.  Continue Fioricet for abortive therapy. 3. No obvious UE tremors today. No intentional or resting hand tremors. No tone changes or cogwheel rigidity. No bradykinesia. No evidence at this time to support a diagnosis of Parkinson's disease. Findings consistent with essential tremors. Normal brain MRI. Current regimen of Propranolol and Topiramate are actually medications used to treat tremors. 4. Neck pain due to poor anterior head posture. Encouraged to continue to correct her anterior head posture. Do the neck and shoulder exercises taught by therapy. Potential trial of muscle relaxants if condition worsens. 5.  Exam reveals elements of emerging bilateral occipital neuralgia due to poor anterior head posture. Advised to correct her anterior head posture. Use headrest at work, at home and while driving. Do not carry anything on the shoulder. Use wireless headsets. Use only one pillow at most when sleeping. Advised to obtain helmet type head massager. Potential bilateral occipital nerve blocks if condition worsens. All questions and concerns were answered. This note was created using voice recognition software. Despite editing, there may be syntax errors.

## 2019-09-24 DIAGNOSIS — F51.04 CHRONIC INSOMNIA: ICD-10-CM

## 2019-09-24 RX ORDER — TRAZODONE HYDROCHLORIDE 100 MG/1
TABLET ORAL
Qty: 30 TAB | Refills: 0 | OUTPATIENT
Start: 2019-09-24

## 2019-09-25 NOTE — TELEPHONE ENCOUNTER
Called and spoke with pt, and she has been advised and states understanding that an appointment is needed prior to refills on her Trazodone RX. Pt states she will call office back to schedule.

## 2019-11-13 ENCOUNTER — OFFICE VISIT (OUTPATIENT)
Dept: FAMILY MEDICINE CLINIC | Age: 49
End: 2019-11-13

## 2019-11-13 VITALS
WEIGHT: 180 LBS | HEART RATE: 73 BPM | RESPIRATION RATE: 18 BRPM | BODY MASS INDEX: 29.99 KG/M2 | DIASTOLIC BLOOD PRESSURE: 79 MMHG | HEIGHT: 65 IN | SYSTOLIC BLOOD PRESSURE: 125 MMHG | TEMPERATURE: 96.6 F

## 2019-11-13 DIAGNOSIS — Z00.00 HEALTHCARE MAINTENANCE: ICD-10-CM

## 2019-11-13 DIAGNOSIS — G43.909 MIGRAINE WITHOUT STATUS MIGRAINOSUS, NOT INTRACTABLE, UNSPECIFIED MIGRAINE TYPE: Primary | ICD-10-CM

## 2019-11-13 DIAGNOSIS — F51.04 CHRONIC INSOMNIA: ICD-10-CM

## 2019-11-13 DIAGNOSIS — T78.40XA ALLERGIC STATE, INITIAL ENCOUNTER: ICD-10-CM

## 2019-11-13 DIAGNOSIS — E55.9 VITAMIN D DEFICIENCY: ICD-10-CM

## 2019-11-13 RX ORDER — TRAZODONE HYDROCHLORIDE 100 MG/1
TABLET ORAL
Qty: 90 TAB | Refills: 4 | Status: SHIPPED | OUTPATIENT
Start: 2019-11-13 | End: 2020-12-17 | Stop reason: SDUPTHER

## 2019-11-13 RX ORDER — FLUTICASONE PROPIONATE 50 MCG
2 SPRAY, SUSPENSION (ML) NASAL DAILY
Qty: 3 BOTTLE | Refills: 4 | Status: SHIPPED | OUTPATIENT
Start: 2019-11-13 | End: 2021-01-13

## 2019-11-13 RX ORDER — ALBUTEROL SULFATE 90 UG/1
2 AEROSOL, METERED RESPIRATORY (INHALATION)
Qty: 1 INHALER | Refills: 0 | Status: SHIPPED | OUTPATIENT
Start: 2019-11-13 | End: 2021-01-13

## 2019-11-13 NOTE — PROGRESS NOTES
HISTORY OF PRESENT ILLNESS  Hema Ulloa is a 51 y.o. female. Patient presents with:  Headache: insomnia f/u; discuss vit d labs  Medication Refill: trazadone    She is seeing neurology for her headaches and she says they are doing fine. Also, she needs her trazodone refilled. It works for several hours. She has a history of vitamin D deficiency and needs this checked. Review of Systems   Constitutional: Negative for malaise/fatigue and weight loss. No weight gain   Respiratory: Negative for shortness of breath. Cardiovascular: Negative for chest pain and palpitations. Neurological: Positive for headaches. Psychiatric/Behavioral: Negative for depression and substance abuse. The patient has insomnia. The patient is not nervous/anxious. Visit Vitals  /79   Pulse 73   Temp 96.6 °F (35.9 °C) (Oral)   Resp 18   Ht 5' 5\" (1.651 m)   Wt 180 lb (81.6 kg)   BMI 29.95 kg/m²     Physical Exam   Constitutional: She is oriented to person, place, and time. She appears well-developed and well-nourished. No distress. Neurological: She is alert and oriented to person, place, and time. She displays no tremor. Skin: She is not diaphoretic. Psychiatric: She has a normal mood and affect. Her behavior is normal. Thought content normal.       ASSESSMENT and PLAN    ICD-10-CM ICD-9-CM    1. Migraine without status migrainosus, not intractable, unspecified migraine type G43.909 346.90    2. Chronic insomnia F51.04 780.52 traZODone (DESYREL) 100 mg tablet   3. Vitamin D deficiency E55.9 268.9 VITAMIN D, 25 HYDROXY   4. Healthcare maintenance D63.37 A86.7 METABOLIC PANEL, COMPREHENSIVE      LIPID PANEL      CBC WITH AUTOMATED DIFF   5. Allergic state, initial encounter T78.40XA 995.3 fluticasone propionate (FLONASE) 50 mcg/actuation nasal spray        Migraines doing well  Insomnia, could do better  Needs vitamin D checked  Labs per orders.   Refills per orders  Continue to follow up with neurology    Follow-up and Dispositions    · Return in about 1 year (around 11/13/2020) for insomnia. Reviewed plan of care. Patient has provided input and agrees with goals.

## 2019-11-13 NOTE — PROGRESS NOTES
Chief Complaint   Patient presents with    Headache     insomnia f/u; discuss vit d labs    Medication Refill     trazadone     1. Have you been to the ER, urgent care clinic since your last visit? Hospitalized since your last visit? No     2. Have you seen or consulted any other health care providers outside of the [address] [place] [address] since your last visit? Include any pap smears or colon screening.  No

## 2019-11-14 LAB
25(OH)D3+25(OH)D2 SERPL-MCNC: 21.7 NG/ML (ref 30–100)
ALBUMIN SERPL-MCNC: 4.8 G/DL (ref 3.5–5.5)
ALBUMIN/GLOB SERPL: 1.8 {RATIO} (ref 1.2–2.2)
ALP SERPL-CCNC: 70 IU/L (ref 39–117)
ALT SERPL-CCNC: 16 IU/L (ref 0–32)
AST SERPL-CCNC: 18 IU/L (ref 0–40)
BASOPHILS # BLD AUTO: 0.1 X10E3/UL (ref 0–0.2)
BASOPHILS NFR BLD AUTO: 1 %
BILIRUB SERPL-MCNC: <0.2 MG/DL (ref 0–1.2)
BUN SERPL-MCNC: 14 MG/DL (ref 6–24)
BUN/CREAT SERPL: 16 (ref 9–23)
CALCIUM SERPL-MCNC: 9.1 MG/DL (ref 8.7–10.2)
CHLORIDE SERPL-SCNC: 104 MMOL/L (ref 96–106)
CHOLEST SERPL-MCNC: 300 MG/DL (ref 100–199)
CO2 SERPL-SCNC: 21 MMOL/L (ref 20–29)
COMMENT, 011824: ABNORMAL
CREAT SERPL-MCNC: 0.9 MG/DL (ref 0.57–1)
EOSINOPHIL # BLD AUTO: 0.3 X10E3/UL (ref 0–0.4)
EOSINOPHIL NFR BLD AUTO: 3 %
ERYTHROCYTE [DISTWIDTH] IN BLOOD BY AUTOMATED COUNT: 13.5 % (ref 12.3–15.4)
GLOBULIN SER CALC-MCNC: 2.7 G/DL (ref 1.5–4.5)
GLUCOSE SERPL-MCNC: 74 MG/DL (ref 65–99)
HCT VFR BLD AUTO: 42.7 % (ref 34–46.6)
HDLC SERPL-MCNC: 79 MG/DL
HGB BLD-MCNC: 14.3 G/DL (ref 11.1–15.9)
IMM GRANULOCYTES # BLD AUTO: 0.1 X10E3/UL (ref 0–0.1)
IMM GRANULOCYTES NFR BLD AUTO: 1 %
INTERPRETATION, 910389: NORMAL
LDLC SERPL CALC-MCNC: 204 MG/DL (ref 0–99)
LYMPHOCYTES # BLD AUTO: 3.4 X10E3/UL (ref 0.7–3.1)
LYMPHOCYTES NFR BLD AUTO: 35 %
MCH RBC QN AUTO: 29.6 PG (ref 26.6–33)
MCHC RBC AUTO-ENTMCNC: 33.5 G/DL (ref 31.5–35.7)
MCV RBC AUTO: 88 FL (ref 79–97)
MONOCYTES # BLD AUTO: 0.7 X10E3/UL (ref 0.1–0.9)
MONOCYTES NFR BLD AUTO: 7 %
NEUTROPHILS # BLD AUTO: 5.2 X10E3/UL (ref 1.4–7)
NEUTROPHILS NFR BLD AUTO: 53 %
PLATELET # BLD AUTO: 353 X10E3/UL (ref 150–450)
POTASSIUM SERPL-SCNC: 4 MMOL/L (ref 3.5–5.2)
PROT SERPL-MCNC: 7.5 G/DL (ref 6–8.5)
RBC # BLD AUTO: 4.83 X10E6/UL (ref 3.77–5.28)
SODIUM SERPL-SCNC: 142 MMOL/L (ref 134–144)
TRIGL SERPL-MCNC: 86 MG/DL (ref 0–149)
VLDLC SERPL CALC-MCNC: 17 MG/DL (ref 5–40)
WBC # BLD AUTO: 9.8 X10E3/UL (ref 3.4–10.8)

## 2019-12-17 ENCOUNTER — OFFICE VISIT (OUTPATIENT)
Dept: FAMILY MEDICINE CLINIC | Age: 49
End: 2019-12-17

## 2019-12-17 VITALS
HEART RATE: 90 BPM | RESPIRATION RATE: 18 BRPM | HEIGHT: 65 IN | TEMPERATURE: 97.1 F | SYSTOLIC BLOOD PRESSURE: 99 MMHG | DIASTOLIC BLOOD PRESSURE: 56 MMHG | BODY MASS INDEX: 31.99 KG/M2 | WEIGHT: 192 LBS | OXYGEN SATURATION: 98 %

## 2019-12-17 DIAGNOSIS — R05.9 COUGH: ICD-10-CM

## 2019-12-17 DIAGNOSIS — J01.00 ACUTE NON-RECURRENT MAXILLARY SINUSITIS: Primary | ICD-10-CM

## 2019-12-17 RX ORDER — AZITHROMYCIN 500 MG/1
500 TABLET, FILM COATED ORAL DAILY
Qty: 3 TAB | Refills: 0 | Status: SHIPPED | OUTPATIENT
Start: 2019-12-17 | End: 2019-12-20

## 2019-12-17 RX ORDER — BENZONATATE 200 MG/1
200 CAPSULE ORAL
Qty: 30 CAP | Refills: 0 | Status: SHIPPED | OUTPATIENT
Start: 2019-12-17 | End: 2020-02-18 | Stop reason: ALTCHOICE

## 2019-12-17 NOTE — PROGRESS NOTES
Chief Complaint   Patient presents with    Sore Throat     headaches, cough    Ear Pain     left     1. Have you been to the ER, urgent care clinic since your last visit? Hospitalized since your last visit? No    2. Have you seen or consulted any other health care providers outside of the [address] [place] [address] since your last visit? Include any pap smears or colon screening.  No

## 2019-12-17 NOTE — PROGRESS NOTES
HISTORY OF PRESENT ILLNESS  Bridger Marion is a 51 y.o. female. Patient presents with:  Sore Throat: headaches, cough  Ear Pain: left    Her symptoms have been present for about 2 weeks and are getting worse. Nothing in particular makes her worse. She has tried Sudafed PE and Allegra with temporary relief. Tessalon helps. She is recovering from knee surgery. Review of Systems   Constitutional: Negative for chills, fever and malaise/fatigue. HENT: Positive for congestion, ear pain and sore throat. Mucous is unknown in color. There is no sinus pain. Postnasal drainage. Eyes: Negative for discharge and redness. Respiratory: Positive for cough and sputum production. Negative for hemoptysis, shortness of breath and wheezing. Her sputum is clear to yellow   Cardiovascular: Negative for chest pain. Neurological: Positive for headaches. Visit Vitals  BP 99/56   Pulse 90   Temp 97.1 °F (36.2 °C) (Oral)   Resp 18   Ht 5' 5\" (1.651 m)   Wt 192 lb (87.1 kg)   SpO2 98%    L/min   BMI 31.95 kg/m²     Physical Exam  Vitals signs and nursing note reviewed. Constitutional:       General: She is not in acute distress. Appearance: She is well-developed. She is not diaphoretic. HENT:      Head: Normocephalic. Right Ear: Tympanic membrane, ear canal and external ear normal.      Left Ear: Tympanic membrane, ear canal and external ear normal.      Nose:      Right Sinus: Maxillary sinus tenderness present. No frontal sinus tenderness. Left Sinus: Maxillary sinus tenderness present. No frontal sinus tenderness. Mouth/Throat:      Pharynx: Uvula midline. Eyes:      General:         Right eye: No discharge. Left eye: No discharge. Conjunctiva/sclera:      Right eye: Right conjunctiva is not injected. Left eye: Left conjunctiva is not injected. Cardiovascular:      Rate and Rhythm: Normal rate and regular rhythm.       Heart sounds: Normal heart sounds. No murmur. No friction rub. No gallop. Pulmonary:      Effort: Pulmonary effort is normal. No respiratory distress. Breath sounds: Normal breath sounds. No wheezing or rales. Lymphadenopathy:      Cervical: No cervical adenopathy. Skin:     General: Skin is warm and dry. Neurological:      Mental Status: She is alert and oriented to person, place, and time. ASSESSMENT and PLAN    ICD-10-CM ICD-9-CM    1. Acute non-recurrent maxillary sinusitis J01.00 461.0 azithromycin (ZITHROMAX) 500 mg tab   2. Cough R05 786.2 benzonatate (TESSALON) 200 mg capsule        Rest, push fluids  Continue current plans. Zithromax  Tessalon prn    Follow-up and Dispositions    · Return if not better in 5 days. Reviewed plan of care. Patient has provided input and agrees with goals.

## 2020-01-27 DIAGNOSIS — G44.229 CHRONIC TENSION-TYPE HEADACHE, NOT INTRACTABLE: ICD-10-CM

## 2020-01-27 RX ORDER — BUTALBITAL, ACETAMINOPHEN AND CAFFEINE 300; 40; 50 MG/1; MG/1; MG/1
1 CAPSULE ORAL
Qty: 60 CAP | Refills: 0 | Status: SHIPPED | OUTPATIENT
Start: 2020-01-27 | End: 2020-07-03

## 2020-01-27 NOTE — TELEPHONE ENCOUNTER
Received fax from [employer]. Prescription refill request for but/acetaminophen caff -40 cp take 1 cap every 6 hrs prn for headache #60. She was last seen 9/10/2019, no f/u scheduled.

## 2020-02-18 ENCOUNTER — OFFICE VISIT (OUTPATIENT)
Dept: URGENT CARE | Age: 50
End: 2020-02-18

## 2020-02-18 VITALS
DIASTOLIC BLOOD PRESSURE: 62 MMHG | WEIGHT: 192 LBS | BODY MASS INDEX: 31.99 KG/M2 | RESPIRATION RATE: 16 BRPM | OXYGEN SATURATION: 99 % | HEART RATE: 66 BPM | HEIGHT: 65 IN | TEMPERATURE: 98.2 F | SYSTOLIC BLOOD PRESSURE: 114 MMHG

## 2020-02-18 DIAGNOSIS — N39.0 ACUTE UTI: Primary | ICD-10-CM

## 2020-02-18 LAB
BILIRUB UR QL STRIP: NEGATIVE
GLUCOSE UR-MCNC: NEGATIVE MG/DL
KETONES P FAST UR STRIP-MCNC: NEGATIVE MG/DL
PH UR STRIP: 6 [PH] (ref 4.6–8)
PROT UR QL STRIP: NORMAL
SP GR UR STRIP: 1.03 (ref 1–1.03)
UA UROBILINOGEN AMB POC: NORMAL (ref 0.2–1)
URINALYSIS CLARITY POC: NORMAL
URINALYSIS COLOR POC: NORMAL
URINE BLOOD POC: NORMAL
URINE LEUKOCYTES POC: NORMAL
URINE NITRITES POC: NEGATIVE

## 2020-02-18 RX ORDER — NITROFURANTOIN 25; 75 MG/1; MG/1
100 CAPSULE ORAL 2 TIMES DAILY
Qty: 10 CAP | Refills: 0 | Status: SHIPPED | OUTPATIENT
Start: 2020-02-18 | End: 2020-02-23

## 2020-02-18 NOTE — PROGRESS NOTES
Here for urinary urgency and frequency for past 3-4 days  Occurs daily. Becoming more frequent.   Denies any fever, chills, back pain, vaginal discharge or abdominal pain  No aggravating or alleviating factors  Feels well otherwise             Past Medical History:   Diagnosis Date    Allergic rhinitis 6/14/2013    Asthma     Chronic insomnia 3/28/2017    GERD (gastroesophageal reflux disease)     Headache     Headache(784.0)     Hypercholesteremia 1/23/2012    Non morbid obesity 3/28/2017    Snoring     Vitamin D deficiency 10/20/2015        Past Surgical History:   Procedure Laterality Date    ENDOSCOPY, COLON, DIAGNOSTIC  12/03 & 04/09    HX GYN      Laproscopy, BTL,TVH    HX GYN      hysterectomy    HX HYSTERECTOMY           Family History   Problem Relation Age of Onset    Hypertension Father     Kidney Disease Maternal Grandmother     Hypertension Maternal Grandmother     Heart Attack Paternal Grandfather     Hypertension Mother     Cancer Maternal Grandfather         lung        Social History     Socioeconomic History    Marital status:      Spouse name: Not on file    Number of children: Not on file    Years of education: Not on file    Highest education level: Not on file   Occupational History    Not on file   Social Needs    Financial resource strain: Not on file    Food insecurity:     Worry: Not on file     Inability: Not on file    Transportation needs:     Medical: Not on file     Non-medical: Not on file   Tobacco Use    Smoking status: Never Smoker    Smokeless tobacco: Never Used   Substance and Sexual Activity    Alcohol use: Yes     Comment: Occasionally    Drug use: No    Sexual activity: Yes     Partners: Male     Birth control/protection: None   Lifestyle    Physical activity:     Days per week: Not on file     Minutes per session: Not on file    Stress: Not on file   Relationships    Social connections:     Talks on phone: Not on file     Gets together: Not on file     Attends Taoist service: Not on file     Active member of club or organization: Not on file     Attends meetings of clubs or organizations: Not on file     Relationship status: Not on file    Intimate partner violence:     Fear of current or ex partner: Not on file     Emotionally abused: Not on file     Physically abused: Not on file     Forced sexual activity: Not on file   Other Topics Concern    Not on file   Social History Narrative    Not on file                ALLERGIES: Latex and Aspirin    Review of Systems   Neurological: Negative for dizziness. All other systems reviewed and are negative. Vitals:    02/18/20 1146   BP: 114/62   Pulse: 66   Resp: 16   Temp: 98.2 °F (36.8 °C)   SpO2: 99%   Weight: 192 lb (87.1 kg)   Height: 5' 5\" (1.651 m)       Physical Exam  Vitals signs reviewed. Constitutional:       General: She is not in acute distress. Appearance: She is not diaphoretic. HENT:      Head: Normocephalic and atraumatic. Mouth/Throat:      Mouth: Mucous membranes are moist.   Cardiovascular:      Rate and Rhythm: Normal rate and regular rhythm. Heart sounds: Normal heart sounds. No murmur. No friction rub. No gallop. Pulmonary:      Effort: Pulmonary effort is normal. No respiratory distress. Breath sounds: Normal breath sounds. No wheezing or rales. Abdominal:      General: Abdomen is flat. There is no distension. Palpations: Abdomen is soft. There is no mass. Tenderness: There is no abdominal tenderness. There is no right CVA tenderness, left CVA tenderness, guarding or rebound. Skin:     Coloration: Skin is not pale. Neurological:      Mental Status: She is alert and oriented to person, place, and time. Psychiatric:         Behavior: Behavior normal.         Thought Content:  Thought content normal.         MDM     Differential Diagnosis; Clinical Impression; Plan:       CLINICAL IMPRESSION:  (N39.0) Acute UTI  (primary encounter diagnosis)    Orders Placed This Encounter      nitrofurantoin, macrocrystal-monohydrate, (MACROBID) 100 mg capsule          Sig: Take 1 Cap by mouth two (2) times a day for 5 days. Dispense:  10 Cap          Refill:  0      Plan:  UA Results: 2+ RAYMOND present. See below for other components. Based on the results of your Urinalysis and your history of symptoms will treat for uncomplicated urinary tract infection. Please take antibiotic as prescribed until completion. Maintain adequate fluid intake. May take 24-36 hours for symptoms to improve. We have reviewed concerning signs/symptoms, normal vs abnormal progression of medical condition and when to seek immediate medical attention. Schedule with PCP or Urgent Care immediately for worsening or new symptoms. See your PCP if there is not at least some improvement in symptoms within the next 4 days. You should see your PCP for updates on your routine health maintenance.              Procedures           Results for orders placed or performed in visit on 02/18/20   AMB POC URINALYSIS DIP STICK AUTO W/O MICRO   Result Value Ref Range    Color (UA POC)      Clarity (UA POC)      Glucose (UA POC) Negative Negative    Bilirubin (UA POC) Negative Negative    Ketones (UA POC) Negative Negative    Specific gravity (UA POC) 1.030 1.001 - 1.035    Blood (UA POC) 3+ Negative    pH (UA POC) 6.0 4.6 - 8.0    Protein (UA POC) Trace Negative    Urobilinogen (UA POC) 0.2 mg/dL 0.2 - 1    Nitrites (UA POC) Negative Negative    Leukocyte esterase (UA POC) 2+ Negative

## 2020-02-18 NOTE — PATIENT INSTRUCTIONS
Follow up with PCP without improvement over next 4 days sooner/immediately for new or worsening       Urinary Tract Infection in Women: Care Instructions  Your Care Instructions    A urinary tract infection, or UTI, is a general term for an infection anywhere between the kidneys and the urethra (where urine comes out). Most UTIs are bladder infections. They often cause pain or burning when you urinate. UTIs are caused by bacteria and can be cured with antibiotics. Be sure to complete your treatment so that the infection goes away. Follow-up care is a key part of your treatment and safety. Be sure to make and go to all appointments, and call your doctor if you are having problems. It's also a good idea to know your test results and keep a list of the medicines you take. How can you care for yourself at home? · Take your antibiotics as directed. Do not stop taking them just because you feel better. You need to take the full course of antibiotics. · Drink extra water and other fluids for the next day or two. This may help wash out the bacteria that are causing the infection. (If you have kidney, heart, or liver disease and have to limit fluids, talk with your doctor before you increase your fluid intake.)  · Avoid drinks that are carbonated or have caffeine. They can irritate the bladder. · Urinate often. Try to empty your bladder each time. · To relieve pain, take a hot bath or lay a heating pad set on low over your lower belly or genital area. Never go to sleep with a heating pad in place. To prevent UTIs  · Drink plenty of water each day. This helps you urinate often, which clears bacteria from your system. (If you have kidney, heart, or liver disease and have to limit fluids, talk with your doctor before you increase your fluid intake.)  · Urinate when you need to. · Urinate right after you have sex. · Change sanitary pads often.   · Avoid douches, bubble baths, feminine hygiene sprays, and other feminine hygiene products that have deodorants. · After going to the bathroom, wipe from front to back. When should you call for help? Call your doctor now or seek immediate medical care if:    · Symptoms such as fever, chills, nausea, or vomiting get worse or appear for the first time.     · You have new pain in your back just below your rib cage. This is called flank pain.     · There is new blood or pus in your urine.     · You have any problems with your antibiotic medicine.    Watch closely for changes in your health, and be sure to contact your doctor if:    · You are not getting better after taking an antibiotic for 2 days.     · Your symptoms go away but then come back. Where can you learn more? Go to http://mario-barry.info/. Enter C761 in the search box to learn more about \"Urinary Tract Infection in Women: Care Instructions. \"  Current as of: December 19, 2018  Content Version: 12.2  © 9856-6845 [employer]. Care instructions adapted under license by [employer] (which disclaims liability or warranty for this information). If you have questions about a medical condition or this instruction, always ask your healthcare professional. [address] any warranty or liability for your use of this information.

## 2020-03-03 DIAGNOSIS — G43.719 CHRONIC MIGRAINE WITHOUT AURA, INTRACTABLE, WITHOUT STATUS MIGRAINOSUS: ICD-10-CM

## 2020-03-06 RX ORDER — TOPIRAMATE 100 MG/1
100 CAPSULE, EXTENDED RELEASE ORAL DAILY
Qty: 30 CAP | Refills: 5 | OUTPATIENT
Start: 2020-03-06

## 2020-03-22 DIAGNOSIS — T78.40XA ALLERGIC STATE, INITIAL ENCOUNTER: ICD-10-CM

## 2020-03-24 RX ORDER — MINERAL OIL
ENEMA (ML) RECTAL
Qty: 90 TAB | Refills: 3 | Status: SHIPPED | OUTPATIENT
Start: 2020-03-24 | End: 2021-01-13

## 2020-04-01 DIAGNOSIS — R05.9 COUGH: ICD-10-CM

## 2020-04-01 RX ORDER — BENZONATATE 200 MG/1
CAPSULE ORAL
Qty: 30 CAP | Refills: 0 | Status: SHIPPED | OUTPATIENT
Start: 2020-04-01 | End: 2020-09-21

## 2020-06-25 DIAGNOSIS — G44.229 CHRONIC TENSION-TYPE HEADACHE, NOT INTRACTABLE: ICD-10-CM

## 2020-07-03 RX ORDER — BUTALBITAL, ACETAMINOPHEN AND CAFFEINE 300; 40; 50 MG/1; MG/1; MG/1
CAPSULE ORAL
Qty: 60 CAP | Refills: 0 | Status: SHIPPED | OUTPATIENT
Start: 2020-07-03 | End: 2020-08-27 | Stop reason: CLARIF

## 2020-07-29 ENCOUNTER — TELEPHONE (OUTPATIENT)
Dept: NEUROLOGY | Age: 50
End: 2020-07-29

## 2020-08-24 ENCOUNTER — TELEPHONE (OUTPATIENT)
Dept: NEUROLOGY | Age: 50
End: 2020-08-24

## 2020-08-27 ENCOUNTER — OFFICE VISIT (OUTPATIENT)
Dept: NEUROLOGY | Age: 50
End: 2020-08-27
Payer: COMMERCIAL

## 2020-08-27 VITALS
DIASTOLIC BLOOD PRESSURE: 68 MMHG | WEIGHT: 181 LBS | TEMPERATURE: 97 F | HEIGHT: 66 IN | BODY MASS INDEX: 29.09 KG/M2 | HEART RATE: 90 BPM | RESPIRATION RATE: 20 BRPM | SYSTOLIC BLOOD PRESSURE: 128 MMHG | OXYGEN SATURATION: 97 %

## 2020-08-27 DIAGNOSIS — G44.229 CHRONIC TENSION-TYPE HEADACHE, NOT INTRACTABLE: ICD-10-CM

## 2020-08-27 DIAGNOSIS — M54.2 CERVICALGIA: ICD-10-CM

## 2020-08-27 DIAGNOSIS — G25.0 ESSENTIAL TREMOR: ICD-10-CM

## 2020-08-27 DIAGNOSIS — M54.81 BILATERAL OCCIPITAL NEURALGIA: ICD-10-CM

## 2020-08-27 DIAGNOSIS — G43.719 CHRONIC MIGRAINE WITHOUT AURA, INTRACTABLE, WITHOUT STATUS MIGRAINOSUS: Primary | ICD-10-CM

## 2020-08-27 PROCEDURE — 99215 OFFICE O/P EST HI 40 MIN: CPT | Performed by: PSYCHIATRY & NEUROLOGY

## 2020-08-27 RX ORDER — BUTALBITAL, ACETAMINOPHEN AND CAFFEINE 50; 325; 40 MG/1; MG/1; MG/1
1 TABLET ORAL
Qty: 60 TAB | Refills: 0 | Status: SHIPPED | OUTPATIENT
Start: 2020-08-27 | End: 2020-12-29 | Stop reason: SDUPTHER

## 2020-08-27 RX ORDER — CYCLOBENZAPRINE HCL 10 MG
10 TABLET ORAL
Qty: 30 TAB | Refills: 3 | Status: SHIPPED | OUTPATIENT
Start: 2020-08-27 | End: 2021-05-06 | Stop reason: SINTOL

## 2020-08-27 RX ORDER — DICLOFENAC POTASSIUM 50 MG/1
1 POWDER, FOR SOLUTION ORAL
Qty: 9 PACKET | Refills: 2 | Status: SHIPPED | OUTPATIENT
Start: 2020-08-27 | End: 2020-12-29 | Stop reason: SDUPTHER

## 2020-08-27 NOTE — LETTER
8/28/20    Patient: Jocelyn Nicole   YOB: 1970   Date of Visit: 8/27/2020     Gregoria Dykes MD  [address]  [address]  [facility] [facility] [address]    Dear Gregoria Dykes MD,      Thank you for referring Ms. Peyton Nicole to [facility] for evaluation. My notes for this consultation are attached. If you have questions, please do not hesitate to call me. I look forward to following your patient along with you.       Sincerely,    Tatyana Marroquin MD

## 2020-08-27 NOTE — PROGRESS NOTES
NEUROLOGY CLINIC NOTE    Patient ID:  Eliana Blanco  [id number]  11 y.o.  1970    Date of Visit:  August 27, 2020    Reason for Visit: chronic headaches, neck pain, tremors    Chief Complaint   Patient presents with    Follow-up     headaches        History of Present Illness:     Patient Active Problem List    Diagnosis Date Noted    Severe obesity (BMI 35.0-39. 9) with comorbidity ([address] 04/16/2018    Chronic insomnia 03/28/2017    Non morbid obesity 03/28/2017    Vitamin D deficiency 10/20/2015    Allergic rhinitis 06/14/2013    Hypercholesteremia 01/23/2012    Allergy 07/14/2011    Mild intermittent asthma without complication 65/61/6036    Migraines 07/14/2011    GERD (gastroesophageal reflux disease) 07/14/2011    Headache(784.0) 07/14/2011    Cerebral vascular malformation 07/14/2011     Past Medical History:   Diagnosis Date    Allergic rhinitis 6/14/2013    Asthma     Chronic insomnia 3/28/2017    GERD (gastroesophageal reflux disease)     Headache     Headache(784.0)     Hypercholesteremia 1/23/2012    Non morbid obesity 3/28/2017    Snoring     Vitamin D deficiency 10/20/2015      Past Surgical History:   Procedure Laterality Date    ENDOSCOPY, COLON, DIAGNOSTIC  12/03 & 04/09    HX GYN      Laproscopy, BTL,TVH    HX GYN      hysterectomy    HX HYSTERECTOMY      HX OTHER SURGICAL  11/22/2019    left knee repair torn meniscus       Prior to Admission medications    Medication Sig Start Date End Date Taking?  Authorizing Provider   benzonatate (TESSALON) 200 mg capsule TAKE 1 CAPSULE BY MOUTH THREE TIMES DAILY AS NEEDED FOR COUGH 4/1/20  Yes Fredy Delaney MD   fexofenadine (ALLEGRA) 180 mg tablet TAKE 1 TABLET BY MOUTH DAILY AS NEEDED FOR ALLERGIES 3/24/20  Yes Fredy Delaney MD   traZODone (DESYREL) 100 mg tablet TAKE 1 TABLET BY MOUTH NIGHTLY 11/13/19  Yes Fredy Delaney MD   albuterol (PROVENTIL HFA, VENTOLIN HFA, PROAIR HFA) 90 mcg/actuation inhaler Take 2 Puffs by inhalation every four (4) hours as needed for Wheezing or Shortness of Breath. 11/13/19  Yes Zayra Chand MD   fluticasone propionate (FLONASE) 50 mcg/actuation nasal spray 2 Sprays by Both Nostrils route daily. 11/13/19  Yes Zayra Chand MD   topiramate ER (TROKENDI XR) 50 mg capsule Take 1 Cap by mouth daily. In addition to 100 mg daily for a total of 150 mg daily. 7/5/19  Yes Jillian Jesus MD   topiramate ER (TROKENDI XR) 100 mg capsule Take 1 Cap by mouth daily. 2/7/19  Yes Jillian Jesus MD   omeprazole (PRILOSEC) 40 mg capsule Take 1 Cap by mouth daily. 7/21/18  Yes Tray Goel MD   albuterol-ipratropium (DUO-NEB) 2.5 mg-0.5 mg/3 ml nebu 3 mL by Nebulization route every four (4) hours as needed. 12/13/17  Yes Zayra Chand MD   butalbital-acetaminophen-caff (FIORICET) -40 mg per capsule TAKE 1 CAPSULE BY MOUTH EVERY 6 HOURS AS NEEDED FOR HEADACHE 7/3/20   Jillian Jesus MD   CARAFATE 100 mg/mL suspension TK 10 ML PO QID 9/8/19   Provider, Historical   ibuprofen (MOTRIN) 600 mg tablet Take 1 Tab by mouth every six (6) hours as needed for Pain. Do not take if you have taken Diana Buddy in the last 12 hours 8/4/19   Rima RAMIREZ NP   Diclofenac Potassium (CAMBIA) 50 mg pwpk Take 1 Package by mouth daily as needed (headache).  5/31/19   Jillian Jesus MD   valACYclovir (VALTREX) 500 mg tablet TK 1 T PO  QD 12/23/18   Provider, Historical     Allergies   Allergen Reactions    Latex Rash    Aspirin Other (comments)     \"It doesn't digest in my system\"      Social History     Tobacco Use    Smoking status: Never Smoker    Smokeless tobacco: Never Used   Substance Use Topics    Alcohol use: Yes     Comment: Occasionally      Family History   Problem Relation Age of Onset    Hypertension Father     Kidney Disease Maternal Grandmother     Hypertension Maternal Grandmother     Heart Attack Paternal Grandfather     Hypertension Mother     Cancer Maternal Grandfather         lung        Subjective:      Naldo Padilla is a 51 y.o. RHAA female with history of chronic migraines, chronic tension headache, tremors and hyperlipidemia who is here for a follow up visit. Patient was a previous patient at [facility]rology [facility] [address] [place] [address] neurology. Patient was being seen for headaches. Onset 15 years ago. Severity: moderate to severe. It occurs daily, has more than one hour duration and is unchanged. Location was bitemporal and occipital. There was radiation to the neck and shoulders. The patient describes it as pressure and ache. Timing of headache: upon awakening. Denies aggravating factors. Denies relieving factors. Associated symptoms include phonophobia. Pertinent negatives include blurred vision, family history of migraine, nausea, photophobia, vertigo and vomiting. Per patient condition started since 2002. Previously seen by neurosurgery who did a brain MRI last 3/27/2002 which showed a small venous angioma right occipital lobe. Acute onset of two types of headache. One that is pressure like. 9/10 at the back of her head (R>L) and the other bitemporal achy like that is 5/10 in intensity. It lasts for hours but she does have headache free periods during the day. Associated with slowed thought process and noise sensitivity. Brain MRI done with and without contrast 10/20/2011 which was unremarkable except for the persistent right occipital venous anomaly. Patient was diagnosed with occipital neuralgia, chronic tension-type headache and congenital anomaly of the cerebrovascular system. When patient was seen at [facility] on 5/9/2012 she reported compliance with her medication regimen and she was having significant benefit. She was not having any major headaches and rarely uses the Fioricet. She was taking Propranolol 10 mg BID and Amitriptyline 30 mg at bedtime. Amitriptyline was helping her sleep.  She also was evaluated last 10/29/14 and 12/31/2014 for dizziness and vertigo. She was seen by Dr. Rochelle Hickman (ENT) and was cleared. Patient was known to be none compliant with follow up visit when she is well. She was last seen at [facility] 5/28/2015, then she reported improvement of her headaches in the sense that they are less intense but it still occurs everyday. Patient has previous tried Ibuprofen and Naproxen without any relief. The Cambia helps abort it immediately. BUPAP helps relieve it after 15 to 30 minutes and she takes it everyday. Denies any side effects. The headaches however recurs. She is also sleeping better with the Temazepam 30 mg at bedtime. Denies any sedating or cognitive side effects. She tries to correct her posture. She has been doing the neck and shoulder exercises. Plan was to continue her regimen. Since December 2017, patient reports worsening of her headache again. She apparently has been in and out of the hospital [facility] from end of December 2017 to first week of January for respiratory issues with multiple prednisone treatments. The last 1/12/2018 patient was at the [address] [place] [address] ER for severe headaches and underwent lumbar puncture. CSF studies were unremarkable. Elevated WBC (14.91). Since then, patient has been having abrupt onset when sitting or standing of bitemporal and bilateral occiput pain. Pressure like with neck stiffness. 8/10. Olevia Anger takes the edge off it. Associated with hot flashes and slight blurring of vision. Resolves with laying down. Patient did go back to the ER at [address] [place] [address]. She did get a blood patch. It helped improve the positional headache and neck stiffness. Patient underwent a brain MRI with and without contrast 1/29/2018 was normal. No abnormal enhancements. Since her last visit on 9/10/2019, patient reports worsening headache frequency and intensity since running out of trokendi last month. Patient has been having daily headaches for the past several weeks. Last severe headache was today.  She also ran out of Fioricet and Cambia which helped abort her headache previously. Now she just waits for it to pass. She also reports that her hand tremors are more intense. It still does not affect her activities of daily living. She continues to take Propranolol 10 mg BID. Denies any side effects. She continues to have intermittent neck and shoulder pains. She apparently was furloughed from her job the past several months. She reports left knee injury last 11/2019 and under left knee surgery then with benefit. Currently complaining of 7/10 headache. Outside reports reviewed: none    Review of Systems:    A comprehensive review of systems was negative except for those mentioned in the history    Objective:     Visit Vitals  /68   Pulse 90   Temp 97 °F (36.1 °C)   Resp 20   Ht 5' 5.5\" (1.664 m)   Wt 82.1 kg (181 lb)   SpO2 97%   BMI 29.66 kg/m²     7/10 headache    PHYSICAL EXAM:    NEUROLOGICAL EXAM:    Appearance: The patient is overweight, provides a coherent history and is in no acute distress. Mental Status: Oriented to time, place and person. Fluent, no aphasia or dysarthria. Mood and affect appropriate. Cranial Nerves:   II - XII were intact. Motor:  5/5 strength. Normal bulk and tone. No fasciculations. Reflexes:   Deep tendon reflexes were symmetrical.   Sensory:   Normal to cold, pinprick and vibration. Gait:  Normal gait. No Romberg. Tremor:   Mild postural UE hand tremors noted today. No cogwheel rigidity. Cerebellar:  Intact FTN/CASPER/HTS. Anterior head posture (2-3 FB) with shoulders rotated in       Assessment:   Chronic migraine intractable without aura - worsening  Chronic tension type headache - worsening  Essential tremors  - worsening  Cervicalgia - worsening  Occipital neuralgia - worsening    Plan:   1. Neurology exam is unchanged. It is still non-focal. Previous brain MRI with and without contrast was normal. Restart Trokendi XR total of 150 mg total daily. Prescription was provided.  Restart Cambia for abortive therapy. Prescription was provided. Advised to refrain from things that could trigger a headache (dairy, chocolate, wine and etc.). Advised to keep a headache log. 2. Continue Propranolol 10 mg BID. Restart Fioricet for abortive therapy. Prescription was provided. 3. Mild postural UE tremors today. No intentional or resting hand tremors. No tone changes or cogwheel rigidity. No bradykinesia. No evidence at this time to support a diagnosis of Parkinson's disease. Findings consistent with essential tremors. Normal brain MRI. Current regimen of Propranolol and Trokendi are actually medications used to treat tremors and should offer benefit. 4. Neck pain due to poor anterior head posture. Encouraged to continue to correct her anterior head posture. Advised to do the neck and shoulder exercises taught by therapy. Trial of Cyclobenzaprine 10 mg at bedtime. Prescription was provided. 5.  Exam reveals elements of emerging bilateral occipital neuralgia due to poor anterior head posture. Advised to correct her anterior head posture. Use headrest at work, at home and while driving. Do not carry anything on the shoulder. Use wireless headsets. Use only one pillow at most when sleeping. Advised to obtain helmet type head massager. Potential bilateral occipital nerve blocks if condition worsens. Trial of muscle relaxant as above. All questions and concerns were answered. This note was created using voice recognition software. Despite editing, there may be syntax errors.

## 2020-08-27 NOTE — PATIENT INSTRUCTIONS
Migraine Headache: Care Instructions  Your Care Instructions  Migraines are painful, throbbing headaches that often start on one side of the head. They may cause nausea and vomiting and make you sensitive to light, sound, or smell. Without treatment, migraines can last from 4 hours to a few days. Medicines can help prevent migraines or stop them after they have started. Your doctor can help you find which ones work best for you. Follow-up care is a key part of your treatment and safety. Be sure to make and go to all appointments, and call your doctor if you are having problems. It's also a good idea to know your test results and keep a list of the medicines you take. How can you care for yourself at home? · Do not drive if you have taken a prescription pain medicine. · Rest in a quiet, dark room until your headache is gone. Close your eyes, and try to relax or go to sleep. Don't watch TV or read. · Put a cold, moist cloth or cold pack on the painful area for 10 to 20 minutes at a time. Put a thin cloth between the cold pack and your skin. · Use a warm, moist towel or a heating pad set on low to relax tight shoulder and neck muscles. · Have someone gently massage your neck and shoulders. · Take your medicines exactly as prescribed. Call your doctor if you think you are having a problem with your medicine. You will get more details on the specific medicines your doctor prescribes. · Don't take medicine for headache pain too often. Talk to your doctor if you are taking medicine more than 2 days a week to stop a headache. Taking too much pain medicine can lead to more headaches. These are called medicine-overuse headaches. To prevent migraines  · Keep a headache diary so you can figure out what triggers your headaches. Avoiding triggers may help you prevent headaches. Record when each headache began, how long it lasted, and what the pain was like.  Write down any other symptoms you had with the headache, such as nausea, flashing lights or dark spots, or sensitivity to bright light or loud noise. Note if the headache occurred near your period. List anything that might have triggered the headache. Triggers may include certain foods (chocolate, cheese, wine) or odors, smoke, bright light, stress, or lack of sleep. · If your doctor has prescribed medicine for your migraines, take it as directed. You may have medicine that you take only when you get a migraine and medicine that you take all the time to help prevent migraines. ? If your doctor has prescribed medicine for when you get a headache, take it at the first sign of a migraine, unless your doctor has given you other instructions. ? If your doctor has prescribed medicine to prevent migraines, take it exactly as prescribed. Call your doctor if you think you are having a problem with your medicine. · Find healthy ways to deal with stress. Migraines are most common during or right after stressful times. Try finding ways to reduce stress like practicing mindfulness or deep breathing exercises. · Get plenty of sleep and exercise. But be careful to not push yourself too hard during exercise. It may trigger a headache. · Eat meals on a regular schedule. Avoid foods and drinks that often trigger migraines. These include chocolate, alcohol (especially red wine and port), aspartame, monosodium glutamate (MSG), and some additives found in foods (such as hot dogs, bronson, cold cuts, aged cheeses, and pickled foods). · Limit caffeine. Don't drink too much coffee, tea, or soda. But don't quit caffeine suddenly. That can also give you migraines. · Do not smoke or allow others to smoke around you. If you need help quitting, talk to your doctor about stop-smoking programs and medicines. These can increase your chances of quitting for good. · If you are taking birth control pills or hormone therapy, talk to your doctor about whether they are triggering your migraines.   When should you call for help? [id number] anytime you think you may need emergency care. For example, call if:  · You have signs of a stroke. These may include:  ? Sudden numbness, paralysis, or weakness in your face, arm, or leg, especially on only one side of your body. ? Sudden vision changes. ? Sudden trouble speaking. ? Sudden confusion or trouble understanding simple statements. ? Sudden problems with walking or balance. ? A sudden, severe headache that is different from past headaches. Call your doctor now or seek immediate medical care if:  · You have new or worse nausea and vomiting. · You have a new or higher fever. · Your headache gets much worse. Watch closely for changes in your health, and be sure to contact your doctor if:  · You are not getting better after 2 days (48 hours). Where can you learn more? Go to http://marioKydaemosbarry.info/  Enter I694 in the search box to learn more about \"Migraine Headache: Care Instructions. \"  Current as of: November 20, 2019               Content Version: 12.5  © 2201-4096 [employer]. Care instructions adapted under license by [employer] (which disclaims liability or warranty for this information). If you have questions about a medical condition or this instruction, always ask your healthcare professional. [address] any warranty or liability for your use of this information. Benign Essential Tremor: Care Instructions  Your Care Instructions     Benign essential tremor is a medical term for shaking that you can't control. Your hand or fingers may shake when you lift a cup or point at something. Or your voice may shake when you speak. This type of tremor is not harmful. It is not caused by a stroke or Parkinson's disease. Some things can affect how much you shake. For example, drinking or eating something with caffeine may make tremors worse for a while.  Some medicines also can increase tremors. These include antidepressants and too much thyroid replacement. Talk to your doctor if you think one of your medicines makes your tremors worse. If you are self-conscious about your tremors, there are some things you can do to reduce them or make them less noticeable. This includes taking medicine. Follow-up care is a key part of your treatment and safety. Be sure to make and go to all appointments, and call your doctor if you are having problems. It's also a good idea to know your test results and keep a list of the medicines you take. How can you care for yourself at home? · Take your medicines exactly as prescribed. Call your doctor if you think you are having a problem with your medicine. Some medicines that help control tremors have to be taken every day, even if you are not having tremors. You will get more details on the specific medicines your doctor prescribes. · Get plenty of rest.  · Eat a balanced, healthy diet. · Try to reduce stress. Regular exercise and massages may help. · Limit alcohol. Heavy drinking can make your tremors worse. · Avoid drinks or foods with caffeine if they make your tremors worse. These include tea, cola, coffee, and chocolate. · Wear a heavy bracelet or watch. This adds a little weight to your hand. The extra weight may reduce tremors. · Drink from cups or glasses that are only half full. You may also want to try drinking with a straw. When should you call for help? Watch closely for changes in your health, and be sure to contact your doctor if:  · You notice your tremors are getting worse. · You can't do your everyday activities because of your tremors. · You are sad and embarrassed about your shaking. Where can you learn more? Go to http://mario-barry.info/  Enter B746 in the search box to learn more about \"Benign Essential Tremor: Care Instructions. \"  Current as of: November 20, 2019               Content Version: 12.5  © 2346-9258 [employer]. Care instructions adapted under license by [employer] (which disclaims liability or warranty for this information). If you have questions about a medical condition or this instruction, always ask your healthcare professional. [place] [address] any warranty or liability for your use of this information. Neck: Exercises  Introduction  Here are some examples of exercises for you to try. The exercises may be suggested for a condition or for rehabilitation. Start each exercise slowly. Ease off the exercises if you start to have pain. You will be told when to start these exercises and which ones will work best for you. How to do the exercises  Neck stretch   1. This stretch works best if you keep your shoulder down as you lean away from it. To help you remember to do this, start by relaxing your shoulders and lightly holding on to your thighs or your chair. 2. Tilt your head toward your shoulder and hold for 15 to 30 seconds. Let the weight of your head stretch your muscles. 3. If you would like a little added stretch, use your hand to gently and steadily pull your head toward your shoulder. For example, keeping your right shoulder down, lean your head to the left. 4. Repeat 2 to 4 times toward each shoulder. Diagonal neck stretch   1. Turn your head slightly toward the direction you will be stretching, and tilt your head diagonally toward your chest and hold for 15 to 30 seconds. 2. If you would like a little added stretch, use your hand to gently and steadily pull your head forward on the diagonal.  3. Repeat 2 to 4 times toward each side. Dorsal glide stretch   The dorsal glide stretches the back of the neck. If you feel pain, do not glide so far back. Some people find this exercise easier to do while lying on their backs with an ice pack on the neck. 1. Sit or stand tall and look straight ahead.   2. Slowly tuck your chin as you glide your head backward over your body  3. Hold for a count of 6, and then relax for up to 10 seconds. 4. Repeat 8 to 12 times. Chest and shoulder stretch   1. Sit or stand tall and glide your head backward as in the dorsal glide stretch. 2. Raise both arms so that your hands are next to your ears. 3. Take a deep breath, and as you breathe out, lower your elbows down and behind your back. You will feel your shoulder blades slide down and together, and at the same time you will feel a stretch across your chest and the front of your shoulders. 4. Hold for about 6 seconds, and then relax for up to 10 seconds. 5. Repeat 8 to 12 times. Strengthening: Hands on head   1. Move your head backward, forward, and side to side against gentle pressure from your hands, holding each position for about 6 seconds. 2. Repeat 8 to 12 times. Follow-up care is a key part of your treatment and safety. Be sure to make and go to all appointments, and call your doctor if you are having problems. It's also a good idea to know your test results and keep a list of the medicines you take. Where can you learn more? Go to http://www.pennington.com/  Enter P975 in the search box to learn more about \"Neck: Exercises. \"  Current as of: March 2, 2020               Content Version: 12.5  © 7838-3244 Health[employer] Inc[employer]. Care instructions adapted under license by [employer] (which disclaims liability or warranty for this information). If you have questions about a medical condition or this instruction, always ask your healthcare professional. [place] [address] any warranty or liability for your use of this information. Neck Pain: Care Instructions  Your Care Instructions     You can have neck pain anywhere from the bottom of your head to the top of your shoulders. It can spread to the upper back or arms.  Injuries, painting a ceiling, sleeping with your neck twisted, staying in one position for too long, and many other activities can cause neck pain. Most neck pain gets better with home care. Your doctor may recommend medicine to relieve pain or relax your muscles. He or she may suggest exercise and physical therapy to increase flexibility and relieve stress. You may need to wear a special (cervical) collar to support your neck for a day or two. Follow-up care is a key part of your treatment and safety. Be sure to make and go to all appointments, and call your doctor if you are having problems. It's also a good idea to know your test results and keep a list of the medicines you take. How can you care for yourself at home? · Try using a heating pad on a low or medium setting for 15 to 20 minutes every 2 or 3 hours. Try a warm shower in place of one session with the heating pad. · You can also try an ice pack for 10 to 15 minutes every 2 to 3 hours. Put a thin cloth between the ice and your skin. · Take pain medicines exactly as directed. ? If the doctor gave you a prescription medicine for pain, take it as prescribed. ? If you are not taking a prescription pain medicine, ask your doctor if you can take an over-the-counter medicine. · If your doctor recommends a cervical collar, wear it exactly as directed. When should you call for help? Call your doctor now or seek immediate medical care if:  · You have new or worsening numbness in your arms, buttocks or legs. · You have new or worsening weakness in your arms or legs. (This could make it hard to stand up.)  · You lose control of your bladder or bowels. Watch closely for changes in your health, and be sure to contact your doctor if:  · Your neck pain is getting worse. · You are not getting better after 1 week. · You do not get better as expected. Where can you learn more? Go to http://mario-barry.info/  Enter V723 in the search box to learn more about \"Neck Pain: Care Instructions. \"  Current as of: March 2, 2020               Content Version: 12.5  © 4629-5792 Healthwise, Inc[employer]. Care instructions adapted under license by [employer] (which disclaims liability or warranty for this information). If you have questions about a medical condition or this instruction, always ask your healthcare professional. [address] any warranty or liability for your use of this information.

## 2020-08-27 NOTE — PROGRESS NOTES
Headaches have been terrible, they are in her temple and behind her eyes  She goes to sleep with them and sometimes wakes up with them

## 2020-09-21 DIAGNOSIS — R05.9 COUGH: ICD-10-CM

## 2020-09-21 RX ORDER — BENZONATATE 200 MG/1
CAPSULE ORAL
Qty: 30 CAP | Refills: 0 | Status: SHIPPED | OUTPATIENT
Start: 2020-09-21 | End: 2021-01-13

## 2020-12-15 ENCOUNTER — TELEPHONE (OUTPATIENT)
Dept: NEUROLOGY | Age: 50
End: 2020-12-15

## 2020-12-15 ENCOUNTER — TRANSCRIBE ORDER (OUTPATIENT)
Dept: INTERNAL MEDICINE CLINIC | Age: 50
End: 2020-12-15

## 2020-12-15 DIAGNOSIS — G43.719 CHRONIC MIGRAINE WITHOUT AURA, INTRACTABLE, WITHOUT STATUS MIGRAINOSUS: ICD-10-CM

## 2020-12-15 NOTE — TELEPHONE ENCOUNTER
----- Message from Chano Polo Page sent at 12/15/2020  1:52 PM EST -----  Regarding: Dr. Elliott Amador (if not patient): n/a      Relationship of caller (if not patient):  n/a      Best contact number(s): [phone]      Name of medication and dosage if known: Trokendi  mg, Trokendi  XR 50 mg, Propanolol      Is patient out of this medication (yes/no): Yes      Pharmacy name:  [facility] [address] listed in chart? (yes/no): Yes  Pharmacy phone number:      Details to clarify the request: Patient is checking to see if medication os covered under  the new insurance that was added to the profile      Sabiha Bassett

## 2020-12-17 DIAGNOSIS — F51.04 CHRONIC INSOMNIA: ICD-10-CM

## 2020-12-20 RX ORDER — TRAZODONE HYDROCHLORIDE 100 MG/1
TABLET ORAL
Qty: 30 TAB | Refills: 0 | Status: SHIPPED | OUTPATIENT
Start: 2020-12-20 | End: 2021-01-13 | Stop reason: SDUPTHER

## 2020-12-21 ENCOUNTER — TELEPHONE (OUTPATIENT)
Dept: NEUROLOGY | Age: 50
End: 2020-12-21

## 2020-12-21 NOTE — TELEPHONE ENCOUNTER
RE TROKENDI 50MG     STATUS   TROKENDI XR 50 MG CAP ER 24H is covered for this Patient without Prior Authorization.     INDICATIONS AND USAGE   Jeremias Sara is indicated for:   Monotherapy epilepsy: initial monotherapy in patients 10years of age and older with partial onset or primary generalized tonic-clonic seizures   Adjunctive therapy epilepsy: adjunctive therapy in patients 10years of age and older with partial onset, primary generalized tonic-clonic seizures, or seizures associated with Lennox-Gastaut syndrome (LGS)   Migraine: Prophylaxis of migraine headache in adults and adolescents 15years of age and older

## 2020-12-21 NOTE — TELEPHONE ENCOUNTER
[facility]    STATUS APPROVED    [place] VIA CMM Key: [id number] - [place] Case ID: [id number] - Rx #: [id number]     EFFECTIVE  12/21/20

## 2020-12-29 ENCOUNTER — OFFICE VISIT (OUTPATIENT)
Dept: NEUROLOGY | Age: 50
End: 2020-12-29
Payer: MEDICAID

## 2020-12-29 VITALS — OXYGEN SATURATION: 100 % | SYSTOLIC BLOOD PRESSURE: 112 MMHG | DIASTOLIC BLOOD PRESSURE: 64 MMHG | HEART RATE: 72 BPM

## 2020-12-29 DIAGNOSIS — G43.719 CHRONIC MIGRAINE WITHOUT AURA, INTRACTABLE, WITHOUT STATUS MIGRAINOSUS: Primary | ICD-10-CM

## 2020-12-29 DIAGNOSIS — M54.2 CERVICALGIA: ICD-10-CM

## 2020-12-29 DIAGNOSIS — G25.0 ESSENTIAL TREMOR: ICD-10-CM

## 2020-12-29 DIAGNOSIS — G44.229 CHRONIC TENSION-TYPE HEADACHE, NOT INTRACTABLE: ICD-10-CM

## 2020-12-29 DIAGNOSIS — M54.81 BILATERAL OCCIPITAL NEURALGIA: ICD-10-CM

## 2020-12-29 PROCEDURE — 99215 OFFICE O/P EST HI 40 MIN: CPT | Performed by: PSYCHIATRY & NEUROLOGY

## 2020-12-29 RX ORDER — DICLOFENAC POTASSIUM 50 MG/1
1 POWDER, FOR SOLUTION ORAL
Qty: 9 PACKET | Refills: 2 | Status: SHIPPED | OUTPATIENT
Start: 2020-12-29 | End: 2021-01-13

## 2020-12-29 RX ORDER — PROPRANOLOL HYDROCHLORIDE 10 MG/1
10 TABLET ORAL 2 TIMES DAILY
COMMUNITY
End: 2021-01-14 | Stop reason: SDUPTHER

## 2020-12-29 RX ORDER — BUTALBITAL, ACETAMINOPHEN AND CAFFEINE 50; 325; 40 MG/1; MG/1; MG/1
1 TABLET ORAL
Qty: 60 TAB | Refills: 0 | Status: SHIPPED | OUTPATIENT
Start: 2020-12-29 | End: 2021-06-25

## 2020-12-29 NOTE — PROGRESS NOTES
NEUROLOGY CLINIC NOTE    Patient ID:  Holli Martell  [id number]  32 y.o.  1970    Date of Visit:  December 29, 2020    Reason for Visit: chronic headaches, neck pain, tremors    Chief Complaint   Patient presents with    Headache     worse lately due to stress    Neck Pain     a little better       History of Present Illness:     Patient Active Problem List    Diagnosis Date Noted    Severe obesity (BMI 35.0-39. 9) with comorbidity ([address] 04/16/2018    Chronic insomnia 03/28/2017    Non morbid obesity 03/28/2017    Vitamin D deficiency 10/20/2015    Allergic rhinitis 06/14/2013    Hypercholesteremia 01/23/2012    Allergy 07/14/2011    Mild intermittent asthma without complication 28/25/2471    Migraines 07/14/2011    GERD (gastroesophageal reflux disease) 07/14/2011    Headache(784.0) 07/14/2011    Cerebral vascular malformation 07/14/2011     Past Medical History:   Diagnosis Date    Allergic rhinitis 6/14/2013    Asthma     Chronic insomnia 3/28/2017    GERD (gastroesophageal reflux disease)     Headache     Headache(784.0)     Hypercholesteremia 1/23/2012    Non morbid obesity 3/28/2017    Snoring     Vitamin D deficiency 10/20/2015      Past Surgical History:   Procedure Laterality Date    ENDOSCOPY, COLON, DIAGNOSTIC  12/03 & 04/09    HX GYN      Laproscopy, BTL,TVH    HX GYN      hysterectomy    HX HYSTERECTOMY      HX OTHER SURGICAL  11/22/2019    left knee repair torn meniscus       Prior to Admission medications    Medication Sig Start Date End Date Taking? Authorizing Provider   propranoloL (INDERAL) 10 mg tablet Take 10 mg by mouth two (2) times a day. Yes Provider, Historical   traZODone (DESYREL) 100 mg tablet TAKE 1 TABLET BY MOUTH NIGHTLY 12/20/20  Yes Xochilt Foster MD   topiramate ER (Trokendi XR) 100 mg capsule Take 1 Cap by mouth daily. 12/17/20  Yes Britton Fink MD   topiramate ER (Trokendi XR) 50 mg capsule Take 1 Cap by mouth daily.  In addition to 100 mg daily for a total of 150 mg daily. 12/17/20  Yes Britton Fink MD   benzonatate (TESSALON) 200 mg capsule TAKE 1 CAPSULE BY MOUTH THREE TIMES DAILY AS NEEDED FOR COUGH 9/21/20  Yes Xochilt Foster MD   Diclofenac Potassium (Cambia) 50 mg pwpk Take 1 Package by mouth daily as needed (headache). 8/27/20  Yes Britton Fink MD   butalbital-acetaminophen-caffeine (FIORICET, ESGIC) -40 mg per tablet Take 1 Tab by mouth every six (6) hours as needed for Headache. 8/27/20  Yes Britton Fink MD   cyclobenzaprine (FLEXERIL) 10 mg tablet Take 1 Tab by mouth nightly. 8/27/20  Yes Britton Fink MD   fexofenadine (ALLEGRA) 180 mg tablet TAKE 1 TABLET BY MOUTH DAILY AS NEEDED FOR ALLERGIES 3/24/20  Yes Xochilt Foster MD   albuterol (PROVENTIL HFA, VENTOLIN HFA, PROAIR HFA) 90 mcg/actuation inhaler Take 2 Puffs by inhalation every four (4) hours as needed for Wheezing or Shortness of Breath. 11/13/19  Yes Xochilt Foster MD   fluticasone propionate (FLONASE) 50 mcg/actuation nasal spray 2 Sprays by Both Nostrils route daily. 11/13/19  Yes Xochilt Foster MD   omeprazole (PRILOSEC) 40 mg capsule Take 1 Cap by mouth daily. 7/21/18  Yes Uche Stacy MD   albuterol-ipratropium (DUO-NEB) 2.5 mg-0.5 mg/3 ml nebu 3 mL by Nebulization route every four (4) hours as needed. 12/13/17  Yes Xochilt Foster MD   CARAFATE 100 mg/mL suspension TK 10 ML PO QID 9/8/19   Provider, Historical   ibuprofen (MOTRIN) 600 mg tablet Take 1 Tab by mouth every six (6) hours as needed for Pain.  Do not take if you have taken [place] in the last 12 hours 8/4/19   Sara Elias NP   valACYclovir (VALTREX) 500 mg tablet TK 1 T PO  QD 12/23/18   Provider, Historical     Allergies   Allergen Reactions    Latex Rash    Aspirin Other (comments)     \"It doesn't digest in my system\"      Social History     Tobacco Use    Smoking status: Never Smoker    Smokeless tobacco: Never Used   Substance Use Topics    Alcohol use: Yes     Comment: Occasionally      Family History   Problem Relation Age of Onset    Hypertension Father     Kidney Disease Maternal Grandmother     Hypertension Maternal Grandmother     Heart Attack Paternal Grandfather     Hypertension Mother     Cancer Maternal Grandfather         lung        Subjective:      Landen Escoto is a 53 y.o. RHAA female with history of chronic migraines, chronic tension headache, tremors and hyperlipidemia who is here for a follow up visit. Patient was a previous patient at [facility] [place] [address] and [facility] neurology. Patient was being seen for headaches. Onset 15 years ago. Severity: moderate to severe. It occurs daily, has more than one hour duration and is unchanged. Location was bitemporal and occipital. There was radiation to the neck and shoulders. The patient describes it as pressure and ache. Timing of headache: upon awakening. Denies aggravating factors. Denies relieving factors. Associated symptoms include phonophobia. Pertinent negatives include blurred vision, family history of migraine, nausea, photophobia, vertigo and vomiting. Per patient condition started since 2002. Previously seen by neurosurgery who did a brain MRI last 3/27/2002 which showed a small venous angioma right occipital lobe. Acute onset of two types of headache. One that is pressure like. 9/10 at the back of her head (R>L) and the other bitemporal achy like that is 5/10 in intensity. It lasts for hours but she does have headache free periods during the day. Associated with slowed thought process and noise sensitivity. Brain MRI done with and without contrast 10/20/2011 which was unremarkable except for the persistent right occipital venous anomaly. Patient was diagnosed with occipital neuralgia, chronic tension-type headache and congenital anomaly of the cerebrovascular system.  When patient was seen at [facility] on 5/9/2012 she reported compliance with her medication regimen and she was having significant benefit. She was not having any major headaches and rarely uses the Fioricet. She was taking Propranolol 10 mg BID and Amitriptyline 30 mg at bedtime. Amitriptyline was helping her sleep. She also was evaluated last 10/29/14 and 12/31/2014 for dizziness and vertigo. She was seen by Dr. Oliver Beltran (ENT) and was cleared. Patient was known to be none compliant with follow up visit when she is well. She was last seen at [facility] 5/28/2015, then she reported improvement of her headaches in the sense that they are less intense but it still occurs everyday. Patient has previous tried Ibuprofen and Naproxen without any relief. The Cambia helps abort it immediately. BUPAP helps relieve it after 15 to 30 minutes and she takes it everyday. Denies any side effects. The headaches however recurs. She is also sleeping better with the Temazepam 30 mg at bedtime. Denies any sedating or cognitive side effects. She tries to correct her posture. She has been doing the neck and shoulder exercises. Plan was to continue her regimen. Since December 2017, patient reports worsening of her headache again. She apparently has been in and out of the hospital [facility] from end of December 2017 to first week of January for respiratory issues with multiple prednisone treatments. The last 1/12/2018 patient was at the  ER for severe headaches and underwent lumbar puncture. CSF studies were unremarkable. Elevated WBC (14.91). Since then, patient has been having abrupt onset when sitting or standing of bitemporal and bilateral occiput pain. Pressure like with neck stiffness. 8/10. Veria Amen takes the edge off it. Associated with hot flashes and slight blurring of vision. Resolves with laying down. Patient did go back to the ER at . She did get a blood patch. It helped improve the positional headache and neck stiffness.  Patient underwent a brain MRI with and without contrast 1/29/2018 was normal. No abnormal enhancements. Since her last visit on 8/27/2020, patient reports worsening headache frequency that she blames increase stress from continuously being out of work since the pandemic started. Her severe migraine headaches are better. She continues to take Trokendi XR a total of 150 mg daily. Denies any side effects. Cambia offers relief as abortive therapy. Fioricet offers relief for her more frequent headaches. She also reports that her hand tremors are more intense. It still does not affect her activities of daily living. She continues to take Propranolol 10 mg BID. Denies any side effects. She continues to have intermittent neck and shoulder pains. She has only been taking Cyclobenzaprine as needed instead of daily with benefit. Outside reports reviewed: none    Review of Systems:    A comprehensive review of systems was negative except for those mentioned in the history    Objective:     Visit Vitals  /64 (BP 1 Location: Right arm, BP Patient Position: Sitting)   Pulse 72   SpO2 100%     PHYSICAL EXAM:    NEUROLOGICAL EXAM:    Appearance: The patient is overweight, provides a coherent history and is in no acute distress. Mental Status: Oriented to time, place and person. Fluent, no aphasia or dysarthria. Mood and affect appropriate. Cranial Nerves:   II - XII were intact. Motor:  5/5 strength. Normal bulk and tone. No fasciculations. Reflexes:   Deep tendon reflexes were symmetrical.   Sensory:   Normal to cold, pinprick and vibration. Gait:  Normal gait. No Romberg. Tremor:   Mild postural UE hand tremors noted today. No cogwheel rigidity. Cerebellar:  Intact FTN/CASPER/HTS. Anterior head posture (2-3 FB) with shoulders rotated in       Assessment:   Chronic migraine intractable without aura - stable with exacerbations  Chronic tension type headache - worsening  Essential tremors  - stable  Cervicalgia - worsening  Occipital neuralgia - worsening    Plan:   1. Neurology exam is unchanged. It is still non-focal. Previous brain MRI with and without contrast was normal. Continue Trokendi XR total of 150 mg total daily. Prescription was recently provided. Continue Cambia for abortive therapy. Prescription was provided. Advised to refrain from things that could trigger a headache (dairy, chocolate, wine and etc.). Advised to keep a headache log. 2. Continue Propranolol 10 mg BID and Fioricet for abortive therapy. Prescription was provided. 3. Mild postural UE tremors today. No intentional or resting hand tremors. No tone changes or cogwheel rigidity. No bradykinesia. No evidence at this time to support a diagnosis of Parkinson's disease. Findings consistent with essential tremors. Normal brain MRI. Current regimen of Propranolol and Trokendi are actually medications used to treat tremors and should offer benefit. 4. Neck pain due to poor anterior head posture. Encouraged to continue to correct her anterior head posture. Advised to do the neck and shoulder exercises taught by therapy. Advised to take Cyclobenzaprine 10 mg at bedtime daily at least and see if headache and neck pain frequency reduces. 5.  Exam reveals elements of emerging bilateral occipital neuralgia due to poor anterior head posture. Advised to correct her anterior head posture. Use headrest at work, at home and while driving. Do not carry anything on the shoulder. Use wireless headsets. Use only one pillow at most when sleeping. Advised to obtain helmet type head massager. Potential bilateral occipital nerve blocks if condition worsens. Trial of muscle relaxant as above. All questions and concerns were answered. This note was created using voice recognition software. Despite editing, there may be syntax errors.

## 2020-12-29 NOTE — LETTER
12/29/2020    Patient: Susy Nicole   YOB: 1970   Date of Visit: 12/29/2020     Farhat Denson MD  [address] [place] [address]  [place] [employer]    Dear Farhat Denson MD,      Thank you for referring Ms. Peyton Nicole to [facility] for evaluation. My notes for this consultation are attached. If you have questions, please do not hesitate to call me. I look forward to following your patient along with you.       Sincerely,    Lamont Bloom MD

## 2020-12-30 NOTE — TELEPHONE ENCOUNTER
Called pt, and left a voice message, that he/she is due for their follow up appointment, here at [facility]. Advised pt to call the office back to schedule their appointment.

## 2021-01-13 ENCOUNTER — OFFICE VISIT (OUTPATIENT)
Dept: FAMILY MEDICINE CLINIC | Age: 51
End: 2021-01-13
Payer: COMMERCIAL

## 2021-01-13 VITALS
WEIGHT: 180 LBS | RESPIRATION RATE: 20 BRPM | HEIGHT: 66 IN | DIASTOLIC BLOOD PRESSURE: 66 MMHG | HEART RATE: 89 BPM | SYSTOLIC BLOOD PRESSURE: 112 MMHG | BODY MASS INDEX: 28.93 KG/M2 | TEMPERATURE: 96 F

## 2021-01-13 DIAGNOSIS — R05.9 COUGH: ICD-10-CM

## 2021-01-13 DIAGNOSIS — F51.04 CHRONIC INSOMNIA: ICD-10-CM

## 2021-01-13 DIAGNOSIS — J45.20 UNCONTROLLED INTERMITTENT ASTHMA: Primary | ICD-10-CM

## 2021-01-13 DIAGNOSIS — J30.9 ALLERGIC RHINITIS, UNSPECIFIED SEASONALITY, UNSPECIFIED TRIGGER: ICD-10-CM

## 2021-01-13 PROCEDURE — 99214 OFFICE O/P EST MOD 30 MIN: CPT | Performed by: FAMILY MEDICINE

## 2021-01-13 RX ORDER — ALBUTEROL SULFATE 90 UG/1
2 AEROSOL, METERED RESPIRATORY (INHALATION)
Qty: 1 INHALER | Refills: 0 | Status: SHIPPED | OUTPATIENT
Start: 2021-01-13 | End: 2021-02-11

## 2021-01-13 RX ORDER — TRAZODONE HYDROCHLORIDE 100 MG/1
TABLET ORAL
Qty: 30 TAB | Refills: 0 | Status: SHIPPED | OUTPATIENT
Start: 2021-01-13 | End: 2021-02-15

## 2021-01-13 RX ORDER — FEXOFENADINE HCL AND PSEUDOEPHEDRINE HCI 180; 240 MG/1; MG/1
1 TABLET, EXTENDED RELEASE ORAL DAILY
Qty: 90 TAB | Refills: 4 | Status: SHIPPED | OUTPATIENT
Start: 2021-01-13 | End: 2021-09-16 | Stop reason: ALTCHOICE

## 2021-01-13 RX ORDER — BENZONATATE 200 MG/1
CAPSULE ORAL
Qty: 30 CAP | Refills: 0 | Status: SHIPPED | OUTPATIENT
Start: 2021-01-13 | End: 2021-09-10

## 2021-01-13 NOTE — PROGRESS NOTES
Chief Complaint   Patient presents with    Medication Evaluation     medication follow up and refill

## 2021-01-13 NOTE — PROGRESS NOTES
HISTORY OF PRESENT ILLNESS  German Robledo is a 53 y.o. female. Patient presents with:  Medication Evaluation: medication follow up and refill     She needs a refill on her trazodone that she takes for sleep. Also, she needs a refill on her Tessalon. She has asthma and coughs several times a week. It is worse if she goes outside. She gets shortness of breath if she walks from her building to her car. Her albuterol inhaler is sometimes not enough. She requests a prescription for Allegra D. This works better for her allergies than Allegra. Review of Systems   Constitutional: Negative for chills and fever. HENT: Negative for congestion. Respiratory: Positive for cough, sputum production and shortness of breath. Negative for hemoptysis and wheezing. Clear sputum       Visit Vitals  /66   Pulse 89   Temp (!) 96 °F (35.6 °C) (Temporal)   Resp 20   Ht 5' 5.5\" (1.664 m)   Wt 180 lb (81.6 kg)   BMI 29.50 kg/m²       Physical Exam  Vitals signs and nursing note reviewed. Constitutional:       General: She is not in acute distress. Appearance: She is well-developed. She is not diaphoretic. Cardiovascular:      Rate and Rhythm: Normal rate and regular rhythm. Heart sounds: Normal heart sounds. No murmur. No friction rub. No gallop. Pulmonary:      Effort: Pulmonary effort is normal. No respiratory distress. Breath sounds: Normal breath sounds. No wheezing or rales. Skin:     General: Skin is warm and dry. Neurological:      Mental Status: She is alert and oriented to person, place, and time. ASSESSMENT and PLAN    ICD-10-CM ICD-9-CM    1. Uncontrolled intermittent asthma  J45.20 493.90 budesonide (PULMICORT FLEXHALER) 90 mcg/actuation aepb inhaler      albuterol (PROVENTIL HFA, VENTOLIN HFA, PROAIR HFA) 90 mcg/actuation inhaler   2. Cough  R05 786.2 benzonatate (TESSALON) 200 mg capsule   3.  Chronic insomnia  F51.04 780.52 traZODone (DESYREL) 100 mg tablet   4. Allergic rhinitis, unspecified seasonality, unspecified trigger  J30.9 477.9 fexofenadine-pseudoephedrine (ALLEGRA-D 24) 180-240 mg per tablet        Cough due to uncontrolled asthma  Start Pulmicort with prn albuterol  Tessalon prn if albuterol does not work  Stop Allegra and start Allegra D  Refills per orders    Follow-up and Dispositions    · Return in about 6 weeks (around 2/24/2021) for asthma. Reviewed plan of care. Patient has provided input and agrees with goals.

## 2021-01-14 RX ORDER — PROPRANOLOL HYDROCHLORIDE 10 MG/1
10 TABLET ORAL 2 TIMES DAILY
Qty: 60 TAB | Refills: 3 | Status: SHIPPED | OUTPATIENT
Start: 2021-01-14 | End: 2021-05-06 | Stop reason: SDUPTHER

## 2021-02-08 NOTE — MR AVS SNAPSHOT
[address] [facility]  [phone]     Patient: Shady Wright  MRN: [id number]  :1970               Visit Information     Date & Time Provider Department Dept. Phone Encounter #    2018 11:00 AM Sammy Jimenez MD [place] [address] [id number]      Follow-up Instructions     Return if symptoms worsen or fail to improve. Your Appointments     2018  1:40 PM   Follow Up with Jaimie Galindo MD   [facility] [address] [facility])   Appt Note: Follow up headaches,lsw,18    [address] [facility],  [address] [facility], [address]  [phone]           [address] [place] [address], [id number], [address] [facility] Maintenance        Date Due    PAP AKA CERVICAL CYTOLOGY 10/9/1992    Influenza Age 9 to Adult 2018    DTaP/Tdap/Td series (2 - Td) 3/28/2027      Allergies as of 2018  Review Complete On: 2018 By: Sammy Jimenez MD       Severity Noted Reaction Type Reactions    Latex Medium 2013   Topical Rash    Aspirin  2017    Other (comments)    \"It doesn't digest in my system\"      Current Immunizations  Reviewed on 3/28/2017    No immunizations on file.        Not reviewed this visit   You Were Diagnosed With        Codes Comments    Urinary body odor    -  Primary ICD-10-CM: L75.0  ICD-9-CM: 705.89     Hematuria, unspecified type     ICD-10-CM: R31.9  ICD-9-CM: 599.70     Chronic low back pain, unspecified back pain laterality, with sciatica presence unspecified     ICD-10-CM: M54.5, G89.29  ICD-9-CM: 724.2, 338.29     Chronic insomnia     ICD-10-CM: F51.04  ICD-9-CM: 780.52       Vitals     BP Pulse Temp Resp Height(growth percentile) Weight(growth percentile)    127/84 (BP 1 Location: Left arm, BP Patient Position: Sitting) 80 97.2 °F (36.2 °C) (Oral) 18 5' 5\" (1.651 m) 207 lb (93.9 kg)    SpO2 BMI A Care Transition RN will be following this patient at discharge for 14 days due to a positive COVID-19 diagnosis.     OB Status Smoking Status          99% 34.45 kg/m2 Hysterectomy Never Smoker      Vitals History      BMI and BSA Data     Body Mass Index Body Surface Area    34.45 kg/m 2 2.08 m 2         Preferred Pharmacy       Pharmacy Name Phone    [address] [place] [phone]         Your Updated Medication List          This list is accurate as of 7/6/18 11:46 AM.  Always use your most recent med list.                albuterol 90 mcg/actuation inhaler   Commonly known as:  PROVENTIL HFA, VENTOLIN HFA, PROAIR HFA   Take 2 Puffs by inhalation every four (4) hours as needed for Wheezing or Shortness of Breath. albuterol-ipratropium 2.5 mg-0.5 mg/3 ml Nebu   Commonly known as:  DUO-NEB   3 mL by Nebulization route every four (4) hours as needed. butalbital-acetaminophen-caff -40 mg per capsule   Commonly known as:  FIORICET   Take 1 Cap by mouth every six (6) hours as needed for Headache. cyclobenzaprine 5 mg tablet   Commonly known as:  FLEXERIL   Take 5 mg by mouth.       pravastatin 20 mg tablet   Commonly known as:  PRAVACHOL   TAKE 1 TABLET BY MOUTH EVERY NIGHT       propranolol 10 mg tablet   Commonly known as:  INDERAL       topiramate 50 mg tablet   Commonly known as:  TOPAMAX   Take 1 Tab by mouth two (2) times a day. traZODone 100 mg tablet   Commonly known as:  DESYREL   Take 1 Tab by mouth nightly. ZOLMitriptan 5 mg tablet   Commonly known as:  ZOMIG   TK 1 T PO 1 TIME PRF MIGRAINE HEADACHE. MAY REPEAT DOSE 1 TIME IN 2 H               Prescriptions Sent to Pharmacy        Refills    traZODone (DESYREL) 100 mg tablet 0    Sig: Take 1 Tab by mouth nightly.     Class: Normal    Pharmacy: [facility] Ph #: [phone]    Route: Oral      We Performed the Following     AMB POC URINALYSIS DIP STICK AUTO W/O MICRO [36497 CPT(R)]       Follow-up Instructions     Return if symptoms worsen or fail to improve. Introducing [facility] HEALTH [facility]! Dear Radha Gao: Thank you for requesting a [employer] account. Our records indicate that you already have an active [employer] account. You can access your account anytime at [email or web address]     Did you know that you can access your hospital and ER discharge instructions at any time in [employer]? You can also review all of your test results from your hospital stay or ER visit. Additional Information    If you have questions, please visit the Frequently Asked Questions section of the [employer] website at [email or web address] Remember, [employer] is NOT to be used for urgent needs. For medical emergencies, dial 911. Now available from your iPhone and Android! Please provide this summary of care documentation to your next provider. Your primary care clinician is listed as Socorro Marc. If you have any questions after today's visit, please call [phone].

## 2021-02-11 ENCOUNTER — VIRTUAL VISIT (OUTPATIENT)
Dept: FAMILY MEDICINE CLINIC | Age: 51
End: 2021-02-11
Payer: COMMERCIAL

## 2021-02-11 DIAGNOSIS — I83.93 SPIDER VEINS OF BOTH LOWER EXTREMITIES: Primary | ICD-10-CM

## 2021-02-11 PROCEDURE — 99212 OFFICE O/P EST SF 10 MIN: CPT | Performed by: FAMILY MEDICINE

## 2021-02-11 RX ORDER — LUBIPROSTONE 24 UG/1
CAPSULE, GELATIN COATED ORAL
COMMUNITY
Start: 2021-01-14 | End: 2021-08-05

## 2021-02-11 RX ORDER — DICLOFENAC POTASSIUM 50 MG/1
POWDER, FOR SOLUTION ORAL
COMMUNITY
Start: 2021-01-15 | End: 2021-08-05 | Stop reason: SDUPTHER

## 2021-02-11 RX ORDER — CLOTRIMAZOLE AND BETAMETHASONE DIPROPIONATE 10; .64 MG/G; MG/G
CREAM TOPICAL
COMMUNITY
Start: 2021-02-04 | End: 2021-09-10

## 2021-02-11 RX ORDER — MONTELUKAST SODIUM 4 MG/1
TABLET, CHEWABLE ORAL
COMMUNITY
Start: 2021-01-12 | End: 2021-04-08

## 2021-02-11 NOTE — PROGRESS NOTES
Chief Complaint   Patient presents with    Leg Pain     broken vessels on the back of both thighs. 1. Have you been to the ER, urgent care clinic since your last visit? Hospitalized since your last visit? No    2. Have you seen or consulted any other health care providers outside of the [address] [place] [address] since your last visit? Include any pap smears or colon screening.  No

## 2021-02-11 NOTE — PROGRESS NOTES
Vika Sharif is a 53 y.o. female who was seen by synchronous (real-time) audio-video technology on 2/11/2021. Assessment & Plan:   Diagnoses and all orders for this visit:    1. Spider veins of both lower extremities        With mild rupture  Ice prn  Advised that this is not concerning    Follow-up and Dispositions    · Return if symptoms worsen or fail to improve. Reviewed plan of care. Patient has provided input and agrees with goals. CPT Codes 31373-38627 for Established Patients may apply to this Telehealth Visit      Subjective:   Vika Sharif was seen for Leg Pain (broken vessels on the back of both thighs.)      Patient presents with:  Leg Pain: broken vessels on the back of both thighs. Her symptoms started 2-3 days ago. She felt pain going up her legs and heard a pop. Review of Systems   Cardiovascular: Negative for leg swelling. Skin:        No leg redness or warmth         Objective:     Physical Exam  Constitutional:       General: She is not in acute distress. Appearance: Normal appearance. Skin:     Comments: Quarter sized area of spider veins with bruising on the right inner thigh. Nickel sized similar area on the inner left leg. Neurological:      Mental Status: She is alert and oriented to person, place, and time. Due to this being a TeleHealth evaluation, many elements of the physical examination are unable to be assessed. We discussed the expected course, resolution and complications of the diagnosis(es) in detail. Medication risks, benefits, costs, interactions, and alternatives were discussed as indicated. I advised her to contact the office if her condition worsens, changes or fails to improve as anticipated. She expressed understanding with the diagnosis(es) and plan.          Pursuant to the emergency declaration under the [address] [facility] [address] [facility] waiver authority and the Coronavirus Preparedness and Response Supplemental Appropriations Act, this Virtual  Visit was conducted, with patient's consent, to reduce the patient's risk of exposure to COVID-19 and provide continuity of care for an established patient. Services were provided through a video synchronous discussion virtually to substitute for in-person clinic visit.     Minnie Gates MD

## 2021-02-15 DIAGNOSIS — F51.04 CHRONIC INSOMNIA: ICD-10-CM

## 2021-02-15 RX ORDER — TRAZODONE HYDROCHLORIDE 100 MG/1
TABLET ORAL
Qty: 90 TAB | Refills: 4 | Status: SHIPPED | OUTPATIENT
Start: 2021-02-15 | End: 2021-08-05

## 2021-02-24 ENCOUNTER — OFFICE VISIT (OUTPATIENT)
Dept: FAMILY MEDICINE CLINIC | Age: 51
End: 2021-02-24
Payer: COMMERCIAL

## 2021-02-24 VITALS
OXYGEN SATURATION: 96 % | HEIGHT: 66 IN | DIASTOLIC BLOOD PRESSURE: 61 MMHG | TEMPERATURE: 97 F | RESPIRATION RATE: 20 BRPM | HEART RATE: 85 BPM | WEIGHT: 188 LBS | SYSTOLIC BLOOD PRESSURE: 128 MMHG | BODY MASS INDEX: 30.22 KG/M2

## 2021-02-24 DIAGNOSIS — J45.20 ASTHMA, WELL CONTROLLED, MILD INTERMITTENT: ICD-10-CM

## 2021-02-24 PROCEDURE — 99213 OFFICE O/P EST LOW 20 MIN: CPT | Performed by: FAMILY MEDICINE

## 2021-02-24 NOTE — PROGRESS NOTES
HISTORY OF PRESENT ILLNESS  Jah Thompson is a 53 y.o. female. Jah Thompson is here to follow up on her asthma. She is better. Currently, she is using Pulmicort regularly which is working fine. She has been symptomatic 4 times over the past two weeks and they were relieved by albuterol  Her allergies are a trigger. Review of Systems   Constitutional: Negative for chills and fever. HENT: Negative for congestion. Respiratory: Negative for cough, shortness of breath and wheezing. Visit Vitals  /61   Pulse 85   Temp 97 °F (36.1 °C) (Temporal)   Resp 20   Ht 5' 5.5\" (1.664 m)   Wt 188 lb (85.3 kg)   SpO2 96%   BMI 30.81 kg/m²       Physical Exam  Vitals signs and nursing note reviewed. Constitutional:       General: She is not in acute distress. Appearance: She is well-developed. She is not diaphoretic. Cardiovascular:      Rate and Rhythm: Normal rate and regular rhythm. Heart sounds: Normal heart sounds. No murmur. No friction rub. No gallop. Pulmonary:      Effort: Pulmonary effort is normal. No respiratory distress. Breath sounds: Normal breath sounds. No wheezing or rales. Skin:     General: Skin is warm and dry. Neurological:      Mental Status: She is alert and oriented to person, place, and time. ASSESSMENT and PLAN    ICD-10-CM ICD-9-CM    1. Asthma, well controlled, mild intermittent  J45.20 493.90 budesonide (PULMICORT FLEXHALER) 90 mcg/actuation aepb inhaler        Continue Pulmicort  Refills per orders  She will call if the frequency of her episodes increase    Follow-up and Dispositions    · Return in about 6 months (around 8/24/2021) for asthma. Reviewed plan of care. Patient has provided input and agrees with goals.

## 2021-04-08 ENCOUNTER — VIRTUAL VISIT (OUTPATIENT)
Dept: FAMILY MEDICINE CLINIC | Age: 51
End: 2021-04-08
Payer: COMMERCIAL
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
## 2021-04-08 PROCEDURE — 99213 OFFICE O/P EST LOW 20 MIN: CPT | Performed by: FAMILY MEDICINE

## 2021-04-08 RX ORDER — HYDROXYZINE 50 MG/1
50 TABLET, FILM COATED ORAL
Qty: 30 TAB | Refills: 0 | Status: SHIPPED | OUTPATIENT
Start: 2021-04-08 | End: 2021-08-05

## 2021-04-08 NOTE — PROGRESS NOTES
Chief Complaint   Patient presents with    Bee sting     Stung by a wasp on Monday. Itching bad. 1. Have you been to the ER, urgent care clinic since your last visit? Hospitalized since your last visit? No    2. Have you seen or consulted any other health care providers outside of the [address] [place] [address] since your last visit? Include any pap smears or colon screening.  No

## 2021-04-08 NOTE — PROGRESS NOTES
Nellie Payan is a 53 y.o. female who was seen by synchronous (real-time) audio-video technology on 4/8/2021. Assessment & Plan:   Diagnoses and all orders for this visit:    1. Insect bite, unspecified site, initial encounter  -     hydrOXYzine HCL (ATARAX) 50 mg tablet; Take 1 Tab by mouth three (3) times daily as needed for Itching. Warm compresses QID  Atarax prn  Ice prn itching    Follow-up and Dispositions    · Return if not better in 4 days. Reviewed plan of care. Patient has provided input and agrees with goals. CPT Codes 62871-04312 for Established Patients may apply to this Telehealth Visit      Subjective:   Nellie Payan was seen for Bee sting (Stung by a wasp on Monday. Itching bad.)      Patient presents with:  Bee sting: Stung by a wasp on Monday. Itching bad. This happened 3 days ago and the area is getting larger. No stinger present. She took Benadryl last night which helped. Review of Systems   Respiratory: Negative for shortness of breath. Skin: Negative for rash. No pus or drainage. Objective:     Physical Exam  Constitutional:       General: She is not in acute distress. Appearance: Normal appearance. Skin:     Comments: Entire right buttock red and swollen, central raised, darker red area   Neurological:      Mental Status: She is alert and oriented to person, place, and time. Due to this being a TeleHealth evaluation, many elements of the physical examination are unable to be assessed. We discussed the expected course, resolution and complications of the diagnosis(es) in detail. Medication risks, benefits, costs, interactions, and alternatives were discussed as indicated. I advised her to contact the office if her condition worsens, changes or fails to improve as anticipated. She expressed understanding with the diagnosis(es) and plan.          Pursuant to the emergency declaration under the [facility] waiver authority and the [employer] Act, this Virtual  Visit was conducted, with patient's consent, to reduce the patient's risk of exposure to COVID-19 and provide continuity of care for an established patient. Services were provided through a video synchronous discussion virtually to substitute for in-person clinic visit.     Robert Kim MD

## 2021-05-06 ENCOUNTER — OFFICE VISIT (OUTPATIENT)
Dept: NEUROLOGY | Age: 51
End: 2021-05-06
Payer: COMMERCIAL

## 2021-05-06 VITALS — RESPIRATION RATE: 20 BRPM | DIASTOLIC BLOOD PRESSURE: 86 MMHG | SYSTOLIC BLOOD PRESSURE: 118 MMHG

## 2021-05-06 DIAGNOSIS — G25.0 ESSENTIAL TREMOR: ICD-10-CM

## 2021-05-06 DIAGNOSIS — M54.81 BILATERAL OCCIPITAL NEURALGIA: ICD-10-CM

## 2021-05-06 DIAGNOSIS — G43.719 CHRONIC MIGRAINE WITHOUT AURA, INTRACTABLE, WITHOUT STATUS MIGRAINOSUS: Primary | ICD-10-CM

## 2021-05-06 DIAGNOSIS — M54.2 CERVICALGIA: ICD-10-CM

## 2021-05-06 DIAGNOSIS — G44.229 CHRONIC TENSION-TYPE HEADACHE, NOT INTRACTABLE: ICD-10-CM

## 2021-05-06 PROCEDURE — 99214 OFFICE O/P EST MOD 30 MIN: CPT | Performed by: PSYCHIATRY & NEUROLOGY

## 2021-05-06 RX ORDER — PROPRANOLOL HYDROCHLORIDE 10 MG/1
10 TABLET ORAL 2 TIMES DAILY
Qty: 60 TAB | Refills: 3 | Status: SHIPPED | OUTPATIENT
Start: 2021-05-06

## 2021-05-06 RX ORDER — METHOCARBAMOL 500 MG/1
500 TABLET, FILM COATED ORAL
Qty: 30 TAB | Refills: 3 | Status: SHIPPED | OUTPATIENT
Start: 2021-05-06 | End: 2021-08-05 | Stop reason: SDUPTHER

## 2021-05-06 NOTE — LETTER
5/6/2021    Patient: Juanito Nicole   YOB: 1970   Date of Visit: 5/6/2021     Sheeba Phelps MD  [facility]  [address]  [place] [employer]    Dear Sheeba Phelps MD,      Thank you for referring Ms. Peyton Nicole to [facility] for evaluation. My notes for this consultation are attached. If you have questions, please do not hesitate to call me. I look forward to following your patient along with you.       Sincerely,    Jak Feliciano MD

## 2021-05-06 NOTE — PROGRESS NOTES
NEUROLOGY CLINIC NOTE    Patient ID:  Leni Prater  [id number]  89 y.o.  1970    Date of Visit:  May 6, 2021    Reason for Visit: chronic headaches, neck pain, tremors    Chief Complaint   Patient presents with    Follow-up     headache, neck pain        History of Present Illness:     Patient Active Problem List    Diagnosis Date Noted    Severe obesity (BMI 35.0-39. 9) with comorbidity ([address] 04/16/2018    Chronic insomnia 03/28/2017    Non morbid obesity 03/28/2017    Vitamin D deficiency 10/20/2015    Allergic rhinitis 06/14/2013    Hypercholesteremia 01/23/2012    Allergy 07/14/2011    Mild intermittent asthma without complication 15/62/2037    Migraines 07/14/2011    GERD (gastroesophageal reflux disease) 07/14/2011    Headache(784.0) 07/14/2011    Cerebral vascular malformation 07/14/2011     Past Medical History:   Diagnosis Date    Allergic rhinitis 6/14/2013    Asthma     Chronic insomnia 3/28/2017    GERD (gastroesophageal reflux disease)     Headache     Headache(784.0)     Hypercholesteremia 1/23/2012    Non morbid obesity 3/28/2017    Snoring     Vitamin D deficiency 10/20/2015      Past Surgical History:   Procedure Laterality Date    ENDOSCOPY, COLON, DIAGNOSTIC  12/03 & 04/09    HX GYN      Laproscopy, BTL,TVH    HX GYN      hysterectomy    HX HYSTERECTOMY      HX OTHER SURGICAL  11/22/2019    left knee repair torn meniscus       Prior to Admission medications    Medication Sig Start Date End Date Taking? Authorizing Provider   hydrOXYzine HCL (ATARAX) 50 mg tablet Take 1 Tab by mouth three (3) times daily as needed for Itching. 4/8/21  Yes Kathy Martinez MD   budesonide (PULMICORT FLEXHALER) 90 mcg/actuation aepb inhaler Take 1 Puff by inhalation two (2) times a day.  2/24/21  Yes Kathy Martinez MD   traZODone (DESYREL) 100 mg tablet TAKE 1 TABLET BY MOUTH EVERY NIGHT 2/15/21  Yes Kathy Martinez MD   clotrimazole-betamethasone (LOTRISONE) topical cream 2/4/21  Yes Provider, Historical   Cambia 50 mg pwpk TAKE CONTENTS OF 1 PACKET BY MOUTH EVERY DAY AS NEEDED FOR HEADACHE 1/15/21  Yes Provider, Historical   Amitiza 24 mcg capsule TAKE 1 CAPSULE BY MOUTH TWICE A DAY 1/14/21  Yes Provider, Historical   propranoloL (INDERAL) 10 mg tablet Take 1 Tab by mouth two (2) times a day. 1/14/21  Yes Orin Pedraza MD   benzonatate (TESSALON) 200 mg capsule TAKE 1 CAPSULE BY MOUTH THREE TIMES DAILY AS NEEDED FOR COUGH 1/13/21  Yes Becca Carrasco MD   fexofenadine-pseudoephedrine (ALLEGRA-D 24) 180-240 mg per tablet Take 1 Tab by mouth daily. 1/13/21  Yes Becca Carrasco MD   butalbital-acetaminophen-caffeine (FIORICET, ESGIC) -40 mg per tablet Take 1 Tab by mouth every six (6) hours as needed for Headache. 12/29/20  Yes Orin Pedraza MD   topiramate ER (Trokendi XR) 100 mg capsule Take 1 Cap by mouth daily. 12/17/20  Yes Orin Pedraza MD   topiramate ER (Trokendi XR) 50 mg capsule Take 1 Cap by mouth daily. In addition to 100 mg daily for a total of 150 mg daily. 12/17/20  Yes Orin Pedraza MD   cyclobenzaprine (FLEXERIL) 10 mg tablet Take 1 Tab by mouth nightly. 8/27/20  Yes Orin Pedraza MD   valACYclovir (VALTREX) 500 mg tablet TK 1 T PO  QD 12/23/18  Yes Provider, Historical   omeprazole (PRILOSEC) 40 mg capsule Take 1 Cap by mouth daily.  7/21/18  Yes Laxmi Faulkner MD     Allergies   Allergen Reactions    Latex Rash    Aspirin Other (comments)     \"It doesn't digest in my system\"      Social History     Tobacco Use    Smoking status: Never Smoker    Smokeless tobacco: Never Used   Substance Use Topics    Alcohol use: Yes     Comment: Occasionally      Family History   Problem Relation Age of Onset    Hypertension Father     Kidney Disease Maternal Grandmother     Hypertension Maternal Grandmother     Heart Attack Paternal Grandfather     Hypertension Mother     Cancer Maternal Grandfather         lung Subjective:      Elaine Barba is a 53 y.o. RHAA female with history of chronic migraines, chronic tension headache, tremors and hyperlipidemia who is here for a follow up visit. Patient was a previous patient at [facility] [place] [address] and [facility] neurology. Patient was being seen for headaches. Onset 15 years ago. Severity: moderate to severe. It occurs daily, has more than one hour duration and is unchanged. Location was bitemporal and occipital. There was radiation to the neck and shoulders. The patient describes it as pressure and ache. Timing of headache: upon awakening. Denies aggravating factors. Denies relieving factors. Associated symptoms include phonophobia. Pertinent negatives include blurred vision, family history of migraine, nausea, photophobia, vertigo and vomiting. Per patient condition started since 2002. Previously seen by neurosurgery who did a brain MRI last 3/27/2002 which showed a small venous angioma right occipital lobe. Acute onset of two types of headache. One that is pressure like. 9/10 at the back of her head (R>L) and the other bitemporal achy like that is 5/10 in intensity. It lasts for hours but she does have headache free periods during the day. Associated with slowed thought process and noise sensitivity. Brain MRI done with and without contrast 10/20/2011 which was unremarkable except for the persistent right occipital venous anomaly. Patient was diagnosed with occipital neuralgia, chronic tension-type headache and congenital anomaly of the cerebrovascular system. When patient was seen at [facility] on 5/9/2012 she reported compliance with her medication regimen and she was having significant benefit. She was not having any major headaches and rarely uses the Fioricet. She was taking Propranolol 10 mg BID and Amitriptyline 30 mg at bedtime. Amitriptyline was helping her sleep. She also was evaluated last 10/29/14 and 12/31/2014 for dizziness and vertigo.  She was seen by Dr. Ashlee Rubalcava (ENT) and was cleared. Patient was known to be none compliant with follow up visit when she is well. She was last seen at [facility] 5/28/2015, then she reported improvement of her headaches in the sense that they are less intense but it still occurs everyday. Patient has previous tried Ibuprofen and Naproxen without any relief. The Cambia helps abort it immediately. BUPAP helps relieve it after 15 to 30 minutes and she takes it everyday. Denies any side effects. The headaches however recurs. She is also sleeping better with the Temazepam 30 mg at bedtime. Denies any sedating or cognitive side effects. She tries to correct her posture. She has been doing the neck and shoulder exercises. Plan was to continue her regimen. Since December 2017, patient reports worsening of her headache again. She apparently has been in and out of the hospital [facility] from end of December 2017 to first week of January for respiratory issues with multiple prednisone treatments. The last 1/12/2018 patient was at the [facility] ER for severe headaches and underwent lumbar puncture. CSF studies were unremarkable. Elevated WBC (14.91). Since then, patient has been having abrupt onset when sitting or standing of bitemporal and bilateral occiput pain. Pressure like with neck stiffness. 8/10. Nena Trujillo takes the edge off it. Associated with hot flashes and slight blurring of vision. Resolves with laying down. Patient did go back to the ER at [facility]. She did get a blood patch. It helped improve the positional headache and neck stiffness. Patient underwent a brain MRI with and without contrast 1/29/2018 was normal. No abnormal enhancements. Since her last visit on 12/29/2020, patient reports improvement of her headaches. Less frequent and intense. Severe migraine headaches are better. She continues to take Trokendi XR a total of 150 mg daily. Denies any side effects. Cambia offers relief as abortive therapy.  Fioricet offers relief for her more milder headaches. She also reports that her hand tremors are the same. More intense without the medication. It still does not affect her activities of daily living. She continues to take Propranolol 10 mg BID. Denies any side effects. She continues to have intermittent neck and shoulder pains. She reports issues with Cyclobenzaprine 10 mg at bedtime. It causes her to have weird dreams. She also takes Trazodone for sleep. She reports having side effect from second shot of [place] covid vaccine last Friday. Felt generalized fatigue, muscle aches and neck weakness. Better now. Outside reports reviewed: none    Review of Systems:    A comprehensive review of systems was negative except for those mentioned in the history    Objective:     Visit Vitals  /86   Resp 20     PHYSICAL EXAM:    NEUROLOGICAL EXAM:    Appearance: The patient is obese, provides a coherent history and is in no acute distress. Mental Status: Oriented to time, place and person. Fluent, no aphasia or dysarthria. Mood and affect appropriate. Cranial Nerves:   II - XII were intact. Motor:  5/5 strength. Normal bulk and tone. No fasciculations. Reflexes:   Deep tendon reflexes were symmetrical.   Sensory:   Normal to cold and vibration. Gait:  Normal gait. No Romberg. Tremor:   Mild postural UE hand tremors noted today. No cogwheel rigidity. Cerebellar:  Intact FTN/CASPER/HTS. Anterior head posture (2-3 FB) with shoulders rotated in       Assessment:   Chronic migraine intractable without aura - stable with exacerbations  Chronic tension type headache - stable with exacerbation  Essential tremors  - stable  Cervicalgia - stable  Occipital neuralgia - stable    Plan:   1. Neurology exam is unchanged. It is still non-focal. Previous brain MRI with and without contrast was normal. Continue Trokendi XR total of 150 mg total daily. Continue Cambia for abortive therapy.   Advised to refrain from things that could trigger a headache (dairy, chocolate, wine and etc.). Advised to keep a headache log. 2. Continue Propranolol 10 mg BID and Fioricet for abortive therapy. Prescription was provided. 3. Mild postural UE tremors today. No intentional or resting hand tremors. No tone changes or cogwheel rigidity. No bradykinesia. No evidence at this time to support a diagnosis of Parkinson's disease. Findings consistent with essential tremors. Normal brain MRI. Current regimen of Propranolol and Trokendi are actually medications used to treat tremors and should offer benefit. 4. Neck pain due to poor anterior head posture. Encouraged to continue to correct her anterior head posture. Advised to do the neck and shoulder exercises taught by therapy. Discontinue Cyclobenzaprine due to side effect. Trial of Methocarbamol 500 mg at bedtime. Prescription was provided. 5.  Exam reveals elements of emerging bilateral occipital neuralgia due to poor anterior head posture. Advised to correct her anterior head posture. Use headrest at work, at home and while driving. Do not carry anything on the shoulder. Use wireless headsets. Use only one pillow at most when sleeping. Advised to obtain helmet type head massager. Potential bilateral occipital nerve blocks if condition worsens. Trial of muscle relaxant as above. All questions and concerns were answered. This note was created using voice recognition software. Despite editing, there may be syntax errors.

## 2021-05-06 NOTE — PATIENT INSTRUCTIONS
Neck: Exercises  Introduction  Here are some examples of exercises for you to try. The exercises may be suggested for a condition or for rehabilitation. Start each exercise slowly. Ease off the exercises if you start to have pain. You will be told when to start these exercises and which ones will work best for you. How to do the exercises  Neck stretch   1. This stretch works best if you keep your shoulder down as you lean away from it. To help you remember to do this, start by relaxing your shoulders and lightly holding on to your thighs or your chair. 2. Tilt your head toward your shoulder and hold for 15 to 30 seconds. Let the weight of your head stretch your muscles. 3. If you would like a little added stretch, use your hand to gently and steadily pull your head toward your shoulder. For example, keeping your right shoulder down, lean your head to the left. 4. Repeat 2 to 4 times toward each shoulder. Diagonal neck stretch   1. Turn your head slightly toward the direction you will be stretching, and tilt your head diagonally toward your chest and hold for 15 to 30 seconds. 2. If you would like a little added stretch, use your hand to gently and steadily pull your head forward on the diagonal.  3. Repeat 2 to 4 times toward each side. Dorsal glide stretch   The dorsal glide stretches the back of the neck. If you feel pain, do not glide so far back. Some people find this exercise easier to do while lying on their backs with an ice pack on the neck. 1. Sit or stand tall and look straight ahead. 2. Slowly tuck your chin as you glide your head backward over your body  3. Hold for a count of 6, and then relax for up to 10 seconds. 4. Repeat 8 to 12 times. Chest and shoulder stretch   1. Sit or stand tall and glide your head backward as in the dorsal glide stretch. 2. Raise both arms so that your hands are next to your ears.   3. Take a deep breath, and as you breathe out, lower your elbows down and behind your back. You will feel your shoulder blades slide down and together, and at the same time you will feel a stretch across your chest and the front of your shoulders. 4. Hold for about 6 seconds, and then relax for up to 10 seconds. 5. Repeat 8 to 12 times. Strengthening: Hands on head   1. Move your head backward, forward, and side to side against gentle pressure from your hands, holding each position for about 6 seconds. 2. Repeat 8 to 12 times. Follow-up care is a key part of your treatment and safety. Be sure to make and go to all appointments, and call your doctor if you are having problems. It's also a good idea to know your test results and keep a list of the medicines you take. Where can you learn more? Go to http://www.pen[place].com/  Enter P975 in the search box to learn more about \"Neck: Exercises. \"  Current as of: November 16, 2020               Content Version: 12.8  © 2006-2021 [employer]. Care instructions adapted under license by [employer] (which disclaims liability or warranty for this information). If you have questions about a medical condition or this instruction, always ask your healthcare professional. [place] [address] any warranty or liability for your use of this information. Shoulder Blade: Exercises  Introduction  Here are some examples of exercises for you to try. The exercises may be suggested for a condition or for rehabilitation. Start each exercise slowly. Ease off the exercises if you start to have pain. You will be told when to start these exercises and which ones will work best for you. How to do the exercises  Shoulder roll   1. Stand tall with your chin slightly tucked. Imagine that a string at the top of your head is pulling you straight up. 2. Keep your arms relaxed. All motion will be in your shoulders. 3. Shrug your shoulders up toward your ears, then up and back.  [place] your shoulders down and back, like you're sliding your hands down into your back pants pockets. 4. Repeat the circles at least 2 to 4 times. 5. This exercise is also helpful anytime you want to relax. Lower neck and upper back stretch   1. With your arms about shoulder height, clasp your hands in front of you. 2. Drop your chin toward your chest.  3. Reach straight forward so you are rounding your upper back. Think about pulling your shoulder blades apart. [place]cal[place] feel a stretch across your upper back and shoulders. Hold for at least 6 seconds. 4. Repeat 2 to 4 times. Triceps stretch   1. Reach your arm straight up. 2. Keeping your elbow in place, bend your arm and reach your hand down behind your back. 3. With your other hand, apply gentle pressure to the bent elbow. Peg Metcalf feel a stretch at the back of your upper arm and shoulder. Hold about 6 seconds. 4. Repeat 2 to 4 times with each arm. Shoulder stretch   1. Relax your shoulders. 2. Raise one arm to shoulder height, and reach it across your chest.  3. Pull the arm slightly toward you with your other arm. This will help you get a gentle stretch. Hold for about 6 seconds. 4. Repeat 2 to 4 times. Shoulder blade squeeze   1. Sit or stand up tall with your arms at your sides. 2. Keep your shoulders relaxed and down, not shrugged. 3. Squeeze your shoulder blades together. Hold for 6 seconds, then relax. 4. Repeat 8 to 12 times. Straight-arm shoulder blade squeeze   1. Sit or stand tall. Relax your shoulders. 2. With palms down, hold your elastic tubing or band straight out in front of you. 3. Start with slight tension in the tubing or band, with your hands about shoulder-width apart. 4. Slowly pull straight out to the sides, squeezing your shoulder blades together. Keep your arms straight and at shoulder height. Slowly release. 5. Repeat 8 to 12 times. Rowing   1. [place] your elastic tubing or band at about waist height.  Take one end in each hand. 2. Sit or stand with your feet hip-width apart. 3. Hold your arms straight in front of you. Adjust your distance to create slight tension in the tubing or band. 4. Slightly tuck your chin. Relax your shoulders. 5. Without shrugging your shoulders, pull straight back. Your elbows will pass alongside your waist.    Pull-downs   1. [place] your elastic tubing or band in the top of a closed door. Take one end in each hand. 2. Either sit or stand, depending on what is more comfortable. If you feel unsteady, sit on a chair. 3. Start with your arms up and comfortably apart, elbows straight. There should be a slight tension in the tubing or band. 4. Slightly tuck your chin, and look straight ahead. 5. Keeping your back straight, slowly pull down and back, bending your elbows. 6. Stop where your hands are level with your chin, in a \"goalpost\" position. 7. Repeat 8 to 12 times. Chest T stretch   1. Lie on your back. Raise your knees so they are bent. Plant your feet on the floor, hip-width apart. 2. Tuck your chin, and relax your shoulders. 3. Reach your arms straight out to the sides. If you don't feel a mild stretch in your shoulders and across your chest, use a foam roll or a tightly rolled blanket under your spine, from your tailbone to your head. 4. Relax in this position for at least 15 to 30 seconds while you breathe normally. Repeat 2 to 4 times. 5. As you get used to this stretch, keep adding a little more time until you are able relax in this position for 2 or 3 minutes. When you can relax for at least 2 minutes, you only need to do the exercise 1 time per session. Chest goalpost stretch   1. Lie on your back. Raise your knees so they are bent. Plant your feet on the floor, hip-width apart. 2. Tuck your chin, and relax your shoulders. 3. Reach your arms straight out to the sides. 4. Bend your arms at the elbows, with your hands pointed toward the top of your head.  Your arms should make an L on either side of your head. Your palms should be facing up. 5. If you don't feel a mild stretch in your shoulders and across your chest, use a foam roll or tightly rolled blanket under your spine, from your tailbone to your head. 6. Relax in this position for at least 15 to 30 seconds while you breathe normally. Repeat 2 to 4 times. 7. Each day you do this exercise, add a little more time until you can relax in this position for 2 or 3 minutes. When you can relax for at least 2 minutes, you only need to do the exercise 1 time per session. Follow-up care is a key part of your treatment and safety. Be sure to make and go to all appointments, and call your doctor if you are having problems. It's also a good idea to know your test results and keep a list of the medicines you take. Where can you learn more? Go to http://www.gray.com/  Enter H745 in the search box to learn more about \"Shoulder Blade: Exercises. \"  Current as of: November 16, 2020               Content Version: 12.8  © 2006-2021 [employer]. Care instructions adapted under license by [employer] (which disclaims liability or warranty for this information). If you have questions about a medical condition or this instruction, always ask your healthcare professional. [address] any warranty or liability for your use of this information.

## 2021-05-06 NOTE — PROGRESS NOTES
Last Friday after she got the second COVID injection- she felt like her neck was weak, didn't feel the same, her body was fatigued     Headaches- prior to the injection they had been okay     The first sign if stress is a big trigger for her, it comes on so fast     The muscle relaxers make her have really weird dreams like as if she is sleep but in an awake state

## 2021-06-24 DIAGNOSIS — G43.719 CHRONIC MIGRAINE WITHOUT AURA, INTRACTABLE, WITHOUT STATUS MIGRAINOSUS: Primary | ICD-10-CM

## 2021-06-24 NOTE — TELEPHONE ENCOUNTER
----- Message from [facility] sent at 6/24/2021 11:34 AM EDT -----  Regarding: /Telephone     Caller's first and last name:Pt  Reason for call:med conflict due to work  Callback required yes/no and why:Yes  Best contact number(s):[phone]  Details to clarify the request: Pt was called back to work for [employer] and her employer is saying since she is taking fiorcet she will not be able to drive  because it is not federally approved by the driving guidelines and would like to know if either she could be prescribed something else or get a letter stating that this would not affect her job duties.

## 2021-06-25 ENCOUNTER — TELEPHONE (OUTPATIENT)
Dept: NEUROLOGY | Age: 51
End: 2021-06-25

## 2021-06-25 RX ORDER — RIMEGEPANT SULFATE 75 MG/75MG
TABLET, ORALLY DISINTEGRATING ORAL
Qty: 8 TABLET | Refills: 3 | Status: SHIPPED | OUTPATIENT
Start: 2021-06-25 | End: 2021-08-05 | Stop reason: SDUPTHER

## 2021-08-05 ENCOUNTER — TELEPHONE (OUTPATIENT)
Dept: NEUROLOGY | Age: 51
End: 2021-08-05

## 2021-08-05 ENCOUNTER — OFFICE VISIT (OUTPATIENT)
Dept: NEUROLOGY | Age: 51
End: 2021-08-05
Payer: COMMERCIAL

## 2021-08-05 VITALS
OXYGEN SATURATION: 99 % | RESPIRATION RATE: 18 BRPM | HEART RATE: 63 BPM | BODY MASS INDEX: 29.47 KG/M2 | WEIGHT: 183.4 LBS | DIASTOLIC BLOOD PRESSURE: 80 MMHG | HEIGHT: 66 IN | SYSTOLIC BLOOD PRESSURE: 115 MMHG

## 2021-08-05 DIAGNOSIS — M54.81 BILATERAL OCCIPITAL NEURALGIA: ICD-10-CM

## 2021-08-05 DIAGNOSIS — M54.2 CERVICALGIA: ICD-10-CM

## 2021-08-05 DIAGNOSIS — G43.719 CHRONIC MIGRAINE WITHOUT AURA, INTRACTABLE, WITHOUT STATUS MIGRAINOSUS: Primary | ICD-10-CM

## 2021-08-05 DIAGNOSIS — Q28.3 CONGENITAL ANOMALY OF CEREBROVASCULAR SYSTEM: ICD-10-CM

## 2021-08-05 DIAGNOSIS — G44.229 CHRONIC TENSION-TYPE HEADACHE, NOT INTRACTABLE: ICD-10-CM

## 2021-08-05 DIAGNOSIS — G25.0 ESSENTIAL TREMOR: ICD-10-CM

## 2021-08-05 PROCEDURE — 99214 OFFICE O/P EST MOD 30 MIN: CPT | Performed by: PSYCHIATRY & NEUROLOGY

## 2021-08-05 RX ORDER — RIMEGEPANT SULFATE 75 MG/75MG
TABLET, ORALLY DISINTEGRATING ORAL
Qty: 2 TABLET | Refills: 0 | Status: SHIPPED | COMMUNITY
Start: 2021-08-05 | End: 2021-09-10

## 2021-08-05 RX ORDER — DICLOFENAC POTASSIUM 50 MG/1
POWDER, FOR SOLUTION ORAL
Qty: 9 PACKET | Refills: 3 | Status: SHIPPED | OUTPATIENT
Start: 2021-08-05 | End: 2021-11-10 | Stop reason: SDUPTHER

## 2021-08-05 RX ORDER — RIMEGEPANT SULFATE 75 MG/75MG
TABLET, ORALLY DISINTEGRATING ORAL
Qty: 8 TABLET | Refills: 3 | Status: SHIPPED | OUTPATIENT
Start: 2021-08-05 | End: 2021-09-10

## 2021-08-05 RX ORDER — DICLOFENAC POTASSIUM 50 MG/1
1 POWDER, FOR SOLUTION ORAL
Qty: 5 PACKET | Refills: 0 | Status: SHIPPED | COMMUNITY
Start: 2021-08-05 | End: 2021-10-01

## 2021-08-05 RX ORDER — METHOCARBAMOL 500 MG/1
1000 TABLET, FILM COATED ORAL
Qty: 60 TABLET | Refills: 3 | Status: SHIPPED | OUTPATIENT
Start: 2021-08-05 | End: 2021-10-01

## 2021-08-05 NOTE — PROGRESS NOTES
Per Dr. Rohan Gunn, sample meds were given by MD. LPN clarified with pt that sample meds were received. 50mg  of Cambia was administered oral. NDC: 02788, Lot: [id number], Exp: 11/01/2022 Manf: [address] [place] [address].    75mg of Nurtec ODT was administered oral. NDC: [address]-4, Lot: [id number], Exp: 10/01/2022 Manf: Taiwo De La Torre. Strong peripheral pulses

## 2021-08-05 NOTE — LETTER
8/5/2021    Patient: Devora Nicole   YOB: 1970   Date of Visit: 8/5/2021     Erin Raman MD  [address] [place] [address] In [facility] [address]    Dear Erin Raman MD,      Thank you for referring Ms. Peyton Nicole to [facility] for evaluation. My notes for this consultation are attached. If you have questions, please do not hesitate to call me. I look forward to following your patient along with you.       Sincerely,    Noemi Barnes MD

## 2021-08-05 NOTE — PROGRESS NOTES
RM 1    Identified pt with two pt identifiers(name and ). Reviewed record in preparation for visit and have obtained necessary documentation. Chief Complaint   Patient presents with    Follow-up     3 month f/u    Headache     pt states, headaches been occuring more.  Neck Pain     sharp pains radiating down right neck,right back, right leg. pt states, sometimes it be stiff.  Medication Evaluation     pt states, a med adjustment was supposed to be made. Pt states, never received nurtec rx    Letter for School/Work     requesting a letter for work informing about med change. Vitals:    21 1451   BP: 115/80   Pulse: 63   Resp: 18   SpO2: 99%   Weight: 83.2 kg (183 lb 6.4 oz)   Height: 5' 5.5\" (1.664 m)   PainSc:   6   PainLoc: Generalized       Health Maintenance Review: Patient reminded of \"due or due soon\" health maintenance. I have asked the patient to contact his/her primary care provider (PCP) for follow-up on his/her health maintenance. Coordination of Care Questionnaire:  :   1) Have you been to an emergency room, urgent care, or hospitalized since your last visit? If yes, where when, and reason for visit? no       2. Have seen or consulted any other health care provider since your last visit? If yes, where when, and reason for visit? NO    Patient is accompanied by self I have received verbal consent from [address] [place] [address] to discuss any/all medical information while they are present in the room.

## 2021-08-05 NOTE — TELEPHONE ENCOUNTER
Kaylyn Patterson with [address] called to speak with Dr. Candelaria Coto nurse about PA for [facility].     Please call her back at:  [phone]

## 2021-08-05 NOTE — LETTER
8/5/2021 3:26 PM    Ms. Mell Nicole  [address]   [place] [address]      To Whom It May Concern:    Sherman Crawley is currently under the care of [facility]. Patient's Fioricet has been discontinued and changed to another medication that is not sedating. If there are questions or concerns please do not hesitate to contact my office.         Sincerely,      Fabby Bishop MD

## 2021-08-05 NOTE — TELEPHONE ENCOUNTER
RE OCCIPITAL NERVE BLOCK PROCEDURE CPT 48928 CANNOT BE APPROVED AND IS CONSIDERED INVESTIGATIONAL AND EXPERIMENTA TREATMENT BY ALL PLANS WITH THE EXCEPTION OF MEDICARE (RED WHITE AND BLUE CARD).

## 2021-08-05 NOTE — PROGRESS NOTES
NEUROLOGY CLINIC NOTE    Patient ID:  Griselda Caruso  [id number]  62 y.o.  1970    Date of Visit:  August 5, 2021    Reason for Visit: chronic headaches, neck pain, tremors    Chief Complaint   Patient presents with    Follow-up     3 month f/u    Headache     pt states, headaches been occuring more.  Neck Pain     sharp pains radiating down right neck,right back, right leg. pt states, sometimes it be stiff.  Medication Evaluation     pt states, a med adjustment was supposed to be made. unsure of name.  Letter for School/Work     requesting a letter for work informing about med change. History of Present Illness:     Patient Active Problem List    Diagnosis Date Noted    Severe obesity (BMI 35.0-39. 9) with comorbidity ([facility] [address] 04/16/2018    Chronic insomnia 03/28/2017    Non morbid obesity 03/28/2017    Vitamin D deficiency 10/20/2015    Allergic rhinitis 06/14/2013    Hypercholesteremia 01/23/2012    Allergy 07/14/2011    Mild intermittent asthma without complication 31/38/7186    Migraines 07/14/2011    GERD (gastroesophageal reflux disease) 07/14/2011    Headache(784.0) 07/14/2011    Cerebral vascular malformation 07/14/2011     Past Medical History:   Diagnosis Date    Allergic rhinitis 6/14/2013    Asthma     Chronic insomnia 3/28/2017    GERD (gastroesophageal reflux disease)     Headache     Headache(784.0)     Hypercholesteremia 1/23/2012    Non morbid obesity 3/28/2017    Snoring     Vitamin D deficiency 10/20/2015      Past Surgical History:   Procedure Laterality Date    ENDOSCOPY, COLON, DIAGNOSTIC  12/03 & 04/09    HX GYN      Laproscopy, BTL,TVH    HX GYN      hysterectomy    HX HYSTERECTOMY      HX OTHER SURGICAL  11/22/2019    left knee repair torn meniscus       Prior to Admission medications    Medication Sig Start Date End Date Taking? Authorizing Provider   topiramate ER (Trokendi XR) 50 mg capsule Take 1 Capsule by mouth daily.  In addition to 100 mg daily for a total of 150 mg daily. 6/25/21  Yes Kevin Marley MD   topiramate ER (Trokendi XR) 100 mg capsule Take 1 Capsule by mouth daily. 6/25/21  Yes Kevin Marley MD   propranoloL (INDERAL) 10 mg tablet Take 1 Tab by mouth two (2) times a day. 5/6/21  Yes Kevin Marley MD   methocarbamoL (ROBAXIN) 500 mg tablet Take 1 Tab by mouth nightly. 5/6/21  Yes Kevin Marley MD   budesonide (PULMICORT FLEXHALER) 90 mcg/actuation aepb inhaler Take 1 Puff by inhalation two (2) times a day. 2/24/21  Yes Eric Correia MD   clotrimazole-betamethasone (LOTRISONE) topical cream  2/4/21  Yes Provider, Historical   Cambia 50 mg pwpk TAKE CONTENTS OF 1 PACKET BY MOUTH EVERY DAY AS NEEDED FOR HEADACHE 1/15/21  Yes Provider, Historical   benzonatate (TESSALON) 200 mg capsule TAKE 1 CAPSULE BY MOUTH THREE TIMES DAILY AS NEEDED FOR COUGH 1/13/21  Yes Eric Correia MD   fexofenadine-pseudoephedrine (ALLEGRA-D 24) 180-240 mg per tablet Take 1 Tab by mouth daily. 1/13/21  Yes Eric Correia MD   valACYclovir (VALTREX) 500 mg tablet Take 500 mg by mouth as needed. 12/23/18  Yes Provider, Historical   omeprazole (PRILOSEC) 40 mg capsule Take 1 Cap by mouth daily. 7/21/18  Yes Seth Mustafa MD   rimegepant (Nurtec ODT) 75 mg disintegrating tablet Take one tablet at headache onset. Max dose 1 tab in 24 hours  Patient not taking: Reported on 8/5/2021 6/25/21   Kevin Marley MD   hydrOXYzine HCL (ATARAX) 50 mg tablet Take 1 Tab by mouth three (3) times daily as needed for Itching.   Patient not taking: Reported on 8/5/2021 4/8/21   Eric Correia MD   traZODone (DESYREL) 100 mg tablet TAKE 1 TABLET BY MOUTH EVERY NIGHT  Patient not taking: Reported on 8/5/2021 2/15/21   Eric Correia MD   Amitiza 24 mcg capsule TAKE 1 CAPSULE BY MOUTH TWICE A DAY  Patient not taking: Reported on 8/5/2021 1/14/21   Provider, Historical     Allergies   Allergen Reactions    Latex Rash    Aspirin Other (comments)     \"It doesn't digest in my system\"      Social History     Tobacco Use    Smoking status: Never Smoker    Smokeless tobacco: Never Used   Substance Use Topics    Alcohol use: Yes     Comment: Occasionally      Family History   Problem Relation Age of Onset    Hypertension Father     Kidney Disease Maternal Grandmother     Hypertension Maternal Grandmother     Heart Attack Paternal Grandfather     Hypertension Mother     Cancer Maternal Grandfather         lung        Subjective:      Mare Carrasco is a 53 y.o. RHAA female with history of chronic migraines, chronic tension headache, tremors, right occiput venous angioma and hyperlipidemia who is here for a follow up visit. Patient was a previous patient at [facility] [place] [address] and [facility] neurology. Patient was being seen for headaches. Onset 15 years ago. Severity: moderate to severe. It occurs daily, has more than one hour duration and is unchanged. Location was bitemporal and occipital. There was radiation to the neck and shoulders. The patient describes it as pressure and ache. Timing of headache: upon awakening. Denies aggravating factors. Denies relieving factors. Associated symptoms include phonophobia. Pertinent negatives include blurred vision, family history of migraine, nausea, photophobia, vertigo and vomiting. Per patient condition started since 2002. Previously seen by neurosurgery who did a brain MRI last 3/27/2002 which showed a small venous angioma right occipital lobe. Acute onset of two types of headache. One that is pressure like. 9/10 at the back of her head (R>L) and the other bitemporal achy like that is 5/10 in intensity. It lasts for hours but she does have headache free periods during the day. Associated with slowed thought process and noise sensitivity.    Brain MRI done with and without contrast 10/20/2011 which was unremarkable except for the persistent right occipital venous anomaly. Patient was diagnosed with occipital neuralgia, chronic tension-type headache and congenital anomaly of the cerebrovascular system. When patient was seen at [facility] on 5/9/2012 she reported compliance with her medication regimen and she was having significant benefit. She was not having any major headaches and rarely uses the Fioricet. She was taking Propranolol 10 mg BID and Amitriptyline 30 mg at bedtime. Amitriptyline was helping her sleep. She also was evaluated last 10/29/14 and 12/31/2014 for dizziness and vertigo. She was seen by Dr. Kirstin Post (ENT) and was cleared. Patient was known to be none compliant with follow up visit when she is well. She was last seen at [facility] 5/28/2015, then she reported improvement of her headaches in the sense that they are less intense but it still occurs everyday. Patient has previous tried Ibuprofen and Naproxen without any relief. The Cambia helps abort it immediately. BUPAP helps relieve it after 15 to 30 minutes and she takes it everyday. Denies any side effects. The headaches however recurs. She is also sleeping better with the Temazepam 30 mg at bedtime. Denies any sedating or cognitive side effects. She tries to correct her posture. She has been doing the neck and shoulder exercises. Plan was to continue her regimen. Since December 2017, patient reports worsening of her headache again. She apparently has been in and out of the hospital [facility]) from end of December 2017 to first week of January for respiratory issues with multiple prednisone treatments. The last 1/12/2018 patient was at the [address] [place] [address] ER for severe headaches and underwent lumbar puncture. CSF studies were unremarkable. Elevated WBC (14.91). Since then, patient has been having abrupt onset when sitting or standing of bitemporal and bilateral occiput pain. Pressure like with neck stiffness. 8/10. Lisa Morganor takes the edge off it. Associated with hot flashes and slight blurring of vision.  Resolves with laying down. Patient did go back to the ER at [facility]. She did get a blood patch. It helped improve the positional headache and neck stiffness. Patient underwent a brain MRI with and without contrast 1/29/2018 was normal. No abnormal enhancements. Since her last visit on 5/6/2021, patient reports worsening of her headache. They are more frequent. She had to stop fioricet as abortive therapy due to her work. It is not allowed as a . She was prescribed Nurtec but has not been able to obtain it yet. Not been able to obtain cambia as well. Options for abortive therapy is limited as patient cannot take triptan due to right occipital venous anomaly. She continues to take Trokendi XR a total of 150 mg daily. Denies any side effects. She continues to take Propranolol 10 mg BID. Denies any side effects. She continues to have intermittent neck and shoulder pains. She reports Methocarbamol 500 mg at bedtime if not offering same benefit as Cyclobenzaprine. No side effects. Medications tried for headache:  Cambia, Fioricet, temazepam, propranolol, Topamax, Trokendi, amitriptyline, cyclobenzaprine, methocarbamol, Phrenilin, ibuprofen, Tylenol, Excedrin, Aleve    Outside reports reviewed: none    Review of Systems:    A comprehensive review of systems was negative except for those mentioned in the history    Objective:     Visit Vitals  /80 (BP 1 Location: Left upper arm, BP Patient Position: Sitting, BP Cuff Size: Adult)   Pulse 63   Resp 18   Ht 5' 5.5\" (1.664 m)   Wt 83.2 kg (183 lb 6.4 oz)   SpO2 99%   BMI 30.06 kg/m²     PHYSICAL EXAM:    NEUROLOGICAL EXAM:    Appearance: The patient is obese, provides a coherent history and is in no acute distress. Mental Status: Oriented to time, place and person. Fluent, no aphasia or dysarthria. Mood and affect appropriate. Cranial Nerves:   II - XII were intact. Motor:  5/5 strength. Normal bulk and tone. No fasciculations.    Reflexes:   Deep tendon reflexes were symmetrical.   Sensory:   Normal to cold and vibration. Gait:  Normal gait. No Romberg. Tremor:   Mild postural UE hand tremors noted today. No cogwheel rigidity. Cerebellar:  Intact FTN/CASPER/HTS. Anterior head posture (2-3 FB) with shoulders rotated in       Assessment:   Chronic migraine intractable without aura - worsening  Chronic tension type headache -   Essential tremors    Cervicalgia  Occipital neuralgia   Venous anomaly    Plan:   1. Neurology exam is unchanged. It is still non-focal. Previous brain MRI with and without contrast was normal except for right occipital venous angioma. Continue Trokendi XR total of 150 mg total daily. Continue Cambia for abortive therapy. Sample and prescription was provided. Trial of Nurtec 75 mg ODT for abortive therapy. Sample and prescription was provided. Advised to refrain from things that could trigger a headache (dairy, chocolate, wine and etc.). Advised to keep a headache log. Not a candidate for triptan's due to vascular malformation on brain MRI. Letter for work was provided and patient is off [phone] [facility]. 2. Continue Propranolol 10 mg BID. 3. Mild postural UE tremors today. No intentional or resting hand tremors. No tone changes or cogwheel rigidity. No bradykinesia. No evidence at this time to support a diagnosis of Parkinson's disease. Findings consistent with essential tremors. Unremarkable brain MRI. Current regimen of Propranolol and Trokendi are actually medications used to treat tremors and should offer benefit. 4. Neck pain due to poor anterior head posture. Encouraged to continue to correct her anterior head posture. Advised to do the neck and shoulder exercises taught by therapy. Discontinue Cyclobenzaprine due to side effect. Increase Methocarbamol to 1000 mg at bedtime. Prescription was provided. 5.  Exam reveals elements of emerging bilateral occipital neuralgia due to poor anterior head posture.   Advised to correct her anterior head posture. Use headrest at work, at home and while driving. Do not carry anything on the shoulder. Use wireless headsets. Use only one pillow at most when sleeping. Advised to obtain helmet type head massager. Obtain authorization for bilateral occipital nerve blocks. Trial of muscle relaxant as above. 6. Previous brain MRI showing right occipital venous angioma. Monitor for now. Not a triptan candidate. All questions and concerns were answered. This note was created using voice recognition software. Despite editing, there may be syntax errors.

## 2021-09-10 ENCOUNTER — VIRTUAL VISIT (OUTPATIENT)
Dept: FAMILY MEDICINE CLINIC | Age: 51
End: 2021-09-10
Payer: COMMERCIAL

## 2021-09-10 DIAGNOSIS — R05.9 COUGH: ICD-10-CM

## 2021-09-10 DIAGNOSIS — J45.901 EXACERBATION OF ASTHMA, UNSPECIFIED ASTHMA SEVERITY, UNSPECIFIED WHETHER PERSISTENT: ICD-10-CM

## 2021-09-10 DIAGNOSIS — J45.20 MILD INTERMITTENT ASTHMA WITHOUT COMPLICATION: ICD-10-CM

## 2021-09-10 DIAGNOSIS — U07.1 COVID-19: Primary | ICD-10-CM

## 2021-09-10 PROCEDURE — 99214 OFFICE O/P EST MOD 30 MIN: CPT | Performed by: FAMILY MEDICINE

## 2021-09-10 RX ORDER — PROMETHAZINE HYDROCHLORIDE AND CODEINE PHOSPHATE 6.25; 1 MG/5ML; MG/5ML
5 SOLUTION ORAL
Qty: 100 ML | Refills: 0 | Status: SHIPPED | OUTPATIENT
Start: 2021-09-10 | End: 2021-09-15

## 2021-09-10 NOTE — PROGRESS NOTES
Renetta Ward is a 53 y.o. female who was seen by synchronous (real-time) audio-video technology on 9/10/2021. Assessment & Plan:   Diagnoses and all orders for this visit:    1. COVID-19    2. Cough  -     promethazine-codeine (PHENERGAN with CODEINE) 6.25-10 mg/5 mL syrup; Take 5 mL by mouth every six (6) hours as needed for Cough for up to 5 days. Max Daily Amount: 20 mL. 3. Mild intermittent asthma without complication    4. Exacerbation of asthma, unspecified asthma severity, unspecified whether persistent        Cough and asthma exacerbation due to COVID-19   Reassured that palpitations with albuterol can be normal and are not concerning  Use Pulmicort regularly  Rest  Tylenol or Advil prn  Phenergan with Codeine prn  Isolation instructions given  To ER if worse      Follow-up and Dispositions    · Return if symptoms worsen or fail to improve. Reviewed plan of care. Patient has provided input and agrees with goals. CPT Codes 06565-07301 for Established Patients may apply to this Telehealth Visit      Subjective:   Renetta Ward was seen for Cough (flu symptoms ), Headache (with body aches ), and Follow-up (asthma )      Patient presents with:  Cough: flu symptoms   Headache: with body aches   Follow-up: asthma     Her symptoms started 4 days ago and are getting worse. She tested negative for COVID-19 2 days ago but tested positive. She has been vaccinated. She has asthma, which has gotten worse. Evidently, she used her albuterol but stopped it due to palpitations. She is not sure if it helped. Not using her Pulmicort regularly. Review of Systems   Constitutional: Positive for chills and malaise/fatigue. Negative for fever. HENT: Negative for congestion, ear pain and sore throat. No loss of taste or smell   Respiratory: Positive for cough, sputum production and shortness of breath. Negative for wheezing.          Occasional white sputum Cardiovascular: Negative for chest pain. Chest tightness   Gastrointestinal: Negative for abdominal pain, diarrhea, nausea and vomiting. Neurological: Positive for headaches. Objective:     Physical Exam  Constitutional:       General: She is not in acute distress. Appearance: Normal appearance. She is ill-appearing. Pulmonary:      Effort: Pulmonary effort is normal.   Neurological:      Mental Status: She is alert and oriented to person, place, and time. Due to this being a TeleHealth evaluation, many elements of the physical examination are unable to be assessed. We discussed the expected course, resolution and complications of the diagnosis(es) in detail. Medication risks, benefits, costs, interactions, and alternatives were discussed as indicated. I advised her to contact the office if her condition worsens, changes or fails to improve as anticipated. She expressed understanding with the diagnosis(es) and plan. Pursuant to the emergency declaration under the [facility], [facility] waiver authority and the [employer] General Act, this Virtual  Visit was conducted, with patient's consent, to reduce the patient's risk of exposure to COVID-19 and provide continuity of care for an established patient. Services were provided through a video synchronous discussion virtually to substitute for in-person clinic visit.     Georgette Arnold MD

## 2021-09-10 NOTE — PROGRESS NOTES
Mónica Henry is a 53 y.o. female    Chief Complaint   Patient presents with    Cough     flu symptoms     Headache     with body aches     Follow-up     asthma        Health Maintenance Due   Topic Date Due    Colorectal Cancer Screening Combo  10/09/2016    Flu Vaccine (1) Never done       There were no vitals taken for this visit. 3 most recent PHQ Screens 8/5/2021   Little interest or pleasure in doing things Not at all   Feeling down, depressed, irritable, or hopeless Not at all   Total Score PHQ 2 0       Fall Risk Assessment, last 12 mths 2/24/2021   Able to walk? Yes   Fall in past 12 months? 0   Do you feel unsteady? 0   Are you worried about falling 0       Abuse Screening Questionnaire 9/10/2021   Do you ever feel afraid of your partner? N   Are you in a relationship with someone who physically or mentally threatens you? N   Is it safe for you to go home? Y         1. Have you been to the ER, urgent care clinic since your last visit? Hospitalized since your last visit? yes tested positive for Covid today     2. Have you seen or consulted any other health care providers outside of the [address] [place] [address] since your last visit? Include any pap smears or colon screening. yes OBGYN

## 2021-09-16 DIAGNOSIS — J45.20 ASTHMA, WELL CONTROLLED, MILD INTERMITTENT: ICD-10-CM

## 2021-09-16 RX ORDER — BUDESONIDE 90 UG/1
AEROSOL, POWDER RESPIRATORY (INHALATION)
Qty: 3 EACH | Refills: 3 | Status: SHIPPED | OUTPATIENT
Start: 2021-09-16 | End: 2021-10-01

## 2021-09-16 RX ORDER — FEXOFENADINE HYDROCHLORIDE 180 MG/1
TABLET, FILM COATED ORAL
Qty: 90 TABLET | Refills: 3 | Status: SHIPPED | OUTPATIENT
Start: 2021-09-16

## 2021-09-28 ENCOUNTER — TELEPHONE (OUTPATIENT)
Dept: FAMILY MEDICINE CLINIC | Age: 51
End: 2021-09-28

## 2021-09-28 NOTE — TELEPHONE ENCOUNTER
Pt called stating she was diagnosed with Covid after her last appointment with Dr. Souleymane Mandujano, was treated at [facility] ER, returned to work after 14 days, but can't stop coughing, is audibly short of breath, coughing as she's talking, and states nothing is working, including cough medication given and her asthma medication. Pt driving bus, but agrees to go to ER now after scheduling virtual follow up with Dr. Souleymane Mandujano for Friday. Pt urged not to wait until shift over, and go now to ER.  Letty

## 2021-10-01 ENCOUNTER — VIRTUAL VISIT (OUTPATIENT)
Dept: FAMILY MEDICINE CLINIC | Age: 51
End: 2021-10-01
Payer: COMMERCIAL

## 2021-10-01 DIAGNOSIS — R05.9 COUGH: ICD-10-CM

## 2021-10-01 DIAGNOSIS — J45.41 MODERATE PERSISTENT ASTHMA WITH EXACERBATION: Primary | ICD-10-CM

## 2021-10-01 PROCEDURE — 99214 OFFICE O/P EST MOD 30 MIN: CPT | Performed by: FAMILY MEDICINE

## 2021-10-01 RX ORDER — BUDESONIDE 90 UG/1
2 AEROSOL, POWDER RESPIRATORY (INHALATION) 2 TIMES DAILY
Qty: 2 EACH | Refills: 1 | Status: SHIPPED | OUTPATIENT
Start: 2021-10-01 | End: 2021-11-10

## 2021-10-01 RX ORDER — BENZONATATE 200 MG/1
200 CAPSULE ORAL
Qty: 30 CAPSULE | Refills: 0 | Status: SHIPPED | OUTPATIENT
Start: 2021-10-01

## 2021-10-01 RX ORDER — PREDNISONE 20 MG/1
20 TABLET ORAL 3 TIMES DAILY
Qty: 15 TABLET | Refills: 0 | Status: SHIPPED | OUTPATIENT
Start: 2021-10-01 | End: 2021-10-06

## 2021-10-01 NOTE — PROGRESS NOTES
Robbi Brand is a 53 y.o. female who was seen by synchronous (real-time) audio-video technology on 10/1/2021. Assessment & Plan:   Diagnoses and all orders for this visit:    1. Moderate persistent asthma with exacerbation  -     budesonide (Pulmicort Flexhaler) 90 mcg/actuation aepb inhaler; Take 2 Puffs by inhalation two (2) times a day. 2. Cough  -     benzonatate (TESSALON) 200 mg capsule; Take 1 Capsule by mouth three (3) times daily as needed for Cough. -     predniSONE (DELTASONE) 20 mg tablet; Take 20 mg by mouth three (3) times daily for 5 days. Worsening asthma with persistent cough  Increase Pulmicort  Prednisone  Scheduled albuterol x 3 days, then prn  Tessalon prn    Follow-up and Dispositions    · Return in 2 weeks (on 10/15/2021) for asthma. Reviewed plan of care. Patient has provided input and agrees with goals. CPT Codes 27678-91401 for Established Patients may apply to this Telehealth Visit      Subjective:   Robbi Brand was seen for Positive For Covid-19 and Hospital Follow Up      She is here to follow up on her COVID-19. Her test was positive 9/10/21 and she is still symptomatic. Current symptoms are a cough and \"my asthma is crazy\". Talking for a long period of time causes coughing. She has been using her Pulmicort regularly and using her albuterol daily. Currently, she is back at work. Review of Systems   HENT: Negative for congestion and sore throat. No loss of taste or smell   Respiratory: Positive for cough and shortness of breath. Negative for sputum production and wheezing. Chest tightness   Cardiovascular: Negative for chest pain and PND. Gastrointestinal: Negative for abdominal pain, diarrhea, nausea and vomiting. Musculoskeletal: Negative for myalgias. Neurological: Negative for headaches. Objective:     Physical Exam  Vitals reviewed: constant nonproductive cough. Constitutional:       General: She is not in acute distress. Appearance: Normal appearance. She is not ill-appearing. Pulmonary:      Effort: Pulmonary effort is normal.   Neurological:      Mental Status: She is alert and oriented to person, place, and time. Due to this being a TeleHealth evaluation, many elements of the physical examination are unable to be assessed. We discussed the expected course, resolution and complications of the diagnosis(es) in detail. Medication risks, benefits, costs, interactions, and alternatives were discussed as indicated. I advised her to contact the office if her condition worsens, changes or fails to improve as anticipated. She expressed understanding with the diagnosis(es) and plan. Pursuant to the emergency declaration under the [facility], [facility] waiver authority and the [employer] and [employer] Act, this Virtual  Visit was conducted, with patient's consent, to reduce the patient's risk of exposure to COVID-19 and provide continuity of care for an established patient. Services were provided through a video synchronous discussion virtually to substitute for in-person clinic visit.     Litzy Lira MD

## 2021-10-01 NOTE — PROGRESS NOTES
Chief Complaint   Patient presents with   [address] Follow Up     1. Have you been to the ER, urgent care clinic since your last visit? Hospitalized since your last visit? Yes, 09/10/2021 [facility] ER, positive Covid test.    2. Have you seen or consulted any other health care providers outside of the [address] [place] [address] since your last visit? Include any pap smears or colon screening.   No

## 2021-10-20 ENCOUNTER — OFFICE VISIT (OUTPATIENT)
Dept: FAMILY MEDICINE CLINIC | Age: 51
End: 2021-10-20
Payer: COMMERCIAL

## 2021-10-20 ENCOUNTER — HOSPITAL ENCOUNTER (OUTPATIENT)
Dept: GENERAL RADIOLOGY | Age: 51
Discharge: HOME | End: 2021-10-20
Payer: COMMERCIAL

## 2021-10-20 VITALS
BODY MASS INDEX: 31.65 KG/M2 | OXYGEN SATURATION: 97 % | RESPIRATION RATE: 16 BRPM | SYSTOLIC BLOOD PRESSURE: 116 MMHG | HEIGHT: 65 IN | DIASTOLIC BLOOD PRESSURE: 61 MMHG | WEIGHT: 190 LBS | TEMPERATURE: 97.7 F | HEART RATE: 69 BPM

## 2021-10-20 DIAGNOSIS — R05.9 COUGH: ICD-10-CM

## 2021-10-20 DIAGNOSIS — J45.41 MODERATE PERSISTENT ASTHMA WITH EXACERBATION: ICD-10-CM

## 2021-10-20 DIAGNOSIS — J45.41 MODERATE PERSISTENT ASTHMA WITH EXACERBATION: Primary | ICD-10-CM

## 2021-10-20 DIAGNOSIS — Z12.31 ENCOUNTER FOR SCREENING MAMMOGRAM FOR MALIGNANT NEOPLASM OF BREAST: ICD-10-CM

## 2021-10-20 PROBLEM — M54.2 NECK PAIN: Status: ACTIVE | Noted: 2017-07-26

## 2021-10-20 PROBLEM — M54.50 LOW BACK PAIN: Status: ACTIVE | Noted: 2017-07-26

## 2021-10-20 PROCEDURE — 99213 OFFICE O/P EST LOW 20 MIN: CPT | Performed by: FAMILY MEDICINE

## 2021-10-20 PROCEDURE — 71046 X-RAY EXAM CHEST 2 VIEWS: CPT

## 2021-10-20 NOTE — PROGRESS NOTES
HISTORY OF PRESENT ILLNESS  Mariam Saravia is a 49 y.o. female. Patient presents with: Follow-up: ASTHMA FU DX WITH COVID [id number]  GETTING BETTER    Mariam Saravia is here to follow up on her asthma. At her last visit I increased her Pulmicort but she does not think it helps. She is unsure if she is using it right or not. Sometimes she is very tired. Review of Systems   Constitutional: Positive for malaise/fatigue. HENT: Positive for congestion. Negative for sore throat. Her mucous is clear  No loss of taste or smell   Respiratory: Positive for shortness of breath. Negative for cough and wheezing. Cardiovascular: Negative for chest pain. Gastrointestinal: Negative for abdominal pain, diarrhea, nausea and vomiting. Neurological: Negative for headaches. Visit Vitals  /61   Pulse 69   Temp 97.7 °F (36.5 °C) (Temporal)   Resp 16   Ht 5' 5\" (1.651 m)   Wt 190 lb (86.2 kg)   SpO2 97%   BMI 31.62 kg/m²     Physical Exam  Vitals and nursing note reviewed. Constitutional:       General: She is not in acute distress. Appearance: She is well-developed. She is not diaphoretic. Cardiovascular:      Rate and Rhythm: Normal rate and regular rhythm. Heart sounds: Normal heart sounds. No murmur heard. No friction rub. No gallop. Pulmonary:      Effort: Pulmonary effort is normal. No respiratory distress. Breath sounds: Normal breath sounds. No wheezing or rales. Skin:     General: Skin is warm and dry. Neurological:      Mental Status: She is alert and oriented to person, place, and time. ASSESSMENT and PLAN    ICD-10-CM ICD-9-CM    1. Moderate persistent asthma with exacerbation  J45.41 493.92 XR CHEST PA LAT   2. Cough  R05.9 786.2 XR CHEST PA LAT   3.  Encounter for screening mammogram for malignant neoplasm of breast  Z12.31 V76.12 JANE 3D BEST W MAMMO BI SCREENING INCL CAD        She brings in her Pulmicort and it does not appear that she is using it correctly  She is going to consult her pharmacist pertaining to proper inhaler use  chest X-ray  Mammogram     Follow-up and Dispositions    · Return in about 6 weeks (around 12/1/2021) for asthma. Reviewed plan of care. Patient has provided input and agrees with goals.

## 2021-10-20 NOTE — PROGRESS NOTES
Chief Complaint   Patient presents with    Follow-up     ASTHMA FU DX WITH COVID [id number]  GETTING BETTER

## 2021-10-24 ENCOUNTER — TELEPHONE (OUTPATIENT)
Dept: FAMILY MEDICINE CLINIC | Age: 51
End: 2021-10-24

## 2021-11-10 ENCOUNTER — OFFICE VISIT (OUTPATIENT)
Dept: NEUROLOGY | Age: 51
End: 2021-11-10
Payer: COMMERCIAL

## 2021-11-10 ENCOUNTER — TELEPHONE (OUTPATIENT)
Dept: NEUROLOGY | Age: 51
End: 2021-11-10

## 2021-11-10 VITALS
SYSTOLIC BLOOD PRESSURE: 116 MMHG | TEMPERATURE: 97.8 F | BODY MASS INDEX: 30.76 KG/M2 | HEART RATE: 73 BPM | DIASTOLIC BLOOD PRESSURE: 76 MMHG | OXYGEN SATURATION: 97 % | HEIGHT: 66 IN | WEIGHT: 191.4 LBS

## 2021-11-10 DIAGNOSIS — G43.719 CHRONIC MIGRAINE WITHOUT AURA, INTRACTABLE, WITHOUT STATUS MIGRAINOSUS: Primary | ICD-10-CM

## 2021-11-10 DIAGNOSIS — M54.81 BILATERAL OCCIPITAL NEURALGIA: ICD-10-CM

## 2021-11-10 DIAGNOSIS — G25.0 ESSENTIAL TREMOR: ICD-10-CM

## 2021-11-10 DIAGNOSIS — G44.229 CHRONIC TENSION-TYPE HEADACHE, NOT INTRACTABLE: ICD-10-CM

## 2021-11-10 DIAGNOSIS — M54.2 CERVICALGIA: ICD-10-CM

## 2021-11-10 DIAGNOSIS — Q28.3 CONGENITAL ANOMALY OF CEREBROVASCULAR SYSTEM: ICD-10-CM

## 2021-11-10 PROCEDURE — 99214 OFFICE O/P EST MOD 30 MIN: CPT | Performed by: PSYCHIATRY & NEUROLOGY

## 2021-11-10 RX ORDER — PREDNISONE 5 MG/1
TABLET ORAL SEE ADMIN INSTRUCTIONS
COMMUNITY

## 2021-11-10 RX ORDER — METAXALONE 800 MG/1
800 TABLET ORAL
Qty: 30 TABLET | Refills: 2 | Status: SHIPPED | OUTPATIENT
Start: 2021-11-10 | End: 2022-04-11

## 2021-11-10 RX ORDER — ATOGEPANT 60 MG/1
60 TABLET ORAL DAILY
Qty: 30 TABLET | Refills: 5
Start: 2021-11-10

## 2021-11-10 RX ORDER — FLUTICASONE FUROATE, UMECLIDINIUM BROMIDE AND VILANTEROL TRIFENATATE 200; 62.5; 25 UG/1; UG/1; UG/1
POWDER RESPIRATORY (INHALATION) DAILY
COMMUNITY

## 2021-11-10 RX ORDER — ATOGEPANT 60 MG/1
60 TABLET ORAL DAILY
Qty: 8 TABLET | Refills: 0 | Status: SHIPPED | COMMUNITY
Start: 2021-11-10

## 2021-11-10 RX ORDER — DICLOFENAC POTASSIUM 50 MG/1
POWDER, FOR SOLUTION ORAL
Qty: 9 PACKET | Refills: 3 | Status: SHIPPED | OUTPATIENT
Start: 2021-11-10

## 2021-11-10 NOTE — PROGRESS NOTES
NEUROLOGY CLINIC NOTE    Patient ID:  Lencho Cadena  [id number]  49 y.o.  1970    Date of Visit:  November 10, 2021    Reason for Visit: chronic headaches, neck pain, tremors    Chief Complaint   Patient presents with    Migraine    Tremors    Follow-up     occiptial neuralgia       History of Present Illness:     Patient Active Problem List    Diagnosis Date Noted    Severe obesity (BMI 35.0-39. 9) with comorbidity ([address] 04/16/2018    Low back pain 07/26/2017    Neck pain 07/26/2017    Chronic insomnia 03/28/2017    Non morbid obesity 03/28/2017    Vitamin D deficiency 10/20/2015    Allergic rhinitis 06/14/2013    Hypercholesteremia 01/23/2012    Allergy 07/14/2011    Mild intermittent asthma without complication 28/28/4207    Migraines 07/14/2011    GERD (gastroesophageal reflux disease) 07/14/2011    Headache(784.0) 07/14/2011    Cerebral vascular malformation 07/14/2011     Past Medical History:   Diagnosis Date    Allergic rhinitis 6/14/2013    Asthma     Chronic insomnia 3/28/2017    GERD (gastroesophageal reflux disease)     Headache     Headache(784.0)     Hypercholesteremia 1/23/2012    Non morbid obesity 3/28/2017    Snoring     Vitamin D deficiency 10/20/2015      Past Surgical History:   Procedure Laterality Date    ENDOSCOPY, COLON, DIAGNOSTIC  12/03 & 04/09    HX GYN      Laproscopy, BTL,TVH    HX GYN      hysterectomy    HX HYSTERECTOMY      HX OTHER SURGICAL  11/22/2019    left knee repair torn meniscus       Prior to Admission medications    Medication Sig Start Date End Date Taking? Authorizing Provider   fluticasone-umeclidin-vilanter (Trelegy Ellipta) 911-38.1-76 mcg dsdv Take  by inhalation daily. Yes Provider, Historical   predniSONE (STERAPRED) 5 mg dose pack Take  by mouth See Admin Instructions. See administration instruction per 5mg dose pack. Patient not sure of dosage.    Yes Provider, Historical   benzonatate (TESSALON) 200 mg capsule Take 1 Capsule by mouth three (3) times daily as needed for Cough. 10/1/21  Yes Elie Chung MD   Allergy Relief, fexofenadine, 180 mg tablet TAKE 1 TABLET BY MOUTH EVERY DAY AS NEEDED 9/16/21  Yes Elie Chung MD   Cambia 50 mg pwpk TAKE CONTENTS OF 1 PACKET BY MOUTH EVERY DAY AS NEEDED FOR HEADACHE 8/5/21  Yes Veronica Cruz MD   topiramate ER (Trokendi XR) 50 mg capsule Take 1 Capsule by mouth daily. In addition to 100 mg daily for a total of 150 mg daily. 6/25/21  Yes Veronica Cruz MD   topiramate ER (Trokendi XR) 100 mg capsule Take 1 Capsule by mouth daily. 6/25/21  Yes Veronica Cruz MD   propranoloL (INDERAL) 10 mg tablet Take 1 Tab by mouth two (2) times a day. 5/6/21  Yes Veronica Cruz MD   budesonide (Pulmicort Flexhaler) 90 mcg/actuation aepb inhaler Take 2 Puffs by inhalation two (2) times a day. Patient not taking: Reported on 11/10/2021 10/1/21   Elie Chung MD   valACYclovir (VALTREX) 500 mg tablet Take 500 mg by mouth as needed. Patient not taking: Reported on 11/10/2021 12/23/18   Provider, Hal   omeprazole (PRILOSEC) 40 mg capsule Take 1 Cap by mouth daily. Patient not taking: Reported on 11/10/2021 7/21/18   Rafi Berrios MD     Allergies   Allergen Reactions    Latex Rash    Aspirin Other (comments)     \"It doesn't digest in my system\"      Social History     Tobacco Use    Smoking status: Never Smoker    Smokeless tobacco: Never Used   Substance Use Topics    Alcohol use: Yes     Comment: Occasionally      Family History   Problem Relation Age of Onset    Hypertension Father     Kidney Disease Maternal Grandmother     Hypertension Maternal Grandmother     Heart Attack Paternal Grandfather     Hypertension Mother     Cancer Maternal Grandfather         lung        Subjective:      Arvid Olszewski is a 49 y.o.  RHAA female with history of chronic migraines, chronic tension headache, tremors, right occiput venous angioma and hyperlipidemia who is here for a follow up visit. Patient was a previous patient at Neurology [facility] and [facility] neurology. Patient was being seen for headaches. Onset 15 years ago. Severity: moderate to severe. It occurs daily, has more than one hour duration and is unchanged. Location was bitemporal and occipital. There was radiation to the neck and shoulders. The patient describes it as pressure and ache. Timing of headache: upon awakening. Denies aggravating factors. Denies relieving factors. Associated symptoms include phonophobia. Pertinent negatives include blurred vision, family history of migraine, nausea, photophobia, vertigo and vomiting. Per patient condition started since 2002. Previously seen by neurosurgery who did a brain MRI last 3/27/2002 which showed a small venous angioma right occipital lobe. Acute onset of two types of headache. One that is pressure like. 9/10 at the back of her head (R>L) and the other bitemporal achy like that is 5/10 in intensity. It lasts for hours but she does have headache free periods during the day. Associated with slowed thought process and noise sensitivity. Brain MRI done with and without contrast 10/20/2011 which was unremarkable except for the persistent right occipital venous anomaly. Patient was diagnosed with occipital neuralgia, chronic tension-type headache and congenital anomaly of the cerebrovascular system. When patient was seen at [facility] on 5/9/2012 she reported compliance with her medication regimen and she was having significant benefit. She was not having any major headaches and rarely uses the Fioricet. She was taking Propranolol 10 mg BID and Amitriptyline 30 mg at bedtime. Amitriptyline was helping her sleep. She also was evaluated last 10/29/14 and 12/31/2014 for dizziness and vertigo. She was seen by Dr. Robert De Anda (ENT) and was cleared. Patient was known to be none compliant with follow up visit when she is well.  She was last seen at [facility] 5/28/2015, then she reported improvement of her headaches in the sense that they are less intense but it still occurs everyday. Patient has previous tried Ibuprofen and Naproxen without any relief. The Cambia helps abort it immediately. BUPAP helps relieve it after 15 to 30 minutes and she takes it everyday. Denies any side effects. The headaches however recurs. She is also sleeping better with the Temazepam 30 mg at bedtime. Denies any sedating or cognitive side effects. She tries to correct her posture. She has been doing the neck and shoulder exercises. Plan was to continue her regimen. Since December 2017, patient reports worsening of her headache again. She apparently has been in and out of the [facility] from end of December 2017 to first week of January for respiratory issues with multiple prednisone treatments. The last 1/12/2018 patient was at the [facility] ER for severe headaches and underwent lumbar puncture. CSF studies were unremarkable. Elevated WBC (14.91). Since then, patient has been having abrupt onset when sitting or standing of bitemporal and bilateral occiput pain. Pressure like with neck stiffness. 8/10. Cristopher Escobar takes the edge off it. Associated with hot flashes and slight blurring of vision. Resolves with laying down. Patient did go back to the ER at [facility]. She did get a blood patch. It helped improve the positional headache and neck stiffness. Patient underwent a brain MRI with and without contrast 1/29/2018 was normal. No abnormal enhancements. Since the last visit on 8/5/2021, patient was doing well from a headache standpoint until more than 1 week PTC. About the same time she was started on a new inhaler (Trelegy Ellipta). She then started to have intractable migraine headaches. She was hospitalized at [facility] from 11/5 to 11/7/2021 for the headaches. Recalls a Head CT and brain MRI were done and reportedly unremarkable.  Upon discharge patient is still having moderate to severe daily headaches. No relief with current medication. Unable to obtain nurtec due to insurance issues. She is taking Cambia as needed. Options for abortive therapy is limited as patient cannot take triptan due to right occipital venous anomaly. She continues to take Trokendi XR a total of 150 mg daily. Denies any side effects. She continues to take Propranolol 10 mg BID. Denies any side effects. She continues to have intermittent neck and shoulder pains. She reports Methocarbamol 1000 mg at bedtime if not offering same benefit as Cyclobenzaprine. No side effects. 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 Outside reports reviewed: none    Review of Systems:    A comprehensive review of systems was negative except for those mentioned in the history    Objective:     Visit Vitals  /76   Pulse 73   Temp 97.8 °F (36.6 °C)   Ht 5' 5.5\" (1.664 m)   Wt 86.8 kg (191 lb 6.4 oz)   SpO2 97%   BMI 31.37 kg/m²     PHYSICAL EXAM:    NEUROLOGICAL EXAM:    Appearance: The patient is obese, provides a coherent history and is in no acute distress. Mental Status: Oriented to time, place and person. Fluent, no aphasia or dysarthria. Mood and affect appropriate. Cranial Nerves:   II - XII were intact. Motor:  5/5 strength. Normal bulk and tone. No fasciculations. Reflexes:   Deep tendon reflexes were symmetrical.   Sensory:   Normal to cold and vibration. Gait:  Normal gait. No Romberg. Tremor:   Mild postural UE hand tremors noted today. No cogwheel rigidity. Cerebellar:  Intact FTN/CASPER/HTS.      Anterior head posture (2-3 FB) with shoulders rotated in       Assessment:   Chronic migraine intractable without aura - worsening  Chronic tension type headache -   Essential tremors Cervicalgia  Occipital neuralgia   Venous anomaly    Plan:   1. Neurology exam is unchanged. It is still non-focal. Previous brain MRI with and without contrast was normal except for right occipital venous angioma. Continue Trokendi XR total of 150 mg total daily. Trial of Qulipta 60 mg daily for maintenance therapy. Sample and prescription were provided. Continue Cambia for abortive therapy. Prescription was provided. Trial of Ubrelvy 50 mg for abortive therapy. Orescription was provided. Advised to refrain from things that could trigger a headache (dairy, chocolate, wine and etc.). Advised to keep a headache log. Not a candidate for triptan's due to vascular malformation on brain MRI. Letter for work was provided. Need to obtain medical records from recent hospitalization. 2. Continue Propranolol 10 mg BID. 3. Mild postural UE tremors today. No intentional or resting hand tremors. No tone changes or cogwheel rigidity. No bradykinesia. No evidence at this time to support a diagnosis of Parkinson's disease. Findings consistent with essential tremors. Unremarkable brain MRI. Current regimen of Propranolol and Trokendi are actually medications used to treat tremors and should offer benefit. 4. Neck pain due to poor anterior head posture. Encouraged to continue to correct her anterior head posture. Advised to do the neck and shoulder exercises taught by therapy. Discontinue Cyclobenzaprine due to side effect. Discontinue Methocarbamol. Trial of Metaxalone 800 mg at bedtime. Prescription was provided. 5.  Exam reveals elements of emerging bilateral occipital neuralgia due to poor anterior head posture. Advised to correct her anterior head posture. Use headrest at work, at home and while driving. Do not carry anything on the shoulder. Use wireless headsets. Use only one pillow at most when sleeping. Advised to obtain helmet type head massager.  Obtain authorization for bilateral occipital nerve blocks. Trial of muscle relaxant as above. 6. Previous brain MRI showing right occipital venous angioma. Monitor for now. Not a triptan candidate. All questions and concerns were answered. This note was created using voice recognition software. Despite editing, there may be syntax errors.

## 2021-11-12 ENCOUNTER — HOSPITAL ENCOUNTER (OUTPATIENT)
Dept: MAMMOGRAPHY | Age: 51
Discharge: HOME | End: 2021-11-12
Attending: FAMILY MEDICINE
Payer: COMMERCIAL

## 2021-11-12 DIAGNOSIS — Z12.31 ENCOUNTER FOR SCREENING MAMMOGRAM FOR MALIGNANT NEOPLASM OF BREAST: ICD-10-CM

## 2021-11-12 PROCEDURE — 77063 BREAST TOMOSYNTHESIS BI: CPT

## 2021-11-18 ENCOUNTER — TELEPHONE (OUTPATIENT)
Dept: NEUROLOGY | Age: 51
End: 2021-11-18

## 2021-11-18 NOTE — TELEPHONE ENCOUNTER
Re: Skelaxin    Created PA case in [facility], Key# [id number]    Submitted and awaiting update.

## 2021-11-18 NOTE — TELEPHONE ENCOUNTER
Re: Jesse Hammer fax from pharmacy, created case in [address], Key# [id number]    Submitted and awaiting update.

## 2022-02-16 DIAGNOSIS — G43.719 CHRONIC MIGRAINE WITHOUT AURA, INTRACTABLE, WITHOUT STATUS MIGRAINOSUS: ICD-10-CM

## 2022-02-17 RX ORDER — TOPIRAMATE 50 MG/1
CAPSULE, EXTENDED RELEASE ORAL
Qty: 30 CAPSULE | Refills: 0 | Status: SHIPPED | OUTPATIENT
Start: 2022-02-17

## 2022-02-17 RX ORDER — TOPIRAMATE 100 MG/1
CAPSULE, EXTENDED RELEASE ORAL
Qty: 30 CAPSULE | Refills: 0 | Status: SHIPPED | OUTPATIENT
Start: 2022-02-17

## 2022-02-23 ENCOUNTER — TELEPHONE (OUTPATIENT)
Dept: NEUROLOGY | Age: 52
End: 2022-02-23

## 2022-02-23 NOTE — TELEPHONE ENCOUNTER
Re: Maicol PATEL request through [employer], created case in [facility], Key# [id number]    After submission rcvd error message and to call [employer] directly, might need to submit directly to Malinda Joyce.

## 2022-03-01 NOTE — TELEPHONE ENCOUNTER
Re: Upfront Chromatography plan and spoke with agent Daija who confirmed med does not require PA. Will update [employer].

## 2022-03-02 ENCOUNTER — OFFICE VISIT (OUTPATIENT)
Dept: ORTHOPEDIC SURGERY | Age: 52
End: 2022-03-02
Payer: COMMERCIAL

## 2022-03-02 VITALS — HEIGHT: 66 IN | WEIGHT: 191 LBS | BODY MASS INDEX: 30.7 KG/M2

## 2022-03-02 DIAGNOSIS — G57.62 MORTON'S NEUROMA OF THIRD INTERSPACE OF LEFT FOOT: ICD-10-CM

## 2022-03-02 DIAGNOSIS — M22.41 CHONDROMALACIA OF BOTH PATELLAE: Primary | ICD-10-CM

## 2022-03-02 DIAGNOSIS — M22.42 CHONDROMALACIA OF BOTH PATELLAE: Primary | ICD-10-CM

## 2022-03-02 PROCEDURE — 99214 OFFICE O/P EST MOD 30 MIN: CPT | Performed by: ORTHOPAEDIC SURGERY

## 2022-03-02 RX ORDER — DICLOFENAC SODIUM 20 MG/G
40 SOLUTION TOPICAL 2 TIMES DAILY
Qty: 1 EACH | Refills: 3 | Status: SHIPPED | OUTPATIENT
Start: 2022-03-02

## 2022-03-02 RX ORDER — METHYLPREDNISOLONE 4 MG/1
TABLET ORAL
Qty: 1 DOSE PACK | Refills: 0 | Status: SHIPPED | OUTPATIENT
Start: 2022-03-02

## 2022-03-02 NOTE — PROGRESS NOTES
Giovani Lopez (: 1970) is a 49 y.o. female, established patient, here for evaluation of the following chief complaint(s):  Knee Pain (bilateral)       ASSESSMENT/PLAN:  Below is the assessment and plan developed based on review of pertinent history, physical exam, labs, studies, and medications. Findings were discussed with the patient today. We discussed regimen of ice, anti-inflammatories, physical therapy, home exercise program, and activity modifications. If there is continued pain and symptoms then we will plan for follow-up in the next 4-6 weeks for further evaluation and treatment planning. We will consider corticosteroid injections if needed in the future. We discussed shoewear modifications for her left foot. 1. Chondromalacia of both patellae  2. Colon's neuroma of third interspace of left foot      Return in about 4 weeks (around 3/30/2022), or if symptoms worsen or fail to improve. SUBJECTIVE/OBJECTIVE:  Dorinda Nicole (: 1970) is a 49 y.o. female. She notes aching pain and clicking in both knees. Left knee is worse than the right. She describes left foot pain as well in between the third and fourth toes. She has a history of a left knee arthroscopy with chondroplasty and partial synovectomy. She has areas of grade 4 chondral changes at the patellofemoral joint. Allergies   Allergen Reactions    Latex Rash    Aspirin Other (comments)     \"It doesn't digest in my system\"       Current Outpatient Medications   Medication Sig    methylPREDNISolone (Medrol, Jose Antonio,) 4 mg tablet Use as directed    Trokendi XR 50 mg capsule TAKE 1 CAPSULE BY MOUTH EVERY DAY IN ADDITION  MG TO TOTAL 150 MG DAILY    Trokendi  mg capsule TAKE 1 CAPSULE BY MOUTH EVERY DAY    fluticasone-umeclidin-vilanter (Trelegy Ellipta) 200-62.5-25 mcg dsdv Take  by inhalation daily.  predniSONE (STERAPRED) 5 mg dose pack Take  by mouth See Admin Instructions.  See administration instruction per 5mg dose pack. Patient not sure of dosage.  Cambia 50 mg pwpk TAKE CONTENTS OF 1 PACKET BY MOUTH EVERY DAY AS NEEDED FOR HEADACHE    ubrogepant (Ubrelvy) 50 mg tablet Take 1 tab at onset of severe headache; may repeat another in 2 hours if headaches persist    atogepant (Qulipta) 60 mg tab Take 60 mg by mouth daily.  atogepant (Qulipta) 60 mg tab Take 60 mg by mouth daily.  metaxalone (SKELAXIN) 800 mg tablet Take 1 Tablet by mouth nightly.  benzonatate (TESSALON) 200 mg capsule Take 1 Capsule by mouth three (3) times daily as needed for Cough.  Allergy Relief, fexofenadine, 180 mg tablet TAKE 1 TABLET BY MOUTH EVERY DAY AS NEEDED    propranoloL (INDERAL) 10 mg tablet Take 1 Tab by mouth two (2) times a day. No current facility-administered medications for this visit. Social History     Socioeconomic History    Marital status:      Spouse name: Not on file    Number of children: Not on file    Years of education: Not on file    Highest education level: Not on file   Occupational History    Not on file   Tobacco Use    Smoking status: Never Smoker    Smokeless tobacco: Never Used   Vaping Use    Vaping Use: Never used   Substance and Sexual Activity    Alcohol use: Yes     Comment: Occasionally    Drug use: No    Sexual activity: Yes     Partners: Male     Birth control/protection: None   Other Topics Concern    Not on file   Social History Narrative    Not on file     Social Determinants of Health     Financial Resource Strain:     Difficulty of Paying Living Expenses: Not on file   Food Insecurity:     Worried About Running Out of Food in the Last Year: Not on file    Ranjith of Food in the Last Year: Not on file   Transportation Needs:     Lack of Transportation (Medical): Not on file    Lack of Transportation (Non-Medical):  Not on file   Physical Activity:     Days of Exercise per Week: Not on file    Minutes of Exercise per Session: Not on file   Stress:     Feeling of Stress : Not on file   Social Connections:     Frequency of Communication with Friends and Family: Not on file    Frequency of Social Gatherings with Friends and Family: Not on file    Attends Jehovah's witness Services: Not on file    Active Member of [address] [place] [address] or Organizations: Not on file    Attends Club or Organization Meetings: Not on file    Marital Status: Not on file   Intimate Partner Violence:     Fear of Current or Ex-Partner: Not on file    Emotionally Abused: Not on file    Physically Abused: Not on file    Sexually Abused: Not on file   Housing Stability:     Unable to Pay for Housing in the Last Year: Not on file    Number of [place] in the Last Year: Not on file    Unstable Housing in the Last Year: Not on file       Past Surgical History:   Procedure Laterality Date    ENDOSCOPY, COLON, DIAGNOSTIC   &     HX GYN      Laproscopy, BTL,TVH    HX GYN      hysterectomy    HX HYSTERECTOMY      HX OTHER SURGICAL  2019    left knee repair torn meniscus        Family History   Problem Relation Age of Onset    Hypertension Father     Kidney Disease Maternal Grandmother     Hypertension Maternal Grandmother     Heart Attack Paternal Grandfather     Hypertension Mother     Cancer Maternal Grandfather         lung        OB History        2    Para   2    Term   2            AB        Living           SAB        IAB        Ectopic        Molar        Multiple        Live Births   2                   REVIEW OF SYSTEMS:  ROS     Positive for: Musculoskeletal    Last edited by Jemal Schaffer on 3/2/2022  9:36 AM. (History)        Patient denies any recent fever, chills, nausea, vomiting, chest pain, or shortness of breath. Vitals:  Ht 5' 5.5\" (1.664 m)   Wt 191 lb (86.6 kg)   BMI 31.30 kg/m²    Body mass index is 31.3 kg/m². PHYSICAL EXAM:  General exam: Patient is awake, alert, and oriented x3. Well-appearing. No acute distress. Ambulates with an antalgic gait    Bilateral knees: Neurovascular and sensory intact. There is tenderness to palpation in the parapatellar region. There is crepitus with range of motion. No significant effusion noted. There is no joint line tenderness to palpation. Estefania's maneuver is nonpainful. Normal stability on Lachman's exam and anterior/posterior drawer testing. No erythema or ecchymosis. Left foot: There is tenderness palpation between the third and fourth metatarsal heads. Pain with compression of this area. IMAGING:  Previous x-rays and imaging of the left knee show evidence of chondral changes of the patellofemoral joint  XR Results (most recent):  Results from Hospital Encounter encounter on 10/20/21    XR CHEST PA LAT    Narrative  EXAM: XR CHEST PA LAT    INDICATION: Asthma and cough    COMPARISON: Chest dated 3/13/2018    TECHNIQUE: PA and lateral chest views    FINDINGS: The heart and lungs are normal in appearance. Impression  Negative chest.      Results from Hospital Encounter encounter on 08/04/19    XR FOOT LT MIN 3 V    Narrative  EXAM: XR FOOT LT MIN 3 V, XR ANKLE LT MIN 3 V    INDICATION: fall with swelling of the left knee, ankle and foot. COMPARISON: Left foot radiograph of 8/27/2017    FINDINGS: Three views of the left foot and 3 views of the left ankle demonstrate  no fracture or other acute osseous or articular abnormality. The soft tissues  are within normal limits. There is no significant arthritic change. Impression  IMPRESSION: No acute abnormality. XR ANKLE LT MIN 3 V    Narrative  EXAM: XR FOOT LT MIN 3 V, XR ANKLE LT MIN 3 V    INDICATION: fall with swelling of the left knee, ankle and foot. COMPARISON: Left foot radiograph of 8/27/2017    FINDINGS: Three views of the left foot and 3 views of the left ankle demonstrate  no fracture or other acute osseous or articular abnormality.  The soft tissues  are within normal limits. There is no significant arthritic change. Impression  IMPRESSION: No acute abnormality. Orders Placed This Encounter    methylPREDNISolone (Medrol, Jose Antonio,) 4 mg tablet     Sig: Use as directed     Dispense:  1 Dose Pack     Refill:  0              An electronic signature was used to authenticate this note.   -- Chetan Jones DO

## 2022-03-18 PROBLEM — F51.04 CHRONIC INSOMNIA: Status: ACTIVE | Noted: 2017-03-28

## 2022-03-19 PROBLEM — M54.50 LOW BACK PAIN: Status: ACTIVE | Noted: 2017-07-26

## 2022-03-19 PROBLEM — E66.9 NON MORBID OBESITY: Status: ACTIVE | Noted: 2017-03-28

## 2022-03-20 PROBLEM — M54.2 NECK PAIN: Status: ACTIVE | Noted: 2017-07-26

## 2022-03-20 PROBLEM — E66.01 SEVERE OBESITY (BMI 35.0-39.9) WITH COMORBIDITY (HCC): Status: ACTIVE | Noted: 2018-04-16

## 2022-04-11 DIAGNOSIS — M54.2 CERVICALGIA: ICD-10-CM

## 2022-04-11 RX ORDER — METAXALONE 800 MG/1
TABLET ORAL
Qty: 30 TABLET | Refills: 2 | Status: SHIPPED | OUTPATIENT
Start: 2022-04-11

## 2022-04-25 ENCOUNTER — OFFICE VISIT (OUTPATIENT)
Dept: ORTHOPEDIC SURGERY | Age: 52
End: 2022-04-25
Payer: COMMERCIAL

## 2022-04-25 VITALS — BODY MASS INDEX: 30.7 KG/M2 | WEIGHT: 191 LBS | HEIGHT: 66 IN

## 2022-04-25 DIAGNOSIS — M22.8X1 PATELLAR MALTRACKING, RIGHT: ICD-10-CM

## 2022-04-25 DIAGNOSIS — M22.41 CHONDROMALACIA OF BOTH PATELLAE: Primary | ICD-10-CM

## 2022-04-25 DIAGNOSIS — M22.42 CHONDROMALACIA OF BOTH PATELLAE: Primary | ICD-10-CM

## 2022-04-25 DIAGNOSIS — S83.241A ACUTE MEDIAL MENISCUS TEAR OF RIGHT KNEE, INITIAL ENCOUNTER: ICD-10-CM

## 2022-04-25 PROCEDURE — 99214 OFFICE O/P EST MOD 30 MIN: CPT | Performed by: ORTHOPAEDIC SURGERY

## 2022-04-25 RX ORDER — METHYLPREDNISOLONE 4 MG/1
TABLET ORAL
Qty: 1 DOSE PACK | Refills: 0 | Status: SHIPPED | OUTPATIENT
Start: 2022-04-25

## 2022-04-25 NOTE — LETTER
4/25/2022    Patient: Barbara Nicole   YOB: 1970   Date of Visit: 4/25/2022     Christopher Amado MD  [facility] [address]  [address]  [address] [facility] [address]    Dear Christopher Amado MD,      Thank you for referring Ms. Peyton Nicole to [facility] for evaluation. My notes for this consultation are attached. If you have questions, please do not hesitate to call me. I look forward to following your patient along with you.       Sincerely,    Colin Conteh, DO

## 2022-04-25 NOTE — PROGRESS NOTES
Kera Payne (: 1970) is a 49 y.o. female, established patient, here for evaluation of the following chief complaint(s):  Knee Pain (right)       ASSESSMENT/PLAN:  Below is the assessment and plan developed based on review of pertinent history, physical exam, labs, studies, and medications. As her right knee is hurting her the worst today we will obtain an MRI further evaluation. This will help with further treatment planning including possible surgical treatment. She will try Medrol Dosepak to decrease her acute inflammation of both knees. 1. Chondromalacia of both patellae  2. Acute medial meniscus tear of right knee, initial encounter  3. Patellar maltracking, right      Return for imaging results after study performed. SUBJECTIVE/OBJECTIVE:  Mihir Nicole (: 1970) is a 49 y.o. female. She notes increasing pain in the right knee. She notes occasional sharp pains with pivoting type movements. She also notes occasional swelling. She notes clicking and buckling symptoms. Her left knee remains achy and painful at the anterior knee. Allergies   Allergen Reactions    Latex Rash    Aspirin Other (comments)     \"It doesn't digest in my system\"       Current Outpatient Medications   Medication Sig    metaxalone (SKELAXIN) 800 mg tablet TAKE 1 TABLET BY MOUTH EVERY DAY AT NIGHT    methylPREDNISolone (Medrol, Jose Antonio,) 4 mg tablet Use as directed    diclofenac sodium (Pennsaid) 20 mg/gram /actuation(2 %) sopm 40 mg by Apply Externally route two (2) times a day.  Trokendi XR 50 mg capsule TAKE 1 CAPSULE BY MOUTH EVERY DAY IN ADDITION  MG TO TOTAL 150 MG DAILY    Trokendi  mg capsule TAKE 1 CAPSULE BY MOUTH EVERY DAY    fluticasone-umeclidin-vilanter (Trelegy Ellipta) 200-62.5-25 mcg dsdv Take  by inhalation daily.  predniSONE (STERAPRED) 5 mg dose pack Take  by mouth See Admin Instructions. See administration instruction per 5mg dose pack.  Patient not sure of dosage.  Cambia 50 mg pwpk TAKE CONTENTS OF 1 PACKET BY MOUTH EVERY DAY AS NEEDED FOR HEADACHE    ubrogepant (Ubrelvy) 50 mg tablet Take 1 tab at onset of severe headache; may repeat another in 2 hours if headaches persist    atogepant (Qulipta) 60 mg tab Take 60 mg by mouth daily.  atogepant (Qulipta) 60 mg tab Take 60 mg by mouth daily.  benzonatate (TESSALON) 200 mg capsule Take 1 Capsule by mouth three (3) times daily as needed for Cough.  Allergy Relief, fexofenadine, 180 mg tablet TAKE 1 TABLET BY MOUTH EVERY DAY AS NEEDED    propranoloL (INDERAL) 10 mg tablet Take 1 Tab by mouth two (2) times a day. No current facility-administered medications for this visit. Social History     Socioeconomic History    Marital status:      Spouse name: Not on file    Number of children: Not on file    Years of education: Not on file    Highest education level: Not on file   Occupational History    Not on file   Tobacco Use    Smoking status: Never Smoker    Smokeless tobacco: Never Used   Vaping Use    Vaping Use: Never used   Substance and Sexual Activity    Alcohol use: Yes     Comment: Occasionally    Drug use: No    Sexual activity: Yes     Partners: Male     Birth control/protection: None   Other Topics Concern    Not on file   Social History Narrative    Not on file     Social Determinants of Health     Financial Resource Strain:     Difficulty of Paying Living Expenses: Not on file   Food Insecurity:     Worried About Running Out of Food in the Last Year: Not on file    Ranjith of Food in the Last Year: Not on file   Transportation Needs:     Lack of Transportation (Medical): Not on file    Lack of Transportation (Non-Medical):  Not on file   Physical Activity:     Days of Exercise per Week: Not on file    Minutes of Exercise per Session: Not on file   Stress:     Feeling of Stress : Not on file   Social Connections:     Frequency of Communication with Friends and Family: Not on file    Frequency of Social Gatherings with Friends and Family: Not on file    Attends Gnosticist Services: Not on file    Active Member of Clubs or Organizations: Not on file    Attends Club or Organization Meetings: Not on file    Marital Status: Not on file   Intimate Partner Violence:     Fear of Current or Ex-Partner: Not on file    Emotionally Abused: Not on file    Physically Abused: Not on file    Sexually Abused: Not on file   Housing Stability:     Unable to Pay for Housing in the Last Year: Not on file    Number of [place] in the Last Year: Not on file    Unstable Housing in the Last Year: Not on file       Past Surgical History:   Procedure Laterality Date    ENDOSCOPY, COLON, DIAGNOSTIC   &     HX GYN      Laproscopy, BTL,TVH    HX GYN      hysterectomy    HX HYSTERECTOMY      HX OTHER SURGICAL  2019    left knee repair torn meniscus        Family History   Problem Relation Age of Onset    Hypertension Father     Kidney Disease Maternal Grandmother     Hypertension Maternal Grandmother     Heart Attack Paternal Grandfather     Hypertension Mother     Cancer Maternal Grandfather         lung        OB History        2    Para   2    Term   2            AB        Living           SAB        IAB        Ectopic        Molar        Multiple        Live Births   2                   REVIEW OF SYSTEMS:  ROS     Positive for: Musculoskeletal    Last edited by Jc Silva on 2022 10:15 AM. (History)        Patient denies any recent fever, chills, nausea, vomiting, chest pain, or shortness of breath. Vitals:  Ht 5' 5.5\" (1.664 m)   Wt 191 lb (86.6 kg)   BMI 31.30 kg/m²    Body mass index is 31.3 kg/m². PHYSICAL EXAM:  General exam: Patient is awake, alert, and oriented x3. Well-appearing. No acute distress. Ambulates with an antalgic gait    Bilateral knees: Neurovascular and sensory intact.   There is tenderness to palpation in the parapatellar region. There is crepitus with range of motion. No significant effusion noted. There is no joint line tenderness to palpation of the left knee. There is medial joint line pain to palpation on the right knee. Estefania's maneuver is painful on the right knee. Normal stability on Lachman's exam and anterior/posterior drawer testing. No erythema or ecchymosis. IMAGING:  Previous x-rays of the right knee show evidence of maintained joint space. No signs of fracture  XR Results (most recent):  Results from Hospital Encounter encounter on 10/20/21    XR CHEST PA LAT    Narrative  EXAM: XR CHEST PA LAT    INDICATION: Asthma and cough    COMPARISON: Chest dated 3/13/2018    TECHNIQUE: PA and lateral chest views    FINDINGS: The heart and lungs are normal in appearance. Impression  Negative chest.      Results from Hospital Encounter encounter on 08/04/19    XR FOOT LT MIN 3 V    Narrative  EXAM: XR FOOT LT MIN 3 V, XR ANKLE LT MIN 3 V    INDICATION: fall with swelling of the left knee, ankle and foot. COMPARISON: Left foot radiograph of 8/27/2017    FINDINGS: Three views of the left foot and 3 views of the left ankle demonstrate  no fracture or other acute osseous or articular abnormality. The soft tissues  are within normal limits. There is no significant arthritic change. Impression  IMPRESSION: No acute abnormality. XR ANKLE LT MIN 3 V    Narrative  EXAM: XR FOOT LT MIN 3 V, XR ANKLE LT MIN 3 V    INDICATION: fall with swelling of the left knee, ankle and foot. COMPARISON: Left foot radiograph of 8/27/2017    FINDINGS: Three views of the left foot and 3 views of the left ankle demonstrate  no fracture or other acute osseous or articular abnormality. The soft tissues  are within normal limits. There is no significant arthritic change. Impression  IMPRESSION: No acute abnormality.          No orders of the defined types were placed in this encounter. An electronic signature was used to authenticate this note.   -- Teresita Pereira, DO

## 2022-05-03 ENCOUNTER — TELEPHONE (OUTPATIENT)
Dept: ORTHOPEDIC SURGERY | Age: 52
End: 2022-05-03

## 2022-05-03 DIAGNOSIS — S83.241A ACUTE MEDIAL MENISCUS TEAR OF RIGHT KNEE, INITIAL ENCOUNTER: Primary | ICD-10-CM

## 2022-06-01 ENCOUNTER — NURSE TRIAGE (OUTPATIENT)
Dept: OTHER | Facility: CLINIC | Age: 52
End: 2022-06-01

## 2022-06-01 NOTE — TELEPHONE ENCOUNTER
Received call from [employer] at [facility] with Red Flag Complaint. Subjective: Caller states \"feet and leg swelling\"     Current Symptoms: feet and leg swelling, painful walking    Onset: 1 month ago; worsening    Associated Symptoms: reduced activity    Pain Severity: 5/10; throbbing; intermittent    Temperature: denies    What has been tried: elevating helps a little    LMP: NA Pregnant: NA    Recommended disposition: See in Office Today for further evaluation of lower extremity swelling. Care advice provided, patient verbalizes understanding; denies any other questions or concerns; instructed to call back for any new or worsening symptoms. Patient/Caller agrees with recommended disposition; writer provided warm transfer to Alejandra Clayton at [facility] for appointment scheduling    Attention Provider: Thank you for allowing me to participate in the care of your patient. The patient was connected to triage in response to information provided to the [facility]. Please do not respond through this encounter as the response is not directed to a shared pool.       Reason for Disposition   MODERATE swelling of both ankles (e.g., swelling extends up to the knees) AND new-onset or worsening    Protocols used: LEG SWELLING AND EDEMA-ADULT-OH

## 2022-06-03 ENCOUNTER — OFFICE VISIT (OUTPATIENT)
Dept: ORTHOPEDIC SURGERY | Age: 52
End: 2022-06-03
Payer: COMMERCIAL

## 2022-06-03 VITALS — HEIGHT: 66 IN | BODY MASS INDEX: 30.7 KG/M2 | WEIGHT: 191 LBS

## 2022-06-03 DIAGNOSIS — M22.41 CHONDROMALACIA OF BOTH PATELLAE: ICD-10-CM

## 2022-06-03 DIAGNOSIS — M22.8X1 PATELLAR MALTRACKING, RIGHT: Primary | ICD-10-CM

## 2022-06-03 DIAGNOSIS — M17.11 LOCALIZED OSTEOARTHRITIS OF RIGHT KNEE: ICD-10-CM

## 2022-06-03 DIAGNOSIS — M22.42 CHONDROMALACIA OF BOTH PATELLAE: ICD-10-CM

## 2022-06-03 PROCEDURE — 20610 DRAIN/INJ JOINT/BURSA W/O US: CPT | Performed by: ORTHOPAEDIC SURGERY

## 2022-06-03 PROCEDURE — 99214 OFFICE O/P EST MOD 30 MIN: CPT | Performed by: ORTHOPAEDIC SURGERY

## 2022-06-03 RX ORDER — TRIAMCINOLONE ACETONIDE 40 MG/ML
40 INJECTION, SUSPENSION INTRA-ARTICULAR; INTRAMUSCULAR ONCE
Status: DISCONTINUED | OUTPATIENT
Start: 2022-06-03 | End: 2022-06-03

## 2022-06-03 RX ORDER — BUPIVACAINE HYDROCHLORIDE 2.5 MG/ML
6 INJECTION, SOLUTION INFILTRATION; PERINEURAL ONCE
Status: DISCONTINUED | OUTPATIENT
Start: 2022-06-03 | End: 2022-06-03

## 2022-06-03 NOTE — LETTER
6/6/2022    Patient: Sara Nicole   YOB: 1970   Date of Visit: 6/3/2022     Bobbi Bond MD  [address]  [address] [facility]  [address] [facility]  [place] [place]    Dear Bobbi Bond MD,      Thank you for referring Ms. Peyton Nicole to [facility] for evaluation. My notes for this consultation are attached. If you have questions, please do not hesitate to call me. I look forward to following your patient along with you.       Sincerely,    Shandaorrshruthi Corado, DO

## 2022-06-03 NOTE — PROGRESS NOTES
Karma Winston (: 1970) is a 49 y.o. female, established patient, here for evaluation of the following chief complaint(s):  Knee Pain       ASSESSMENT/PLAN:  Below is the assessment and plan developed based on review of pertinent history, physical exam, labs, studies, and medications. She elected to try corticosteroid injection of the right knee today. We discussed the risks and benefits of injection and informed consent was obtained. After a sterile preparation, 6 cc of bupivicaine and 40 mg of Kenalog were injected into the right knee. The patient tolerated the procedure well and there were no complications. Post injection pain, blood sugar elevation, skin discoloration, fatty atrophy and the signs of infection were discussed in detail. The patient was instructed to contact us if there were any questions or concerns prior to their follow up appointment. 1. Patellar maltracking, right  -     bupivacaine HCl (MARCAINE) 0.25 % (2.5 mg/mL) injection 15 mg; 15 mg (6 mL), Other, ONCE, 1 dose, On Fri 6/3/22 at 1100  -     triamcinolone acetonide (KENALOG-40) 40 mg/mL injection 40 mg; 40 mg, Intra artICUlar, ONCE, 1 dose, On Fri 6/3/22 at 1100  2. Chondromalacia of both patellae  3. Localized osteoarthritis of right knee  -     bupivacaine HCl (MARCAINE) 0.25 % (2.5 mg/mL) injection 15 mg; 15 mg (6 mL), Other, ONCE, 1 dose, On Fri 6/3/22 at 1100  -     triamcinolone acetonide (KENALOG-40) 40 mg/mL injection 40 mg; 40 mg, Intra artICUlar, ONCE, 1 dose, On Fri 6/3/22 at 1100      Return if symptoms worsen or fail to improve. SUBJECTIVE/OBJECTIVE:  Noemi Nicole (: 1970) is a 49 y.o. female. She notes continued sharp aching pain in the anterior right knee that occurs with going up and down stairs. She notes occasional clicking and swelling.   She notes occasional buckling symptoms as well      Allergies   Allergen Reactions    Latex Rash    Aspirin Other (comments)     \"It doesn't digest in my system\"       Current Outpatient Medications   Medication Sig    methylPREDNISolone (MEDROL DOSEPACK) 4 mg tablet Per dose pack instructions    metaxalone (SKELAXIN) 800 mg tablet TAKE 1 TABLET BY MOUTH EVERY DAY AT NIGHT    methylPREDNISolone (Medrol, Jose Antonio,) 4 mg tablet Use as directed    diclofenac sodium (Pennsaid) 20 mg/gram /actuation(2 %) sopm 40 mg by Apply Externally route two (2) times a day.  Trokendi XR 50 mg capsule TAKE 1 CAPSULE BY MOUTH EVERY DAY IN ADDITION  MG TO TOTAL 150 MG DAILY    Trokendi  mg capsule TAKE 1 CAPSULE BY MOUTH EVERY DAY    fluticasone-umeclidin-vilanter (Trelegy Ellipta) 200-62.5-25 mcg dsdv Take  by inhalation daily.  predniSONE (STERAPRED) 5 mg dose pack Take  by mouth See Admin Instructions. See administration instruction per 5mg dose pack. Patient not sure of dosage.  Cambia 50 mg pwpk TAKE CONTENTS OF 1 PACKET BY MOUTH EVERY DAY AS NEEDED FOR HEADACHE    ubrogepant (Ubrelvy) 50 mg tablet Take 1 tab at onset of severe headache; may repeat another in 2 hours if headaches persist    atogepant (Qulipta) 60 mg tab Take 60 mg by mouth daily.  atogepant (Qulipta) 60 mg tab Take 60 mg by mouth daily.  benzonatate (TESSALON) 200 mg capsule Take 1 Capsule by mouth three (3) times daily as needed for Cough.  Allergy Relief, fexofenadine, 180 mg tablet TAKE 1 TABLET BY MOUTH EVERY DAY AS NEEDED    propranoloL (INDERAL) 10 mg tablet Take 1 Tab by mouth two (2) times a day. No current facility-administered medications for this visit.        Social History     Socioeconomic History    Marital status:      Spouse name: Not on file    Number of children: Not on file    Years of education: Not on file    Highest education level: Not on file   Occupational History    Not on file   Tobacco Use    Smoking status: Never Smoker    Smokeless tobacco: Never Used   Vaping Use    Vaping Use: Never used   Substance and Sexual Activity    Alcohol use: Yes     Comment: Occasionally    Drug use: No    Sexual activity: Yes     Partners: Male     Birth control/protection: None   Other Topics Concern    Not on file   Social History Narrative    Not on file     Social Determinants of Health     Financial Resource Strain:     Difficulty of Paying Living Expenses: Not on file   Food Insecurity:     Worried About Running Out of Food in the Last Year: Not on file    Ranjith of Food in the Last Year: Not on file   Transportation Needs:     Lack of Transportation (Medical): Not on file    Lack of Transportation (Non-Medical):  Not on file   Physical Activity:     Days of Exercise per Week: Not on file    Minutes of Exercise per Session: Not on file   Stress:     Feeling of Stress : Not on file   Social Connections:     Frequency of Communication with Friends and Family: Not on file    Frequency of Social Gatherings with Friends and Family: Not on file    Attends Congregational Services: Not on file    Active Member of [address] [place] [address] or Organizations: Not on file    Attends Club or Organization Meetings: Not on file    Marital Status: Not on file   Intimate Partner Violence:     Fear of Current or Ex-Partner: Not on file    Emotionally Abused: Not on file    Physically Abused: Not on file    Sexually Abused: Not on file   Housing Stability:     Unable to Pay for Housing in the Last Year: Not on file    Number of [place] in the Last Year: Not on file    Unstable Housing in the Last Year: Not on file       Past Surgical History:   Procedure Laterality Date    ENDOSCOPY, COLON, DIAGNOSTIC  12/03 & 04/09    HX GYN      Laproscopy, BTL,TVH    HX GYN      hysterectomy    HX HYSTERECTOMY      HX OTHER SURGICAL  11/22/2019    left knee repair torn meniscus        Family History   Problem Relation Age of Onset    Hypertension Father     Kidney Disease Maternal Grandmother     Hypertension Maternal Grandmother     Heart Attack Paternal Grandfather     Hypertension Mother     Cancer Maternal Grandfather         lung        OB History        2    Para   2    Term   2            AB        Living           SAB        IAB        Ectopic        Molar        Multiple        Live Births   2                   REVIEW OF SYSTEMS:  ROS     Positive for: Musculoskeletal    Last edited by Case Portillo on 6/3/2022  9:51 AM. (History)        Patient denies any recent fever, chills, nausea, vomiting, chest pain, or shortness of breath. Vitals:  Ht 5' 5.5\" (1.664 m)   Wt 191 lb (86.6 kg)   BMI 31.30 kg/m²    Body mass index is 31.3 kg/m². PHYSICAL EXAM:  General exam: Patient is awake, alert, and oriented x3. Well-appearing. No acute distress. Ambulates with an antalgic gait    Right knee: Neurovascular and sensory intact. There is tenderness to palpation in the parapatellar region. There is crepitus with range of motion. No significant effusion noted. There is no joint line tenderness to palpation. Estefania's maneuver is nonpainful. Normal stability on Lachman's exam and anterior/posterior drawer testing. No erythema or ecchymosis. IMAGING:  MRI Results (most recent):  Results from Appointment encounter on 22    MRI KNEE RT WO CONT    Narrative  EXAM: MRI KNEE RT WO CONT    INDICATION: Pain    COMPARISON: None    TECHNIQUE: Axial T2 fat-saturated and proton density fat-saturated; coronal T1  and proton density fat-saturated; and sagittal T2 fat-saturated, proton density  fat-saturated, and gradient echo MRI of the right knee . CONTRAST: None. FINDINGS: Bone marrow: Curvilinear subchondral signal subjacent to the lateral  trochlear facet in a pattern consistent with chronic avascular necrosis. Subchondral reactive bony signal is furthermore noted in the lateral patellar  facet. Joint fluid: Small to moderate knee effusion.  No Baker's cyst.    Collateral ligaments and posterior, lateral corner: Intact. Medial meniscus: Intact. Lateral meniscus: Intact. ACL and PCL: Intact. Tendons: Tendons are intact. There is mild to moderate prepatellar and  superficial infrapatellar bursitis. Muscles: Within normal limits. Patellofemoral alignment: There is mild edema in Hoffa's fat pad demonstrated  superolaterally. There is mild patella luis a with mild lateral patellar  subluxation. Described TT-TG distance: 19 mm. TT-PCL distance: 17 mm. Articular cartilage: There is a grade 2 through grade 4 chondral derangement  extensively shown in the mid and inferior lateral patellar facet and median  ridge of the patella. There is a small area of grade 2 chondral derangement in  the superior and mid medial trochlear facet, with tiny central osteophyte. There  are small patellofemoral marginal osteophytes. No substantial medial or lateral  compartment chondral derangement is shown though tiny marginal osteophytes are  noted. Soft tissue mass: None. Impression  1. Area of chronic appearing avascular necrosis at the subchondral aspect of the  lateral trochlear facet. 2. Hoffa's fat pad impingement sign with mild patella luis a and lateral patellar  subluxation. No evidence for trochlear hypoplasia. 3. Moderately extensive grade 2 through 4 chondral derangement in the lateral  patellar facet and median ridge of the patella. Small area of intermediate grade  chondral derangement in the medial patellar facet. 4. No meniscal, ligamentous or tendinous derangement demonstrated. Mild-moderate  prepatellar and superficial infrapatellar bursitis. XR Results (most recent):  Results from Hospital Encounter encounter on 10/20/21    XR CHEST PA LAT    Narrative  EXAM: XR CHEST PA LAT    INDICATION: Asthma and cough    COMPARISON: Chest dated 3/13/2018    TECHNIQUE: PA and lateral chest views    FINDINGS: The heart and lungs are normal in appearance.     Impression  Negative chest.      [facility]19    XR FOOT LT MIN 3 V    Narrative  EXAM: XR FOOT LT MIN 3 V, XR ANKLE LT MIN 3 V    INDICATION: fall with swelling of the left knee, ankle and foot. COMPARISON: Left foot radiograph of 8/27/2017    FINDINGS: Three views of the left foot and 3 views of the left ankle demonstrate  no fracture or other acute osseous or articular abnormality. The soft tissues  are within normal limits. There is no significant arthritic change. Impression  IMPRESSION: No acute abnormality. XR ANKLE LT MIN 3 V    Narrative  EXAM: XR FOOT LT MIN 3 V, XR ANKLE LT MIN 3 V    INDICATION: fall with swelling of the left knee, ankle and foot. COMPARISON: Left foot radiograph of 8/27/2017    FINDINGS: Three views of the left foot and 3 views of the left ankle demonstrate  no fracture or other acute osseous or articular abnormality. The soft tissues  are within normal limits. There is no significant arthritic change. Impression  IMPRESSION: No acute abnormality. Orders Placed This Encounter    bupivacaine HCl (MARCAINE) 0.25 % (2.5 mg/mL) injection 15 mg    triamcinolone acetonide (KENALOG-40) 40 mg/mL injection 40 mg              An electronic signature was used to authenticate this note.   -- Ellie Fake, DO

## 2022-06-03 NOTE — PATIENT INSTRUCTIONS
Knee: Exercises  Introduction  Here are some examples of exercises for you to try. The exercises may be suggested for a condition or for rehabilitation. Start each exercise slowly. Ease off the exercises if you start to have pain. You will be told when to start these exercises and which ones will work best for you. How to do the exercises  Quad sets    1. Sit with your leg straight and supported on the floor or a firm bed. (If you feel discomfort in the front or back of your knee, place a small towel roll under your knee.)  2. Tighten the muscles on top of your thigh by pressing the back of your knee flat down to the floor. (If you feel discomfort under your kneecap, place a small towel roll under your knee.)  3. Hold for about 6 seconds, then rest for up to 10 seconds. 4. Do 8 to 12 repetitions several times a day. Straight-leg raises to the front    1. Lie on your back with your good knee bent so that your foot rests flat on the floor. Your injured leg should be straight. Make sure that your low back has a normal curve. You should be able to slip your flat hand in between the floor and the small of your back, with your palm touching the floor and your back touching the back of your hand. 2. Tighten the thigh muscles in the injured leg by pressing the back of your knee flat down to the floor. Hold your knee straight. 3. Keeping the thigh muscles tight, lift your injured leg up so that your heel is about 12 inches off the floor. Hold for about 6 seconds and then lower slowly. 4. Do 8 to 12 repetitions, 3 times a day. Straight-leg raises to the outside    1. Lie on your side, with your injured leg on top. 2. Tighten the front thigh muscles of your injured leg to keep your knee straight. 3. Keep your hip and your leg straight in line with the rest of your body, and keep your knee pointing forward. Do not drop your hip back.   4. Lift your injured leg straight up toward the ceiling, about 12 inches off the floor. Hold for about 6 seconds, then slowly lower your leg. 5. Do 8 to 12 repetitions. Straight-leg raises to the back    1. Lie on your stomach, and lift your leg straight up behind you (toward the ceiling). 2. Lift your toes about 6 inches off the floor, hold for about 6 seconds, then lower slowly. 3. Do 8 to 12 repetitions. Straight-leg raises to the inside    1. Lie on the side of your body with the injured leg. 2. You can either prop your other (good) leg up on a chair, or you can bend your good knee and put that foot in front of your injured knee. Do not drop your hip back. 3. Tighten the muscles on the front of your thigh to straighten your injured knee. 4. Keep your kneecap pointing forward, and lift your whole leg up toward the ceiling about 6 inches. Hold for about 6 seconds, then lower slowly. 5. Do 8 to 12 repetitions. Heel dig bridging    1. Lie on your back with both knees bent and your ankles bent so that only your heels are digging into the floor. Your knees should be bent about 90 degrees. 2. Then push your heels into the floor, squeeze your buttocks, and lift your hips off the floor until your shoulders, hips, and knees are all in a straight line. 3. Hold for about 6 seconds as you continue to breathe normally, and then slowly lower your hips back down to the floor and rest for up to 10 seconds. 4. Do 8 to 12 repetitions. Hamstring curls    1. Lie on your stomach with your knees straight. If your kneecap is uncomfortable, roll up a washcloth and put it under your leg just above your kneecap. 2. Lift the foot of your injured leg by bending the knee so that you bring the foot up toward your buttock. If this motion hurts, try it without bending your knee quite as far. This may help you avoid any painful motion. 3. Slowly lower your leg back to the floor. 4. Do 8 to 12 repetitions.   5. With permission from your doctor or physical therapist, you may also want to add a cuff weight to your ankle (not more than 5 pounds). With weight, you do not have to lift your leg more than 12 inches to get a hamstring workout. Shallow standing knee bends    Do this exercise only if you have very little pain; if you have no clicking, locking, or giving way if you have an injured knee; and if it does not hurt while you are doing 8 to 12 repetitions. 1. Stand with your hands lightly resting on a counter or chair in front of you. Put your feet shoulder-width apart. 2. Slowly bend your knees so that you squat down like you are going to sit in a chair. Make sure your knees do not go in front of your toes. 3. Lower yourself about 6 inches. Your heels should remain on the floor at all times. 4. Rise slowly to a standing position. Heel raises    1. Stand with your feet a few inches apart, with your hands lightly resting on a counter or chair in front of you. 2. Slowly raise your heels off the floor while keeping your knees straight. 3. Hold for about 6 seconds, then slowly lower your heels to the floor. 4. Do 8 to 12 repetitions several times during the day. Follow-up care is a key part of your treatment and safety. Be sure to make and go to all appointments, and call your doctor if you are having problems. It's also a good idea to know your test results and keep a list of the medicines you take. Where can you learn more? Go to http://www.gray.com/  Enter P653 in the search box to learn more about \"Knee: Exercises. \"  Current as of: July 1, 2021               Content Version: 13.2  © 3192-4062 [employer]. Care instructions adapted under license by [employer] (which disclaims liability or warranty for this information). If you have questions about a medical condition or this instruction, always ask your healthcare professional. Kayley Keys any warranty or liability for your use of this information.

## 2022-06-06 RX ORDER — TRIAMCINOLONE ACETONIDE 40 MG/ML
40 INJECTION, SUSPENSION INTRA-ARTICULAR; INTRAMUSCULAR ONCE
Status: COMPLETED | OUTPATIENT
Start: 2022-06-06 | End: 2022-06-06

## 2022-06-06 RX ORDER — BUPIVACAINE HYDROCHLORIDE 2.5 MG/ML
6 INJECTION, SOLUTION INFILTRATION; PERINEURAL ONCE
Status: COMPLETED | OUTPATIENT
Start: 2022-06-06 | End: 2022-06-06

## 2022-06-06 RX ADMIN — BUPIVACAINE HYDROCHLORIDE 15 MG: 2.5 INJECTION, SOLUTION INFILTRATION; PERINEURAL at 13:04

## 2022-06-06 RX ADMIN — TRIAMCINOLONE ACETONIDE 40 MG: 40 INJECTION, SUSPENSION INTRA-ARTICULAR; INTRAMUSCULAR at 13:05

## 2022-07-13 DIAGNOSIS — M17.11 LOCALIZED OSTEOARTHRITIS OF RIGHT KNEE: Primary | ICD-10-CM

## 2022-07-13 RX ORDER — HYALURONATE SOD, CROSS-LINKED 30 MG/3 ML
30 SYRINGE (ML) INTRAARTICULAR ONCE
Qty: 1 EACH | Refills: 0 | Status: SHIPPED | OUTPATIENT
Start: 2022-07-13 | End: 2022-07-13

## 2022-08-05 DIAGNOSIS — M17.12 LOCALIZED OSTEOARTHRITIS OF LEFT KNEE: ICD-10-CM

## 2022-08-05 DIAGNOSIS — M17.0 BILATERAL PRIMARY OSTEOARTHRITIS OF KNEE: Primary | ICD-10-CM

## 2022-08-05 DIAGNOSIS — M17.11 LOCALIZED OSTEOARTHRITIS OF RIGHT KNEE: Primary | ICD-10-CM

## 2022-08-05 RX ORDER — HYALURONATE SODIUM, STABILIZED 88 MG/4 ML
SYRINGE (ML) INTRAARTICULAR
Qty: 8 ML | Refills: 0 | Status: SHIPPED | COMMUNITY
Start: 2022-08-05

## 2022-08-05 RX ORDER — HYALURONATE SODIUM, STABILIZED 88 MG/4 ML
88 SYRINGE (ML) INTRAARTICULAR ONCE
Qty: 4 ML | Refills: 0 | Status: SHIPPED | OUTPATIENT
Start: 2022-08-05 | End: 2022-08-05

## 2022-08-29 ENCOUNTER — TELEPHONE (OUTPATIENT)
Dept: FAMILY MEDICINE CLINIC | Age: 52
End: 2022-08-29

## 2022-08-29 ENCOUNTER — NURSE TRIAGE (OUTPATIENT)
Dept: OTHER | Facility: CLINIC | Age: 52
End: 2022-08-29

## 2022-08-29 NOTE — TELEPHONE ENCOUNTER
Patient was transferred from nurse triage for some eye pain. Patient wanted to be put on the schedule, but the earliest available appt was 9/7. Discussed emergency care options with patient, who decided to report to [facility]. Informed patient we would pass this message to clinical just in case.

## 2022-08-29 NOTE — TELEPHONE ENCOUNTER
Received call from Brea at [facility] with Red Flag Complaint. Subjective: Caller states \"Friday to Saturday eye was swelling, Sunday was worse\"     Current Symptoms: right eye pain/problem, states blurred vision near pupil even when wearing glasses  This morning eye had crusted drainage and was penitentiary shut. Also has eye pain. Also has headache. Normally has headaches but thinks it could be caused by this, unsure. Also reports redness and swelling above right eye. Onset: 3 days ago; gradual    Associated Symptoms: NA    Pain Severity: 7/10; soreness; constant    Temperature: doesn't feel any fever or chills     What has been tried: states can't take OTC meds, has tried compresses and witch hazel    LMP: NA Pregnant: NA    Recommended disposition: Go to ED/[facility] (Or to Office with PCP Approval)    Care advice provided, patient verbalizes understanding; denies any other questions or concerns; instructed to call back for any new or worsening symptoms. Writer provided warm transfer to Hien Massey at [facility] for 2nd level triage    Attention Provider: Thank you for allowing me to participate in the care of your patient. The patient was connected to triage in response to information provided to the [facility]. Please do not respond through this encounter as the response is not directed to a shared pool.       Reason for Disposition   Blurred vision and new or worsening    Protocols used: Eye Pain and Other Symptoms-ADULT-[place]

## 2022-11-11 ENCOUNTER — OFFICE VISIT (OUTPATIENT)
Dept: ORTHOPEDIC SURGERY | Age: 52
End: 2022-11-11
Payer: COMMERCIAL

## 2022-11-11 VITALS — HEIGHT: 66 IN | BODY MASS INDEX: 30.7 KG/M2 | WEIGHT: 191 LBS

## 2022-11-11 DIAGNOSIS — G89.29 CHRONIC PAIN OF RIGHT KNEE: Primary | ICD-10-CM

## 2022-11-11 DIAGNOSIS — M17.11 ARTHRITIS OF KNEE, RIGHT: ICD-10-CM

## 2022-11-11 DIAGNOSIS — M25.561 CHRONIC PAIN OF RIGHT KNEE: Primary | ICD-10-CM

## 2022-11-11 PROCEDURE — 99214 OFFICE O/P EST MOD 30 MIN: CPT | Performed by: ORTHOPAEDIC SURGERY

## 2022-11-11 RX ORDER — DICLOFENAC SODIUM 50 MG/1
50 TABLET, DELAYED RELEASE ORAL 2 TIMES DAILY WITH MEALS
Qty: 60 TABLET | Refills: 0 | Status: SHIPPED | OUTPATIENT
Start: 2022-11-11 | End: 2022-11-14 | Stop reason: SDUPTHER

## 2022-11-11 NOTE — PROGRESS NOTES
Agustin Deutsch (: 1970) is a 47 y.o. female patient, here for evaluation of the following chief complaint(s):  Knee Pain (Right knee pain/)       ASSESSMENT/PLAN:  Below is the assessment and plan developed based on review of pertinent history, physical exam, labs, studies, and medications. 50-year-old female comes in today complaining of right-sided anterior knee pain. She had injury several years ago while in [facility].  Since then she has always had issues. They have been progressively worse. She now has pain every time she goes up and down the stairs. She has pain with driving. She also has pain with normal day-to-day activities. She is seen my partner and had injections as well as other conservative measures. She did have physical therapy although has been a long time. I independently reviewed all of her records as well as her MRI and x-rays. MRI reveals grade 4 changes in both the trochlea and the patella. The rest of her knee looks relatively well-preserved. We discussed continue conservative management considering her young age. I am going to get an aggressive physical therapy for 6 weeks. I gave her diclofenac. I made a deal with her and told her if she is 90 better after the 6 weeks we could consider patellofemoral replacement. She will let us know how she is doing in January. 1. Chronic pain of right knee  -     XR KNEE RT MIN 4 V; Future  2. Arthritis of knee, right  -     REFERRAL TO PHYSICAL THERAPY      Encounter Diagnoses   Name Primary? Chronic pain of right knee Yes    Arthritis of knee, right         No follow-ups on file. SUBJECTIVE/OBJECTIVE:  Coby Nicole (: 1970) is a 47 y.o. female who presents today for the following:  Chief Complaint   Patient presents with    Knee Pain     Right knee pain         50-year-old female comes in today complaining of right-sided anterior knee pain.   She had injury several years ago while in [facility]. Since then she has always had issues. They have been progressively worse. She now has pain every time she goes up and down the stairs. She has pain with driving. She also has pain with normal day-to-day activities. She is seen my partner and had injections as well as other conservative measures. IMAGING:    I independently reviewed x-rays that were ordered today including 4 views of the right knee. I also reviewed previous MRI. X-rays reveal relatively well-preserved joint space with mild patella maltracking. The MRI reveals grade 4 defect in the trochlea and the patella  XR Results (most recent):  Results from Hospital Encounter encounter on 10/20/21    XR CHEST PA LAT    Narrative  EXAM: XR CHEST PA LAT    INDICATION: Asthma and cough    COMPARISON: Chest dated 3/13/2018    TECHNIQUE: PA and lateral chest views    FINDINGS: The heart and lungs are normal in appearance. Impression  Negative chest.       Allergies   Allergen Reactions    Latex Rash    Aspirin Other (comments)     \"It doesn't digest in my system\"       Current Outpatient Medications   Medication Sig    diclofenac EC (VOLTAREN) 50 mg EC tablet Take 1 Tablet by mouth two (2) times daily (with meals). Monovisc 88 mg/4 mL syrg injection Inject 1 prefilled syringe into each knee  Indications: osteoarthritis of the knee    methylPREDNISolone (MEDROL DOSEPACK) 4 mg tablet Per dose pack instructions    metaxalone (SKELAXIN) 800 mg tablet TAKE 1 TABLET BY MOUTH EVERY DAY AT NIGHT    methylPREDNISolone (Medrol, Jose Antonio,) 4 mg tablet Use as directed    Trokendi XR 50 mg capsule TAKE 1 CAPSULE BY MOUTH EVERY DAY IN ADDITION  MG TO TOTAL 150 MG DAILY    Trokendi  mg capsule TAKE 1 CAPSULE BY MOUTH EVERY DAY    fluticasone-umeclidin-vilanter (Trelegy Ellipta) 200-62.5-25 mcg dsdv Take  by inhalation daily. predniSONE (STERAPRED) 5 mg dose pack Take  by mouth See Admin Instructions. See administration instruction per 5mg dose pack. Patient not sure of dosage. Cambia 50 mg pwpk TAKE CONTENTS OF 1 PACKET BY MOUTH EVERY DAY AS NEEDED FOR HEADACHE    ubrogepant (Ubrelvy) 50 mg tablet Take 1 tab at onset of severe headache; may repeat another in 2 hours if headaches persist    atogepant (Qulipta) 60 mg tab Take 60 mg by mouth daily. atogepant (Qulipta) 60 mg tab Take 60 mg by mouth daily. benzonatate (TESSALON) 200 mg capsule Take 1 Capsule by mouth three (3) times daily as needed for Cough. Allergy Relief, fexofenadine, 180 mg tablet TAKE 1 TABLET BY MOUTH EVERY DAY AS NEEDED    propranoloL (INDERAL) 10 mg tablet Take 1 Tab by mouth two (2) times a day. No current facility-administered medications for this visit. Past Medical History:   Diagnosis Date    Allergic rhinitis 6/14/2013    Asthma     Chronic insomnia 3/28/2017    GERD (gastroesophageal reflux disease)     Headache     Headache(784.0)     Hypercholesteremia 1/23/2012    Non morbid obesity 3/28/2017    Snoring     Vitamin D deficiency 10/20/2015        Past Surgical History:   Procedure Laterality Date    ENDOSCOPY, COLON, DIAGNOSTIC  12/03 & 04/09    HX GYN      Laproscopy, BTL,TVH    HX GYN      hysterectomy    HX HYSTERECTOMY      HX OTHER SURGICAL  11/22/2019    left knee repair torn meniscus        Family History   Problem Relation Age of Onset    Hypertension Father     Kidney Disease Maternal Grandmother     Hypertension Maternal Grandmother     Heart Attack Paternal Grandfather     Hypertension Mother     Cancer Maternal Grandfather         lung        Social History     Tobacco Use    Smoking status: Never    Smokeless tobacco: Never   Substance Use Topics    Alcohol use: Yes     Comment: Occasionally        All systems reviewed x 12 and were negative with the exception of None      No flowsheet data found. Vitals:  Ht 5' 5.5\" (1.664 m)   Wt 191 lb (86.6 kg)   BMI 31.30 kg/m²    Body mass index is 31.3 kg/m².     Physical Exam    General: NAD, well developed, well nourished, alert and oriented x 3. Cardiac: Extremities well perfused    Respiratory: Nonlabored breathing    LLE: Normal gait and station. Negative stinchfield. No effusion noted. No previous incisions noted. ROM 0-120 degrees. Grossly stable to varus/valgus stress and anterior/posterior drawer tests. Negative McMurrays. Motor grossly intact. RLE: Mild antalgic gait. Mild effusion noted. No previous incisions noted. ROM 0-120 degrees. Grossly stable to varus/valgus stress and anterior/posterior drawer tests. Discomfort with patellar motor grossly intact. Vascular: Palpable pedal pulses, equal bilaterally. Skin: Warm well perfused, cap refill < 2 sec. An electronic signature was used to authenticate this note.   -- Racheal Marsh MD

## 2022-11-14 DIAGNOSIS — M17.11 ARTHRITIS OF KNEE, RIGHT: Primary | ICD-10-CM

## 2022-11-14 RX ORDER — DICLOFENAC SODIUM 50 MG/1
50 TABLET, DELAYED RELEASE ORAL 2 TIMES DAILY WITH MEALS
Qty: 60 TABLET | Refills: 0 | Status: SHIPPED | OUTPATIENT
Start: 2022-11-14

## 2022-11-21 ENCOUNTER — OFFICE VISIT (OUTPATIENT)
Dept: ORTHOPEDIC SURGERY | Age: 52
End: 2022-11-21
Payer: COMMERCIAL

## 2022-11-21 DIAGNOSIS — M22.8X1 PATELLAR MALTRACKING, RIGHT: ICD-10-CM

## 2022-11-21 DIAGNOSIS — M17.11 ARTHRITIS OF KNEE, RIGHT: Primary | ICD-10-CM

## 2022-11-21 DIAGNOSIS — M25.561 CHRONIC PAIN OF RIGHT KNEE: ICD-10-CM

## 2022-11-21 DIAGNOSIS — G89.29 CHRONIC PAIN OF RIGHT KNEE: ICD-10-CM

## 2022-11-21 PROCEDURE — 97530 THERAPEUTIC ACTIVITIES: CPT | Performed by: PHYSICAL THERAPIST

## 2022-11-21 PROCEDURE — 97161 PT EVAL LOW COMPLEX 20 MIN: CPT | Performed by: PHYSICAL THERAPIST

## 2022-11-21 NOTE — PROGRESS NOTES
[facility]  [address]  [phone]  Physical Therapy Evaluation  Patient Name: Ney Antunez  NXBR:  : 1970  [x]  Patient  Verified  Nelwyn Schirmer, MD  Payor: David Prabhakar / Plan: [employer] HMO / Product Type: HMO /    Total Treatment Time (min): 40   Total Timed Codes (min): 40    Visit #: 1  Referring Provider: Nelwyn Schirmer, MD  Treatment Area: Right Knee    Subjective: The patient is a 47 y.o. female referred to physical therapy by   Nelwyn Schirmer, MD with a diagnosis of right knee arthritis, right knee pain, patella maltracking. The patient reports a progressive history of right knee pain exacerbated with functional activities including ascending and descending stairs, squatting, basic and advanced ADLs. 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 She is well-known to our clinic for treatment to the bilateral lower extremities. She did have a knee scope 2 to 3 years ago and she feels that her right knee pain is the result of increased functional use postoperatively. MRI does reveal grade 4 changes at the patellofemoral joint.   She reports she is interested loading functional activities to reduce her knee pain    Past Medical History:   Diagnosis Date    Allergic rhinitis 2013    Asthma     Chronic insomnia 3/28/2017    GERD (gastroesophageal reflux disease)     Headache     Headache(784.0)     Hypercholesteremia 2012    Non morbid obesity 3/28/2017    Snoring     Vitamin D deficiency 10/20/2015     Current Outpatient Medications   Medication Instructions    Allergy Relief, fexofenadine, 180 mg tablet TAKE 1 TABLET BY MOUTH EVERY DAY AS NEEDED    benzonatate (TESSALON) 200 mg, Oral, 3 TIMES DAILY AS NEEDED    Cambia 50 mg pwpk TAKE CONTENTS OF 1 PACKET BY MOUTH EVERY DAY AS NEEDED FOR HEADACHE    diclofenac EC (VOLTAREN) 50 mg, Oral, 2 TIMES DAILY WITH MEALS    fluticasone-umeclidin-vilanter (Trelegy Ellipta) 200-62.5-25 mcg dsdv Inhalation, DAILY    metaxalone (SKELAXIN) 800 mg tablet TAKE 1 TABLET BY MOUTH EVERY DAY AT NIGHT    methylPREDNISolone (MEDROL DOSEPACK) 4 mg tablet Per dose pack instructions    methylPREDNISolone (Medrol, Jose Antonio,) 4 mg tablet Use as directed    Monovisc 88 mg/4 mL syrg injection Inject 1 prefilled syringe into each knee    predniSONE (STERAPRED) 5 mg dose pack Oral, SEE ADMIN INSTRUCTIONS, See administration instruction per 5mg dose pack. Patient not sure of dosage. propranoloL (INDERAL) 10 mg, Oral, 2 TIMES DAILY    Qulipta 60 mg, Oral, DAILY    Qulipta 60 mg, Oral, DAILY    Trokendi  mg capsule TAKE 1 CAPSULE BY MOUTH EVERY DAY    Trokendi XR 50 mg capsule TAKE 1 CAPSULE BY MOUTH EVERY DAY IN ADDITION  MG TO TOTAL 150 MG DAILY    ubrogepant (Ubrelvy) 50 mg tablet Take 1 tab at onset of severe headache; may repeat another in 2 hours if headaches persist     Past Surgical History:   Procedure Laterality Date    ENDOSCOPY, COLON, DIAGNOSTIC  12/03 & 04/09    HX GYN      Laproscopy, BTL,TVH    HX GYN      hysterectomy    HX HYSTERECTOMY      HX OTHER SURGICAL  11/22/2019    left knee repair torn meniscus      Social Connections: Not on file     BMI:31.30 kg/m²    Objective:      Gait: Antalgic gait on the involved lower extremity r she has increased hip external rotation no ambulatory aid needed  Balance: Single-leg stance on involved lower extremity at   3 seconds. She has clear abduction internal rotation of the femur and single-leg stance    Range of motion: 0-5- 115 degrees    Strength: Quadriceps: 3+ /5 :  Hip abduction: 3- / 5 : HIP Flexion: 3 /5 she is unable to maintain a single-leg bridge on the right lower extremity    Soft tissue: restriction is noted of the quadricep, rectus, hamstrings and IT band musculature.        Pain: Reported a visual analog scale 2-7/10 activity dependent    Swelling: Patient has no significant increased shirley-patellar effusion /swelling. No bruising is noted . Joint mobility assessment: Reduction in tibiofemoral and patellofemoral mobility graded at 2+/6 Kaltenborn scale. Lower Extremity Functional Scale see chart 26 /80    Neurologic screening reveals no focal deficits. Special tests:   Negative Homans sign. Q angles above 20 degrees. Stepdown test reveals abduction internal rotation. TREATMENT:  Physical therapy evaluation: 20 minutes  Therapeutic activity instruction 20 minutes  1. Supine straight leg raise  2. Short arc quads  3. Side-lying hip AB duction  4. Bridges single-leg bilateral leg  5. Bridges with band  6. Clamshells side-lying  7. Hamstring stretch  8. Calf stretch  9 hip flexor standing quad stretch    Assessment:  Patient presents today with right knee pain. She has demonstrated chondral changes in the patella and trochlea on the right lower extremity in conjunction with this she has clear lateral track has increased Q angle as well as elevated BMI leading to functional-based knee pain. She is a fair candidate to progress with an aggressive home exercise program to address hip and core strength in conjunction with lower extremity flexibility and single-leg control. 1. Arthritis of knee, right  2. Chronic pain of right knee  3. Patellar maltracking, right      GOALS:     Independent demonstration of home exercise program and 3 visits    Short-term goals 3 weeks. 1.  Independent demonstration of a home exercise program.    2.  Patient will have pain reduction by 50%. 3.  Single leg stance at 8 seconds       Long-term goals 8 weeks. 1.  Community Ambulator(30min) with nonantalgic gait pattern without need for assistive device  2. Ascend and descend 1 flight of stairs without need for handrail or assistive device  3.   The patient will demonstrate knee range of motion 0-0-120 degrees    Physical Therapy Plan of Care    Treatment frequency: 1X per week  Treatment duration: 12 visits     Focus of therapy will be on progressive restoration of range of motion, strength, balance and functional mobility. Therapeutic applications will include but are not limited to:  Home exercise program development and instruction with updating as needed. Manual therapy, joint mobilization, myofascial release, therapeutic exercises for restoration of range of motion to the osteo-kinematic and arthrokinematic limitations. Modalities including ultrasound, electrical stimulation, vasopneumatic compression, heat and ice. Kinesiotaping for joint  for neuromuscular reeducation to reduce pain and and swelling and improve pain around the involved region. Neuromuscular reeducation. Hold relax stretching techniques improve the patient's neuromuscular motor firing pattern of the hip musculature including hip flexors extensors quadriceps hamstrings and rotators  PNF    Postural positional activities for improved joint control and position including NDT techniques      Yadiel Vora, PT    11/21/2022      The referring physician has reviewed and approved this evaluation and plan of care as noted by the electronic signature attached to note.

## 2022-11-30 ENCOUNTER — HOSPITAL ENCOUNTER (OUTPATIENT)
Dept: MAMMOGRAPHY | Age: 52
Discharge: HOME | End: 2022-11-30
Payer: COMMERCIAL

## 2022-11-30 ENCOUNTER — TRANSCRIBE ORDER (OUTPATIENT)
Dept: REGISTRATION | Age: 52
End: 2022-11-30

## 2022-11-30 DIAGNOSIS — Z12.31 VISIT FOR SCREENING MAMMOGRAM: Primary | ICD-10-CM

## 2022-11-30 DIAGNOSIS — Z12.31 VISIT FOR SCREENING MAMMOGRAM: ICD-10-CM

## 2022-11-30 PROCEDURE — 77063 BREAST TOMOSYNTHESIS BI: CPT

## 2022-12-07 ENCOUNTER — OFFICE VISIT (OUTPATIENT)
Dept: ORTHOPEDIC SURGERY | Age: 52
End: 2022-12-07
Payer: COMMERCIAL

## 2022-12-07 DIAGNOSIS — M17.11 ARTHRITIS OF KNEE, RIGHT: Primary | ICD-10-CM

## 2022-12-07 DIAGNOSIS — M25.561 CHRONIC PAIN OF RIGHT KNEE: ICD-10-CM

## 2022-12-07 DIAGNOSIS — M22.8X1 PATELLAR MALTRACKING, RIGHT: ICD-10-CM

## 2022-12-07 DIAGNOSIS — G89.29 CHRONIC PAIN OF RIGHT KNEE: ICD-10-CM

## 2022-12-07 NOTE — PROGRESS NOTES
PT DAILY Progress Note    Patient Name: Agustin Deutsch  2022  : 1970  [x]  Patient  Verified  Payor: Michael eWir / Plan: Gregor Ahuja Se HMO / Product Type: HMO /    Total Treatment Time (min): 45  Total Timed Codes (min): 45    Referring Physician:   Orestes Velarde MD         1. Arthritis of knee, right  2. Chronic pain of right knee  3. Patellar maltracking, right    SUBJECTIVE  Subjective functional status/changes:   [] No changes reported    Patient has a discussion about doing Pilates. We had a long discussion about functional core and hip strength as the long-term goal of therapy. OBJECTIVE/TREATMENT    Neuromuscular Re-education x 20 minutes:  []  Kinesiotaping for   []  Neuromuscular reeducation to the VMO with use of [place] electrical stimulation in conjunction with active contraction and exercises.    [x] Neuromuscular and proprioceptive exercises and activities in clinic as listed below in bold      Therapeutic Exercise x 25 mins:   Strengthening/Endurance/    EXERCISE X= completed   Straight leg raise 2 pounds x   Short arc quads 2 pounds x   Side-lying hip abduction 2 pounds x   Hamstring stretch x   Prone quad stretch x   NuStep knee ankle 0-30 X                       Neuromuscular Re-education     Clamshells 3 part green X   Bridge with band AB duction green X   Single-leg bridge X   To leg bridge no band X   Supine plank on foam roller X               Added/Changed Exercises:  []  Advanced to address: [x] functional strength/ROM deficits [x] balance/proprioceptive tasks  []  Modified: [] per subjective reports [] for patient time constraints [] for clinic time constraints      Patient Education: [x] Review HEP    [] Progressed/Changed HEP based on:  [] positioning   [] body mechanics   [] transfers   [] heat/ice application        ASSESSMENT  []  See Plan of Care  []  See progress note/recertification  [x]  Patient will continue to benefit from skilled therapy to address remaining functional deficits:     Patient tolerated treatment well she is quite fatigued with progressive exercises she needs verbal and tactile cueing for 100% accuracy. Progress towards goals / Updated goals:    PLAN  [x]  Upgrade activities as tolerated      [x]  Continue plan of care  []  Discharge due to:_  [] Other:_      Return in about 5 days (around 12/12/2022) for [address] [place] [address] physical therapy.      Mitchell Corona, PT 12/8/2022

## 2022-12-14 ENCOUNTER — OFFICE VISIT (OUTPATIENT)
Dept: ORTHOPEDIC SURGERY | Age: 52
End: 2022-12-14
Payer: COMMERCIAL

## 2022-12-14 DIAGNOSIS — M17.0 BILATERAL PRIMARY OSTEOARTHRITIS OF KNEE: ICD-10-CM

## 2022-12-14 DIAGNOSIS — G89.29 CHRONIC PAIN OF RIGHT KNEE: ICD-10-CM

## 2022-12-14 DIAGNOSIS — M22.41 CHONDROMALACIA OF BOTH PATELLAE: ICD-10-CM

## 2022-12-14 DIAGNOSIS — M17.11 ARTHRITIS OF KNEE, RIGHT: Primary | ICD-10-CM

## 2022-12-14 DIAGNOSIS — M22.42 CHONDROMALACIA OF BOTH PATELLAE: ICD-10-CM

## 2022-12-14 DIAGNOSIS — M25.561 CHRONIC PAIN OF RIGHT KNEE: ICD-10-CM

## 2022-12-14 DIAGNOSIS — M22.8X1 PATELLAR MALTRACKING, RIGHT: ICD-10-CM

## 2022-12-14 NOTE — PROGRESS NOTES
PT DAILY Progress Note    Patient Name: Galina Mow  2022  : 1970  [x]  Patient  Verified  Payor: Lena Perez / Plan: [address] HMO / Product Type: HMO /    Total Treatment Time (min): 55  Total Timed Codes (min): 55    Referring Physician:   Jaimie Yun MD         1. Arthritis of knee, right  2. Chronic pain of right knee  3. Patellar maltracking, right  4. Bilateral primary osteoarthritis of knee  5. Chondromalacia of both patellae    SUBJECTIVE  Subjective functional status/changes:   [] No changes reported    Patient has a discussion about doing Pilates. We had a long discussion about functional core and hip strength as the long-term goal of therapy. OBJECTIVE/TREATMENT    Neuromuscular Re-education x 30 minutes:  []  Kinesiotaping for   []  Neuromuscular reeducation to the VMO with use of [place] electrical stimulation in conjunction with active contraction and exercises.    [x] Neuromuscular and proprioceptive exercises and activities in clinic as listed below in bold      Therapeutic Exercise x 25 mins:   Strengthening/Endurance/    EXERCISE X= completed   Straight leg raise 2 pounds x   Short arc quads 2 pounds x   Side-lying hip abduction 2 pounds x   Hamstring stretch x   Prone quad stretch x   NuStep knee ankle 0-30 X   Short arc quads                    Neuromuscular Re-education     Clamshells 3 part green X   Bridge with band AB duction green X   Single-leg bridge X   To leg bridge no band X   Supine plank on foam roller X   Total Gym reformer\on Total Gym squat jump eccentric focus level 6 X   Standing hip extension utilizing reformer x   Retrobungee                Added/Changed Exercises:  []  Advanced to address: [x] functional strength/ROM deficits [x] balance/proprioceptive tasks  []  Modified: [] per subjective reports [] for patient time constraints [] for clinic time constraints      Patient Education: [x] Review HEP    [] Progressed/Changed HEP based on:  [] positioning   [] body mechanics   [] transfers   [] heat/ice application        ASSESSMENT  []  See Plan of Care  []  See progress note/recertification  [x]  Patient will continue to benefit from skilled therapy to address remaining functional deficits:     Patient tolerated the new treatment well. She is going to start some Pilates instruction we did go over some of the activities today utilizing our modified reformer. She tolerated these activities well continues to need core and functional quad and hip strength to control patellofemoral position    Progress towards goals / Updated goals:    PLAN  [x]  Upgrade activities as tolerated      [x]  Continue plan of care  []  Discharge due to:_  [] Other:_      Return in about 1 week (around 12/21/2022) for [id number] [place] [address] physical therapy.      Dharmesh Delacruz, PT 12/14/2022

## 2022-12-28 ENCOUNTER — OFFICE VISIT (OUTPATIENT)
Dept: ORTHOPEDIC SURGERY | Age: 52
End: 2022-12-28
Payer: COMMERCIAL

## 2022-12-28 DIAGNOSIS — M17.0 BILATERAL PRIMARY OSTEOARTHRITIS OF KNEE: ICD-10-CM

## 2022-12-28 DIAGNOSIS — M22.8X1 PATELLAR MALTRACKING, RIGHT: ICD-10-CM

## 2022-12-28 DIAGNOSIS — G89.29 CHRONIC PAIN OF RIGHT KNEE: ICD-10-CM

## 2022-12-28 DIAGNOSIS — M25.561 CHRONIC PAIN OF RIGHT KNEE: ICD-10-CM

## 2022-12-28 DIAGNOSIS — M17.11 ARTHRITIS OF KNEE, RIGHT: Primary | ICD-10-CM

## 2022-12-28 NOTE — PROGRESS NOTES
PT DAILY Progress Note    Patient Name: Luciano Hines  2022  : 1970  [x]  Patient  Verified  Payor: Izabela Yin / Plan: Gregor Ahuja Se HMO / Product Type: HMO /    Total Treatment Time (min): 55  Total Timed Codes (min): 55    Referring Physician:   Carole Rosales MD         1. Arthritis of knee, right  2. Chronic pain of right knee  3. Patellar maltracking, right  4. Bilateral primary osteoarthritis of knee    SUBJECTIVE  Subjective functional status/changes:   [] No changes reported    Patient reports she has been less compliant. She continues to look at Pilates as an tentative to a gym program.  OBJECTIVE/TREATMENT    Neuromuscular Re-education x 30 minutes:  []  Kinesiotaping for   []  Neuromuscular reeducation to the VMO with use of [place] electrical stimulation in conjunction with active contraction and exercises.    [x] Neuromuscular and proprioceptive exercises and activities in clinic as listed below in bold      Therapeutic Exercise x 25 mins:   Strengthening/Endurance/    EXERCISE X= completed   Straight leg raise 2 pounds x   Short arc quads 2 pounds x   Side-lying hip abduction 2 pounds x   Hamstring stretch x   Prone quad stretch x   NuStep knee ankle 0-30 X   Short arc quads                    Neuromuscular Re-education     Clamshells 3 part green X   Bridge with band AB duction green X   Single-leg bridge X   To leg bridge no band X   Supine plank on foam roller X   Total Gym reformer\on Total Gym squat jump eccentric focus level 6 X   Standing hip extension utilizing reformer x   Retrobungee                Added/Changed Exercises:  []  Advanced to address: [x] functional strength/ROM deficits [x] balance/proprioceptive tasks  []  Modified: [] per subjective reports [] for patient time constraints [] for clinic time constraints      Patient Education: [x] Review HEP    [] Progressed/Changed HEP based on:  [] positioning   [] body mechanics   [] transfers   [] heat/ice application        ASSESSMENT  []  See Plan of Care  []  See progress note/recertification  [x]  Patient will continue to benefit from skilled therapy to address remaining functional deficits: Will continue to progress functional home activity program focusing on hip abduction strength and functional movement patterns to reduce patellofemoral stress. We did advance activities today in the clinic to continue to address this. We will follow her again next week    Progress towards goals / Updated goals:    PLAN  [x]  Upgrade activities as tolerated      [x]  Continue plan of care  []  Discharge due to:_  [] Other:_      Return in about 1 week (around 1/4/2023).      Corinne Allen, PT 12/28/2022

## 2023-01-03 ENCOUNTER — OFFICE VISIT (OUTPATIENT)
Dept: URGENT CARE | Age: 53
End: 2023-01-03
Payer: COMMERCIAL

## 2023-01-03 VITALS
BODY MASS INDEX: 31.3 KG/M2 | DIASTOLIC BLOOD PRESSURE: 85 MMHG | HEART RATE: 95 BPM | SYSTOLIC BLOOD PRESSURE: 129 MMHG | WEIGHT: 191 LBS | TEMPERATURE: 98.7 F | OXYGEN SATURATION: 96 % | RESPIRATION RATE: 16 BRPM

## 2023-01-03 DIAGNOSIS — J45.31 MILD PERSISTENT ASTHMA WITH EXACERBATION: ICD-10-CM

## 2023-01-03 DIAGNOSIS — Z11.59 SCREENING FOR VIRAL DISEASE: ICD-10-CM

## 2023-01-03 DIAGNOSIS — J02.9 SORE THROAT: Primary | ICD-10-CM

## 2023-01-03 DIAGNOSIS — R05.1 ACUTE COUGH: ICD-10-CM

## 2023-01-03 LAB
FLUAV+FLUBV AG NOSE QL IA.RAPID: NEGATIVE
FLUAV+FLUBV AG NOSE QL IA.RAPID: NEGATIVE
S PYO AG THROAT QL: NEGATIVE
SARS-COV-2 PCR, POC: NEGATIVE
VALID INTERNAL CONTROL?: YES
VALID INTERNAL CONTROL?: YES

## 2023-01-03 RX ORDER — ALBUTEROL SULFATE 0.83 MG/ML
2.5 SOLUTION RESPIRATORY (INHALATION)
Qty: 45 EACH | Refills: 0 | Status: SHIPPED | OUTPATIENT
Start: 2023-01-03

## 2023-01-03 RX ORDER — PREDNISONE 10 MG/1
TABLET ORAL
Qty: 21 TABLET | Refills: 0 | Status: SHIPPED | OUTPATIENT
Start: 2023-01-04

## 2023-01-03 RX ORDER — BENZONATATE 200 MG/1
200 CAPSULE ORAL
Qty: 30 CAPSULE | Refills: 0 | Status: SHIPPED | OUTPATIENT
Start: 2023-01-03

## 2023-01-03 RX ORDER — PREDNISONE 20 MG/1
60 TABLET ORAL
Qty: 3 TABLET | Refills: 0 | Status: SHIPPED | COMMUNITY
Start: 2023-01-03 | End: 2023-01-03

## 2023-01-03 RX ORDER — ALBUTEROL SULFATE 90 UG/1
2 AEROSOL, METERED RESPIRATORY (INHALATION)
Qty: 1 EACH | Refills: 0 | Status: SHIPPED | OUTPATIENT
Start: 2023-01-03

## 2023-01-03 NOTE — PATIENT INSTRUCTIONS
You will see your results on [employer] within an hour; we will call with positive results

## 2023-01-04 NOTE — PROGRESS NOTES
German Robledo is a 47 y.o. female who presents with ST, cough, chest tightness since yesterday. Has asthma, using albuterol with temporary relief. Denies fever, N/V/D. The history is provided by the patient.       Past Medical History:   Diagnosis Date    Allergic rhinitis 6/14/2013    Asthma     Chronic insomnia 3/28/2017    GERD (gastroesophageal reflux disease)     Headache     Headache(784.0)     Hypercholesteremia 1/23/2012    Non morbid obesity 3/28/2017    Snoring     Vitamin D deficiency 10/20/2015        Past Surgical History:   Procedure Laterality Date    ENDOSCOPY, COLON, DIAGNOSTIC  12/03 & 04/09    HX GYN      Laproscopy, BTL,TVH    HX GYN      hysterectomy    HX HYSTERECTOMY      HX OTHER SURGICAL  11/22/2019    left knee repair torn meniscus          Family History   Problem Relation Age of Onset    Hypertension Father     Kidney Disease Maternal Grandmother     Hypertension Maternal Grandmother     Heart Attack Paternal Grandfather     Hypertension Mother     Cancer Maternal Grandfather         lung        Social History     Socioeconomic History    Marital status:      Spouse name: Not on file    Number of children: Not on file    Years of education: Not on file    Highest education level: Not on file   Occupational History    Not on file   Tobacco Use    Smoking status: Never    Smokeless tobacco: Never   Vaping Use    Vaping Use: Never used   Substance and Sexual Activity    Alcohol use: Yes     Comment: Occasionally    Drug use: No    Sexual activity: Yes     Partners: Male     Birth control/protection: None   Other Topics Concern    Not on file   Social History Narrative    Not on file     Social Determinants of Health     Financial Resource Strain: Not on file   Food Insecurity: Not on file   Transportation Needs: Not on file   Physical Activity: Not on file   Stress: Not on file   Social Connections: Not on file   Intimate Partner Violence: Not on file   Housing Stability: Not on file                ALLERGIES: Latex and Aspirin    Review of Systems   Constitutional:  Negative for activity change, appetite change and fever. HENT:  Positive for congestion and sore throat. Respiratory:  Positive for cough, chest tightness and shortness of breath. Gastrointestinal:  Negative for diarrhea, nausea and vomiting. Vitals:    01/03/23 1810 01/03/23 1813 01/03/23 1816   BP:  (!) 159/94 129/85   Pulse:  95    Resp:  16    Temp:  98.7 °F (37.1 °C)    SpO2:  96%    Weight: 191 lb (86.6 kg)         Physical Exam  Vitals and nursing note reviewed. Constitutional:       General: She is not in acute distress. Appearance: She is well-developed. She is not diaphoretic. HENT:      Right Ear: Tympanic membrane, ear canal and external ear normal.      Left Ear: Tympanic membrane, ear canal and external ear normal.      Nose: Congestion present. Right Sinus: No maxillary sinus tenderness or frontal sinus tenderness. Left Sinus: No maxillary sinus tenderness or frontal sinus tenderness. Mouth/Throat:      Pharynx: Posterior oropharyngeal erythema present. No oropharyngeal exudate. Tonsils: No tonsillar abscesses. Cardiovascular:      Rate and Rhythm: Normal rate and regular rhythm. Heart sounds: Normal heart sounds. Pulmonary:      Effort: Pulmonary effort is normal. No respiratory distress. Breath sounds: Normal breath sounds. No stridor. No wheezing, rhonchi or rales. Lymphadenopathy:      Cervical: No cervical adenopathy. Neurological:      Mental Status: She is alert. Psychiatric:         Behavior: Behavior normal.         Thought Content: Thought content normal.         Judgment: Judgment normal.       MDM    ICD-10-CM ICD-9-CM   1. Sore throat  J02.9 462   2. Acute cough  R05.1 786.2   3. Mild persistent asthma with exacerbation  J45.31 493.92   4.  Screening for viral disease  Z11.59 V73.99       Orders Placed This Encounter    AMB POC RAPID STREP A    AMB POC LULI INFLUENZA A/B TEST    POCT COVID-19, SARS-COV-2, PCR     Order Specific Question:   Is this test for diagnosis or screening? Answer:   Diagnosis of ill patient     Order Specific Question:   Symptomatic for COVID-19 as defined by CDC? Answer:   Yes     Order Specific Question:   Date of Symptom Onset     Answer:   1/2/2023     Order Specific Question:   Hospitalized for COVID-19? Answer:   No     Order Specific Question:   Admitted to ICU for COVID-19? Answer:   No     Order Specific Question:   Employed in healthcare setting? Answer:   Unknown     Order Specific Question:   Resident in a congregate (group) care setting? Answer:   Unknown     Order Specific Question:   Pregnant? Answer:   No     Order Specific Question:   Previously tested for COVID-19? Answer:   Yes    predniSONE (DELTASONE) 20 mg tablet     Sig: Take 3 Tablets by mouth now for 1 dose. Dispense:  3 Tablet     Refill:  0     Order Specific Question:   Expiration Date     Answer:   10/31/2023     Order Specific Question:   Lot#     Answer:   [id number]     Order Specific Question:        Answer:   Rossi Cárdenas     Order Specific Question:   NDC#     Answer:   6933-7932-17    predniSONE (STERAPRED DS) 10 mg dose pack     Sig: Take as directed for 6 days, with food     Dispense:  21 Tablet     Refill:  0    albuterol (PROVENTIL HFA, VENTOLIN HFA, PROAIR HFA) 90 mcg/actuation inhaler     Sig: Take 2 Puffs by inhalation every six (6) hours as needed for Wheezing. Dispense:  1 Each     Refill:  0    albuterol (PROVENTIL VENTOLIN) 2.5 mg /3 mL (0.083 %) nebu     Sig: 3 mL by Nebulization route every six (6) hours as needed for Wheezing. Dispense:  45 Each     Refill:  0    benzonatate (TESSALON) 200 mg capsule     Sig: Take 1 Capsule by mouth three (3) times daily as needed for Cough.      Dispense:  30 Capsule     Refill:  0      Given prednisone 60mg   Start pred pack tomorrow  Albuterol, tessalon as directed  The patient is to follow up with PCP [facility]. If signs and symptoms become worse the pt is to go to the ER.      Results for orders placed or performed in visit on 01/03/23   AMB POC RAPID STREP A   Result Value Ref Range    VALID INTERNAL CONTROL POC Yes     Group A Strep Ag Negative Negative   AMB POC LULI INFLUENZA A/B TEST   Result Value Ref Range    VALID INTERNAL CONTROL POC Yes     Influenza A Ag POC Negative Negative    Influenza B Ag POC Negative Negative   POCT COVID-19, SARS-COV-2, PCR   Result Value Ref Range    SARS-COV-2 PCR, POC Negative Negative         Procedures

## 2023-01-12 ENCOUNTER — NURSE TRIAGE (OUTPATIENT)
Dept: OTHER | Facility: CLINIC | Age: 53
End: 2023-01-12

## 2023-01-12 NOTE — TELEPHONE ENCOUNTER
Location of patient: [address] [facility] with [employer]. Subjective: Caller states \"It's been going on for a couple of weeks. I am just very tired and fatigue. I work a lot but sometimes I just have to shut my eyes and go to sleep. I've gained some weight too. I've had two asthma attacks in the last week. I just need a work up to get everything checked. \"    Current Symptoms: fatigue    Onset: 2 weeks ago; gradual    Associated Symptoms:  NA    Pain Severity: denies pain    Temperature: denies fever     What has been tried: NA    LMP: NA Pregnant: NA    Recommended disposition: See PCP within 3 Days    Care advice provided, patient verbalizes understanding; denies any other questions or concerns; instructed to call back for any new or worsening symptoms. Patient/Caller agrees with recommended disposition; writer provided warm transfer to [employer] at [facility] for appointment scheduling    Attention Provider: Thank you for allowing me to participate in the care of your patient. The patient was connected to triage in response to information provided to the [facility]. Please do not respond through this encounter as the response is not directed to a shared pool.     Reason for Disposition   MILD weakness (i.e., does not interfere with ability to work, go to school, normal activities) and persists > 1 week    Protocols used: Weakness (Generalized) and Fatigue-ADULT-[place]

## 2023-01-16 ENCOUNTER — OFFICE VISIT (OUTPATIENT)
Dept: FAMILY MEDICINE CLINIC | Age: 53
End: 2023-01-16
Payer: COMMERCIAL

## 2023-01-16 VITALS
DIASTOLIC BLOOD PRESSURE: 89 MMHG | HEART RATE: 87 BPM | HEIGHT: 66 IN | SYSTOLIC BLOOD PRESSURE: 130 MMHG | BODY MASS INDEX: 33.43 KG/M2 | TEMPERATURE: 97.8 F | WEIGHT: 208 LBS | OXYGEN SATURATION: 98 %

## 2023-01-16 DIAGNOSIS — R35.0 URINARY FREQUENCY: ICD-10-CM

## 2023-01-16 DIAGNOSIS — R53.83 FATIGUE, UNSPECIFIED TYPE: Primary | ICD-10-CM

## 2023-01-16 DIAGNOSIS — R45.89 DEPRESSED MOOD: ICD-10-CM

## 2023-01-16 DIAGNOSIS — F51.04 CHRONIC INSOMNIA: ICD-10-CM

## 2023-01-16 PROCEDURE — 99214 OFFICE O/P EST MOD 30 MIN: CPT | Performed by: FAMILY MEDICINE

## 2023-01-16 RX ORDER — BUTALBITAL, ACETAMINOPHEN AND CAFFEINE 50; 325; 40 MG/1; MG/1; MG/1
TABLET ORAL
COMMUNITY
Start: 2022-12-05

## 2023-01-16 RX ORDER — BUPROPION HYDROCHLORIDE 150 MG/1
150 TABLET ORAL DAILY
Qty: 30 TABLET | Refills: 0 | Status: SHIPPED | OUTPATIENT
Start: 2023-01-16

## 2023-01-16 NOTE — PROGRESS NOTES
HISTORY OF PRESENT ILLNESS  Natalya Bethea is a 47 y.o. female. Patient presents with:  Fatigue    This started two months ago. 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 Evidently, she added Eris Mcdaniel to her jobs when this started. Her chronic insomnia is worse and she is only getting 4 hours of sleep a night. She gets up to urinate to 3 or 4 times a night. She is seeing urology for this and was told everything was OK. He gave her pills for this but she does not take them at night. Review of Systems   Constitutional:  Positive for malaise/fatigue. Negative for weight loss. HENT:  Negative for congestion. Eyes:  Negative for blurred vision. Respiratory:  Negative for cough, shortness of breath and wheezing. Cardiovascular:  Negative for chest pain. Gastrointestinal:  Negative for abdominal pain, blood in stool, diarrhea, melena, nausea and vomiting. Genitourinary:  Positive for frequency, hematuria and urgency. Negative for dysuria. Polyuria   Neurological:  Negative for dizziness. Endo/Heme/Allergies:  Negative for polydipsia. Psychiatric/Behavioral:  Positive for depression. Negative for hallucinations, substance abuse and suicidal ideas. The patient has insomnia. The patient is not nervous/anxious. Visit Vitals  /89 (BP 1 Location: Left upper arm, BP Patient Position: Sitting, BP Cuff Size: Large adult)   Pulse 87   Temp 97.8 °F (36.6 °C) (Temporal)   Ht 5' 5.5\" (1.664 m)   Wt 208 lb (94.3 kg)   SpO2 98%   BMI 34.09 kg/m²     Physical Exam  Vitals and nursing note reviewed. Constitutional:       General: She is not in acute distress. Appearance: Normal appearance. She is well-developed. She is not diaphoretic. Neck:      Thyroid: No thyroid mass, thyromegaly or thyroid tenderness. Cardiovascular:      Rate and Rhythm: Normal rate and regular rhythm. Heart sounds: Normal heart sounds. No murmur heard. No friction rub. No gallop.    Pulmonary: Effort: Pulmonary effort is normal. No respiratory distress. Breath sounds: Normal breath sounds. No wheezing or rales. Abdominal:      General: Bowel sounds are normal. There is no distension. Palpations: Abdomen is soft. There is no mass. Tenderness: There is no abdominal tenderness. There is no guarding or rebound. Lymphadenopathy:      Cervical: No cervical adenopathy. Skin:     General: Skin is warm and dry. Neurological:      Mental Status: She is alert and oriented to person, place, and time. ASSESSMENT and PLAN    ICD-10-CM ICD-9-CM    1. Fatigue, unspecified type  M18.65 127.60 METABOLIC PANEL, COMPREHENSIVE      TSH 3RD GENERATION      CBC WITH AUTOMATED DIFF      METABOLIC PANEL, COMPREHENSIVE      TSH 3RD GENERATION      CBC WITH AUTOMATED DIFF      2. Chronic insomnia  F51.04 780.52       3. Depressed mood  R45.89 799.29 buPROPion XL (Wellbutrin XL) 150 mg tablet      4. Urinary frequency  R35.0 788.41           Fatigue mostly due to lack of sleep, chronic insomnia, depressed mood and chronic urinary frequency  Labs per orders. Start Wellbutrin  Sleep hygiene  Take urinary medication piror to bed    Follow-up and Dispositions    Return in about 3 weeks (around 2/6/2023) for fatigue, depressed mood. Reviewed plan of care. Patient has provided input and agrees with goals.

## 2023-01-16 NOTE — PROGRESS NOTES
Identified pt with two pt identifiers. Reviewed record in preparation for visit and have obtained necessary documentation. All patient medications has been reviewed. Chief Complaint   Patient presents with    Fatigue     Additional information about chief complaint:    Visit Vitals  /89 (BP 1 Location: Left upper arm, BP Patient Position: Sitting, BP Cuff Size: Large adult)   Pulse 87   Temp 97.8 °F (36.6 °C) (Temporal)   Ht 5' 5.5\" (1.664 m)   Wt 208 lb (94.3 kg)   SpO2 98%   BMI 34.09 kg/m²       Health Maintenance Due   Topic    Pneumococcal 0-64 years (1 - PCV)    Colorectal Cancer Screening Combo     COVID-19 Vaccine (3 - Booster for Pfizer series)    Shingles Vaccine (1 of 2)    Flu Vaccine (1)    Depression Screen        1. Have you been to the ER, urgent care clinic since your last visit? Hospitalized since your last visit? no    2. Have you seen or consulted any other health care providers outside of the [address] [place] [address] since your last visit? Include any pap smears or colon screening. Pt has been seeing and ortho doctor for arthritis in b/l knees.

## 2023-01-17 LAB
ALBUMIN SERPL-MCNC: 4.4 G/DL (ref 3.8–4.9)
ALBUMIN/GLOB SERPL: 1.8 {RATIO} (ref 1.2–2.2)
ALP SERPL-CCNC: 75 IU/L (ref 44–121)
ALT SERPL-CCNC: 17 IU/L (ref 0–32)
AST SERPL-CCNC: 15 IU/L (ref 0–40)
BASOPHILS # BLD AUTO: 0.1 X10E3/UL (ref 0–0.2)
BASOPHILS NFR BLD AUTO: 1 %
BILIRUB SERPL-MCNC: 0.2 MG/DL (ref 0–1.2)
BUN SERPL-MCNC: 15 MG/DL (ref 6–24)
BUN/CREAT SERPL: 15 (ref 9–23)
CALCIUM SERPL-MCNC: 9.5 MG/DL (ref 8.7–10.2)
CHLORIDE SERPL-SCNC: 104 MMOL/L (ref 96–106)
CO2 SERPL-SCNC: 25 MMOL/L (ref 20–29)
CREAT SERPL-MCNC: 1.02 MG/DL (ref 0.57–1)
EGFR: 67 ML/MIN/1.73
EOSINOPHIL # BLD AUTO: 0.4 X10E3/UL (ref 0–0.4)
EOSINOPHIL NFR BLD AUTO: 3 %
ERYTHROCYTE [DISTWIDTH] IN BLOOD BY AUTOMATED COUNT: 13.6 % (ref 11.7–15.4)
GLOBULIN SER CALC-MCNC: 2.4 G/DL (ref 1.5–4.5)
GLUCOSE SERPL-MCNC: 88 MG/DL (ref 70–99)
HCT VFR BLD AUTO: 43.7 % (ref 34–46.6)
HGB BLD-MCNC: 14.3 G/DL (ref 11.1–15.9)
IMM GRANULOCYTES # BLD AUTO: 0.2 X10E3/UL (ref 0–0.1)
IMM GRANULOCYTES NFR BLD AUTO: 2 %
LYMPHOCYTES # BLD AUTO: 2.7 X10E3/UL (ref 0.7–3.1)
LYMPHOCYTES NFR BLD AUTO: 23 %
MCH RBC QN AUTO: 29.2 PG (ref 26.6–33)
MCHC RBC AUTO-ENTMCNC: 32.7 G/DL (ref 31.5–35.7)
MCV RBC AUTO: 89 FL (ref 79–97)
MONOCYTES # BLD AUTO: 1.1 X10E3/UL (ref 0.1–0.9)
MONOCYTES NFR BLD AUTO: 9 %
NEUTROPHILS # BLD AUTO: 7.1 X10E3/UL (ref 1.4–7)
NEUTROPHILS NFR BLD AUTO: 62 %
PLATELET # BLD AUTO: 384 X10E3/UL (ref 150–450)
POTASSIUM SERPL-SCNC: 4.5 MMOL/L (ref 3.5–5.2)
PROT SERPL-MCNC: 6.8 G/DL (ref 6–8.5)
RBC # BLD AUTO: 4.9 X10E6/UL (ref 3.77–5.28)
SODIUM SERPL-SCNC: 143 MMOL/L (ref 134–144)
TSH SERPL DL<=0.005 MIU/L-ACNC: 1.6 UIU/ML (ref 0.45–4.5)
WBC # BLD AUTO: 11.5 X10E3/UL (ref 3.4–10.8)

## 2023-02-07 DIAGNOSIS — R45.89 DEPRESSED MOOD: ICD-10-CM

## 2023-02-10 ENCOUNTER — APPOINTMENT (OUTPATIENT)
Dept: VASCULAR SURGERY | Age: 53
End: 2023-02-10
Attending: STUDENT IN AN ORGANIZED HEALTH CARE EDUCATION/TRAINING PROGRAM
Payer: COMMERCIAL

## 2023-02-10 ENCOUNTER — HOSPITAL ENCOUNTER (EMERGENCY)
Age: 53
Discharge: HOME | End: 2023-02-10
Attending: STUDENT IN AN ORGANIZED HEALTH CARE EDUCATION/TRAINING PROGRAM
Payer: COMMERCIAL

## 2023-02-10 VITALS
RESPIRATION RATE: 16 BRPM | OXYGEN SATURATION: 99 % | HEART RATE: 75 BPM | SYSTOLIC BLOOD PRESSURE: 133 MMHG | TEMPERATURE: 98 F | DIASTOLIC BLOOD PRESSURE: 79 MMHG

## 2023-02-10 DIAGNOSIS — R06.02 SOB (SHORTNESS OF BREATH): ICD-10-CM

## 2023-02-10 DIAGNOSIS — S86.812A STRAIN OF LEFT CALF MUSCLE: Primary | ICD-10-CM

## 2023-02-10 LAB
ALBUMIN SERPL-MCNC: 3.6 G/DL (ref 3.5–5)
ALBUMIN/GLOB SERPL: 0.9 (ref 1.1–2.2)
ALP SERPL-CCNC: 69 U/L (ref 45–117)
ALT SERPL-CCNC: 24 U/L (ref 12–78)
ANION GAP SERPL CALC-SCNC: 4 MMOL/L (ref 5–15)
AST SERPL-CCNC: 18 U/L (ref 15–37)
ATRIAL RATE: 79 BPM
BASOPHILS # BLD: 0.1 K/UL (ref 0–0.1)
BASOPHILS NFR BLD: 1 % (ref 0–1)
BILIRUB SERPL-MCNC: 0.2 MG/DL (ref 0.2–1)
BUN SERPL-MCNC: 14 MG/DL (ref 6–20)
BUN/CREAT SERPL: 13 (ref 12–20)
CALCIUM SERPL-MCNC: 9.3 MG/DL (ref 8.5–10.1)
CALCULATED P AXIS, ECG09: 67 DEGREES
CALCULATED R AXIS, ECG10: 35 DEGREES
CALCULATED T AXIS, ECG11: 77 DEGREES
CHLORIDE SERPL-SCNC: 105 MMOL/L (ref 97–108)
CO2 SERPL-SCNC: 28 MMOL/L (ref 21–32)
COMMENT, HOLDF: NORMAL
CREAT SERPL-MCNC: 1.05 MG/DL (ref 0.55–1.02)
D DIMER PPP FEU-MCNC: 0.26 MG/L FEU (ref 0–0.65)
DIAGNOSIS, 93000: NORMAL
DIFFERENTIAL METHOD BLD: ABNORMAL
EOSINOPHIL # BLD: 0.4 K/UL (ref 0–0.4)
EOSINOPHIL NFR BLD: 4 % (ref 0–7)
ERYTHROCYTE [DISTWIDTH] IN BLOOD BY AUTOMATED COUNT: 14.3 % (ref 11.5–14.5)
GLOBULIN SER CALC-MCNC: 4.1 G/DL (ref 2–4)
GLUCOSE SERPL-MCNC: 94 MG/DL (ref 65–100)
HCT VFR BLD AUTO: 43.2 % (ref 35–47)
HGB BLD-MCNC: 13.6 G/DL (ref 11.5–16)
IMM GRANULOCYTES # BLD AUTO: 0.1 K/UL (ref 0–0.04)
IMM GRANULOCYTES NFR BLD AUTO: 1 % (ref 0–0.5)
LYMPHOCYTES # BLD: 2.8 K/UL (ref 0.8–3.5)
LYMPHOCYTES NFR BLD: 30 % (ref 12–49)
MCH RBC QN AUTO: 29.1 PG (ref 26–34)
MCHC RBC AUTO-ENTMCNC: 31.5 G/DL (ref 30–36.5)
MCV RBC AUTO: 92.3 FL (ref 80–99)
MONOCYTES # BLD: 1 K/UL (ref 0–1)
MONOCYTES NFR BLD: 11 % (ref 5–13)
NEUTS SEG # BLD: 5 K/UL (ref 1.8–8)
NEUTS SEG NFR BLD: 53 % (ref 32–75)
NRBC # BLD: 0 K/UL (ref 0–0.01)
NRBC BLD-RTO: 0 PER 100 WBC
P-R INTERVAL, ECG05: 144 MS
PLATELET # BLD AUTO: 367 K/UL (ref 150–400)
PMV BLD AUTO: 9.3 FL (ref 8.9–12.9)
POTASSIUM SERPL-SCNC: 3.9 MMOL/L (ref 3.5–5.1)
PROT SERPL-MCNC: 7.7 G/DL (ref 6.4–8.2)
Q-T INTERVAL, ECG07: 378 MS
QRS DURATION, ECG06: 68 MS
QTC CALCULATION (BEZET), ECG08: 433 MS
RBC # BLD AUTO: 4.68 M/UL (ref 3.8–5.2)
SAMPLES BEING HELD,HOLD: NORMAL
SODIUM SERPL-SCNC: 137 MMOL/L (ref 136–145)
TROPONIN I SERPL HS-MCNC: 4 NG/L (ref 0–51)
VENTRICULAR RATE, ECG03: 79 BPM
WBC # BLD AUTO: 9.3 K/UL (ref 3.6–11)

## 2023-02-10 PROCEDURE — 80053 COMPREHEN METABOLIC PANEL: CPT

## 2023-02-10 PROCEDURE — 93971 EXTREMITY STUDY: CPT

## 2023-02-10 PROCEDURE — 96374 THER/PROPH/DIAG INJ IV PUSH: CPT

## 2023-02-10 PROCEDURE — 85025 COMPLETE CBC W/AUTO DIFF WBC: CPT

## 2023-02-10 PROCEDURE — 74011250636 HC RX REV CODE- 250/636: Performed by: STUDENT IN AN ORGANIZED HEALTH CARE EDUCATION/TRAINING PROGRAM

## 2023-02-10 PROCEDURE — 99284 EMERGENCY DEPT VISIT MOD MDM: CPT

## 2023-02-10 PROCEDURE — 85379 FIBRIN DEGRADATION QUANT: CPT

## 2023-02-10 PROCEDURE — 84484 ASSAY OF TROPONIN QUANT: CPT

## 2023-02-10 PROCEDURE — 36415 COLL VENOUS BLD VENIPUNCTURE: CPT

## 2023-02-10 RX ORDER — CLINDAMYCIN PHOSPHATE 10 UG/ML
LOTION TOPICAL
COMMUNITY
Start: 2023-02-07

## 2023-02-10 RX ORDER — ASPIRIN 325 MG
TABLET, DELAYED RELEASE (ENTERIC COATED) ORAL
COMMUNITY
Start: 2023-01-16

## 2023-02-10 RX ORDER — KETOROLAC TROMETHAMINE 30 MG/ML
30 INJECTION, SOLUTION INTRAMUSCULAR; INTRAVENOUS
Status: COMPLETED | OUTPATIENT
Start: 2023-02-10 | End: 2023-02-10

## 2023-02-10 RX ORDER — IBUPROFEN 800 MG/1
800 TABLET ORAL
Qty: 20 TABLET | Refills: 0 | Status: SHIPPED | OUTPATIENT
Start: 2023-02-10 | End: 2023-02-17

## 2023-02-10 RX ORDER — BUPROPION HYDROCHLORIDE 150 MG/1
150 TABLET ORAL DAILY
Qty: 90 TABLET | Refills: 3 | Status: SHIPPED | OUTPATIENT
Start: 2023-02-10

## 2023-02-10 RX ADMIN — KETOROLAC TROMETHAMINE 30 MG: 30 INJECTION, SOLUTION INTRAMUSCULAR at 15:55

## 2023-02-10 RX ADMIN — SODIUM CHLORIDE 1000 ML: 9 INJECTION, SOLUTION INTRAVENOUS at 15:55

## 2023-02-10 NOTE — ED PROVIDER NOTES
Patient is a 47year old female who presents to ED c/o left lower extremity pain which started earlier today. Patient reports a \"pulling sensation\" in her left calf. 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 Denies any known injury or trauma. Patient reports she recently drove from [place] to [place] on 1/31. She also c/o slight shortness of breath and \"not feeling right\" that started upon getting to the ED. she denies any fever, chills, chest pain, palpitations, difficulty breathing, cough, visual changes, headache, syncope.   She has not taken any medications prior to arrival.       Past Medical History:   Diagnosis Date    Allergic rhinitis 6/14/2013    Asthma     Chronic insomnia 3/28/2017    GERD (gastroesophageal reflux disease)     Headache     Headache(784.0)     Hypercholesteremia 1/23/2012    Non morbid obesity 3/28/2017    Snoring     Vitamin D deficiency 10/20/2015       Past Surgical History:   Procedure Laterality Date    ENDOSCOPY, COLON, DIAGNOSTIC  12/03 & 04/09    HX GYN      Laproscopy, BTL,TVH    HX GYN      hysterectomy    HX HYSTERECTOMY      HX OTHER SURGICAL  11/22/2019    left knee repair torn meniscus          Family History:   Problem Relation Age of Onset    Hypertension Father     Kidney Disease Maternal Grandmother     Hypertension Maternal Grandmother     Heart Attack Paternal Grandfather     Hypertension Mother     Cancer Maternal Grandfather         lung       Social History     Socioeconomic History    Marital status:      Spouse name: Not on file    Number of children: Not on file    Years of education: Not on file    Highest education level: Not on file   Occupational History    Not on file   Tobacco Use    Smoking status: Never    Smokeless tobacco: Never   Vaping Use    Vaping Use: Never used   Substance and Sexual Activity    Alcohol use: Yes     Comment: Occasionally    Drug use: No    Sexual activity: Yes Partners: Male     Birth control/protection: None   Other Topics Concern    Not on file   Social History Narrative    Not on file     Social Determinants of Health     Financial Resource Strain: Not on file   Food Insecurity: Not on file   Transportation Needs: Not on file   Physical Activity: Not on file   Stress: Not on file   Social Connections: Not on file   Intimate Partner Violence: Not on file   Housing Stability: Not on file         ALLERGIES: Latex and Aspirin    Review of Systems   Constitutional:  Negative for activity change, appetite change, chills and fever. HENT:  Negative for congestion and sore throat. Eyes:  Negative for pain and visual disturbance. Respiratory:  Positive for shortness of breath. Negative for cough. Cardiovascular:  Positive for leg swelling. Negative for chest pain and palpitations. Gastrointestinal:  Negative for abdominal distention, abdominal pain, constipation, diarrhea, nausea and vomiting. Genitourinary:  Negative for decreased urine volume, dysuria, flank pain, frequency and urgency. Musculoskeletal:  Positive for myalgias. Negative for arthralgias, back pain and neck pain. Skin:  Negative for rash and wound. Allergic/Immunologic: Negative for immunocompromised state. Neurological:  Negative for dizziness, syncope, weakness, light-headedness, numbness and headaches. Psychiatric/Behavioral:  Negative for confusion. All other systems reviewed and are negative. Vitals:    02/10/23 1351   BP: 133/79   Pulse: 75   Resp: 16   Temp: 98 °F (36.7 °C)   SpO2: 99%            Physical Exam  Vitals and nursing note reviewed. Constitutional:       General: She is not in acute distress. Appearance: Normal appearance. She is well-developed. She is not toxic-appearing. HENT:      Head: Normocephalic and atraumatic.       Nose: Nose normal.      Mouth/Throat:      Mouth: Mucous membranes are moist.   Eyes:      General: Lids are normal.      Extraocular Movements: Extraocular movements intact. Conjunctiva/sclera: Conjunctivae normal.   Cardiovascular:      Rate and Rhythm: Normal rate and regular rhythm. Pulses: Normal pulses. Heart sounds: Normal heart sounds, S1 normal and S2 normal.   Pulmonary:      Effort: Pulmonary effort is normal. No accessory muscle usage or respiratory distress. Breath sounds: Normal breath sounds. No stridor. No wheezing, rhonchi or rales. Chest:      Chest wall: No tenderness. Abdominal:      Palpations: Abdomen is soft. Musculoskeletal:         General: Normal range of motion. Cervical back: Normal range of motion and neck supple. Comments: Left calf is tender to palpation. There is no swelling noted. Calves are soft and equal in size bilaterally. Ambulates without difficulty. NVI distally. Skin:     General: Skin is warm and dry. Capillary Refill: Capillary refill takes less than 2 seconds. Neurological:      General: No focal deficit present. Mental Status: She is alert and oriented to person, place, and time. Mental status is at baseline. Psychiatric:         Attention and Perception: Attention normal.         Mood and Affect: Mood and affect normal.         Speech: Speech normal.         Behavior: Behavior is cooperative. Thought Content: Thought content normal.         Cognition and Memory: Cognition normal.         Judgment: Judgment normal.        Medical Decision Making  Patient presented with left calf pain that started earlier today. She recently went on a long drive from [place]. No swelling noted on exam.  Denies history of prior DVT. She also reported some shortness of breath and feeling \"off. \"  Vital signs stable. No signs of respiratory distress on exam.  Duplex is negative to rule out DVT. Labs are also unremarkable. No leukocytosis noted. EKG normal sinus rhythm. Initial troponin for which is low risk to rule out ACS.   D-dimer also negative to rule out PE. Patient offered chest x-ray which she does not feel is needed at this time. Discussed results and advised RICE and symptomatic care for likely a calf strain. Recommended follow-up with PCP as well as Ortho if needed. Return to ER precautions discussed in detail with patient. All questions addressed and answered. Amount and/or Complexity of Data Reviewed  Labs: ordered. ECG/medicine tests: ordered. Risk  Prescription drug management. ED Course as of 02/10/23 1556   Fri Feb 10, 2023   1518 CBC WITH AUTOMATED DIFF(!):    WBC 9.3   RBC 4.68   HGB 13.6   HCT 43.2   MCV 92.3   MCH 29.1   MCHC 31.5   RDW 14.3   PLATELET 386   MPV 9.3   NRBC 0.0   ABSOLUTE NRBC 0.00   NEUTROPHILS 53   LYMPHOCYTES 30   MONOCYTES 11   EOSINOPHILS 4   BASOPHILS 1   IMMATURE GRANULOCYTES 1(!)   ABS. NEUTROPHILS 5.0   ABS. LYMPHOCYTES 2.8   ABS. MONOCYTES 1.0   ABS. EOSINOPHILS 0.4   ABS. BASOPHILS 0.1   ABS. IMM. GRANS. 0.1(!)   DF AUTOMATED [KG]   1518 COMPREHENSIVE METABOLIC PANEL(!):    Sodium 137   Potassium 3.9   Chloride 105   CO2 28   Anion gap 4(!)   Glucose 94   BUN 14   Creatinine 1.05(!)   BUN/Creatinine ratio 13   eGFR >60   Calcium 9.3   Bilirubin, total 0.2   ALT 24   AST 18   Alk.  phosphatase 69   Protein, total 7.7   Albumin 3.6   Globulin 4.1(!)   A-G Ratio 0.9(!) [KG]   1518 D DIMER:    D-dimer 0.26 [KG]   1556 TROPONIN-HIGH SENSITIVITY:    Troponin-High Sensitivity 4 [KG]      ED Course User Index  [KG] Jose Mancini       Procedures

## 2023-02-10 NOTE — Clinical Note
[address]  [address] [facility] [address]  [phone]    Work/School Note    Date: 2/10/2023    To Whom It May concern:    Mervat Adler was seen and treated today in the emergency room by the following provider(s):  Attending Provider: Allison Montelongo MD  Physician Assistant: JORGE Gonzalez.      Mervat Adler is excused from work/school on 02/10/23 and 02/11/23. She is medically clear to return to work/school on 2/12/2023.        Sincerely,          JORGE Mera

## 2023-02-10 NOTE — Clinical Note
[address]  [address] [facility] [address]  [phone]    Work/School Note    Date: 2/10/2023    To Whom It May concern:    Emanuel Major was seen and treated today in the emergency room by the following provider(s):  Attending Provider: Alvarez Bruce MD  Physician Assistant: JORGE Owens.      Emanuel Major is excused from work/school on 02/10/23 and 02/11/23. She is medically clear to return to work/school on 2/12/2023.        Sincerely,          JORGE Moreland

## 2023-02-10 NOTE — ED TRIAGE NOTES
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 Reports SOB and dizziness. Denies taking OTC.

## 2023-02-10 NOTE — DISCHARGE INSTRUCTIONS
Alternate tylenol 1000mg and ibuprofen 800mg every 4 hours as needed for pain. Recommend elevating your leg, apply ice for 30 minutes 2-3 times per day. Please follow-up with your PCP or ortho if needed. LVM for pt to c/b to schedule requested appt

## 2023-03-08 ENCOUNTER — OFFICE VISIT (OUTPATIENT)
Dept: ORTHOPEDIC SURGERY | Age: 53
End: 2023-03-08

## 2023-03-08 VITALS — BODY MASS INDEX: 34.87 KG/M2 | WEIGHT: 217 LBS | HEIGHT: 66 IN

## 2023-03-08 DIAGNOSIS — M25.512 ACUTE PAIN OF LEFT SHOULDER: Primary | ICD-10-CM

## 2023-03-08 DIAGNOSIS — M79.632 LEFT FOREARM PAIN: ICD-10-CM

## 2023-03-08 DIAGNOSIS — M54.12 LEFT CERVICAL RADICULOPATHY: ICD-10-CM

## 2023-03-08 DIAGNOSIS — M79.642 LEFT HAND PAIN: ICD-10-CM

## 2023-03-08 RX ORDER — METHYLPREDNISOLONE 4 MG/1
TABLET ORAL
Qty: 1 DOSE PACK | Refills: 0 | Status: SHIPPED | OUTPATIENT
Start: 2023-03-08

## 2023-03-08 NOTE — LETTER
3/8/2023 2:35 PM    MsMeli Huizar [facility] [id number]      Ms. Nicole was seen in the office today by Dr. Radhika Mariano. We are asking that she be out of work from 3/8/2023 returning to work on 3/13/2023. If there are any questions or concerns, please have the patient reach out to the office at [phone].         Sincerely,      Salina Gilbert MD

## 2023-03-08 NOTE — PROGRESS NOTES
Shiv Carter (: 1970) is a 47 y.o. female patient here for evaluation of the following chief complaint(s):  Arm Pain (Left arm numbness and tingling)       ASSESSMENT/PLAN:  Below is the assessment and plan developed based on review of pertinent history, physical exam, labs, studies, and medications. 1. Acute pain of left shoulder  -     XR SHOULDER LT AP/LAT MIN 2 V; Future  2. Left forearm pain  -     XR FOREARM LT AP/LAT; Future  3. Left hand pain  -     XR HAND LT MIN 3 V; Future  4. Left cervical radiculopathy  -     methylPREDNISolone (MEDROL DOSEPACK) 4 mg tablet; Per dose pack instructions, Normal, Disp-1 Dose Pack, R-[de-identified]   50-year-old female with likely left cervical radiculopathy. I prescribed her Medrol Dosepak and referred her to see the someone from the spine team for further work-up. I also gave her a work note keeping her out of work the rest of the week and can return Monday. We discussed uncertain prognosis at this time and that more will be clear after spine evaluation. Discussed follow-up with me as needed. Patient verbalized understanding and elected to proceed. All questions were answered to the patient's apparent satisfaction. SUBJECTIVE/OBJECTIVE:  HPI    50-year-old female with left upper extremity pain, she states this began out of the blue and has been radiating from the shoulder all the way down to the hand. She reports pain on the dorsum of the hand. She states its been tough to get comfortable. Initially it was more intermittent but now occurs pretty much constantly. She is not tried anything to relieve it at this point but getting worse. Patient did request x-rays of the upper extremity today. Patient reports a sudden onset of symptoms. Duration of problem 2 months.   Symptom Severity 10/10  Symptom Frequency intermittent        Allergies   Allergen Reactions    Latex Rash    Aspirin Other (comments)     \"It doesn't digest in my system\" Augmentin [Amoxicillin-Pot Clavulanate] Nausea and Vomiting       Current Outpatient Medications   Medication Sig    methylPREDNISolone (MEDROL DOSEPACK) 4 mg tablet Per dose pack instructions    buPROPion XL (Wellbutrin XL) 150 mg tablet Take 1 Tablet by mouth daily. cholecalciferol (VITAMIN D3) (50,000 UNITS /1250 MCG) capsule TAKE 1 CAPSULE BY MOUTH ONE TIME PER WEEK    clindamycin (CLEOCIN T) 1 % lotion     albuterol (PROVENTIL HFA, VENTOLIN HFA, PROAIR HFA) 90 mcg/actuation inhaler Take 2 Puffs by inhalation every six (6) hours as needed for Wheezing. albuterol (PROVENTIL VENTOLIN) 2.5 mg /3 mL (0.083 %) nebu 3 mL by Nebulization route every six (6) hours as needed for Wheezing. benzonatate (TESSALON) 200 mg capsule Take 1 Capsule by mouth three (3) times daily as needed for Cough. Allergy Relief, fexofenadine, 180 mg tablet TAKE 1 TABLET BY MOUTH EVERY DAY AS NEEDED     No current facility-administered medications for this visit.        Social History     Socioeconomic History    Marital status:      Spouse name: Not on file    Number of children: Not on file    Years of education: Not on file    Highest education level: Not on file   Occupational History    Not on file   Tobacco Use    Smoking status: Never    Smokeless tobacco: Never   Vaping Use    Vaping Use: Never used   Substance and Sexual Activity    Alcohol use: Yes     Comment: Occasionally    Drug use: No    Sexual activity: Yes     Partners: Male     Birth control/protection: None   Other Topics Concern    Not on file   Social History Narrative    Not on file     Social Determinants of Health     Financial Resource Strain: Not on file   Food Insecurity: Not on file   Transportation Needs: Not on file   Physical Activity: Not on file   Stress: Not on file   Social Connections: Not on file   Intimate Partner Violence: Not on file   Housing Stability: Not on file       Past Surgical History:   Procedure Laterality Date    ENDOSCOPY, COLON, DIAGNOSTIC  12/03 & 04/09    HX GYN      Laproscopy, BTL,TVH    HX GYN      hysterectomy    HX HYSTERECTOMY      HX OTHER SURGICAL  11/22/2019    left knee repair torn meniscus        Family History   Problem Relation Age of Onset    Hypertension Father     Kidney Disease Maternal Grandmother     Hypertension Maternal Grandmother     Heart Attack Paternal Grandfather     Hypertension Mother     Cancer Maternal Grandfather         lung            Review of Systems    No flowsheet data found. Vitals:  Ht 5' 5.5\" (1.664 m)   Wt 217 lb (98.4 kg)   BMI 35.56 kg/m²    Estimated body surface area is 2.13 meters squared as calculated from the following:    Height as of this encounter: 5' 5.5\" (1.664 m). Weight as of this encounter: 217 lb (98.4 kg). Body mass index is 35.56 kg/m². Physical Exam    Musculoskeletal Exam:    Left Upper Extremity EXAMINATION    Patient has positive median nerve compression test and positive Tinel's over the median nerve. Negative Tinel's over the ulnar nerve and negative ulnar nerve compression test.  Positive Phalen's, negative elbow flexion test.    Patient reports diminished sensation on the dorsum of the hand over the index and middle finger and thumb the volar aspect of the hand sensation her she reports is normal.    Positive Spurling's with reproduction of pain down the left upper extremity. Patient fires AIN, PIN and ulnar nerves. Sensation is grossly intact in the median, radial and ulnar distribution. Hand is pink and appears well-perfused. Hand is warm. Skin is intact. Compartments are soft and compressible.       Consitutional: Healthy  Skin:   - Edema - none  - Cellulitis - No    Neuro: Numbness or tingling in R/L arm: Yes    Psych: Affect normal    Cardiovascular: Capillary Refill < 2 seconds in upper extremities    Respiratory: Non-Labored Breathing    ROS:    Constitutional: Denies fever/chills    Respiratory: Denies SOB        Imaging:    XR Results (maximum last 3): Results from Appointment encounter on 03/08/23    XR HAND LT MIN 3 V    Narrative  Left Hand Xray  Indication: pain  Views: 3 views, AP/LAT/OBL    Interpretation: 3 views of the left hand are reviewed and show no acute or chronic osseous abnormalities. There are no fractures or dislocations noted. The carpal alignment is well-maintained with no obvious abnormalities noted. There is no evidence indicating obvious ligament tear noted on these x-rays. Normal bone architecture is noted. No evidence of soft tissue swelling is noted. XR SHOULDER LT AP/LAT MIN 2 V    Narrative  Left Shoulder Xray:  Indication: pain  Views: 3 views - Grashey, Axillary, Scap-Y  Interpretation:  -3 views of the left shoulder are reviewed and show normal bone architecture. The humerus is well located within the glenoid. There is no arthritis of the glenohumeral joint or acromioclavicular joint. There is no evidence of high riding of the humeral head. No evidence of fracture or subluxation. There is no evidence of soft tissue swelling. XR FOREARM LT AP/LAT    Narrative  Left Forearm Xray  Indication: pain  Views: 2 views, AP/LAT    Interpretation: 2 views of the left forearm are reviewed and show normal bone architecture and no evidence of fracture or arthritis. The alignment appears normal with no evidence of major ligament tear. Not a dedicated wrist view however the radiocarpal, midcarpal and scapholunate relationships are normal.  There is no evidence of soft tissue swelling.   Overall forearm alignment is normal.            Orders Placed This Encounter    XR SHOULDER LT AP/LAT MIN 2 V     Standing Status:   Future     Number of Occurrences:   1     Standing Expiration Date:   3/8/2024    XR FOREARM LT AP/LAT     Standing Status:   Future     Number of Occurrences:   1     Standing Expiration Date:   3/8/2024    XR HAND LT MIN 3 V     Standing Status:   Future     Number of Occurrences:   1 Standing Expiration Date:   3/8/2024    methylPREDNISolone (MEDROL DOSEPACK) 4 mg tablet     Sig: Per dose pack instructions     Dispense:  1 Dose Pack     Refill:  0            An electronic signature was used to authenticate this note.   -- Divina Stephens MD

## 2023-04-03 ENCOUNTER — TELEPHONE (OUTPATIENT)
Dept: FAMILY MEDICINE CLINIC | Age: 53
End: 2023-04-03

## 2023-04-04 NOTE — TELEPHONE ENCOUNTER
Pt requesting letter stating she has migraine headaches from Dr. Loretta Robins. Pt received rejection sticker for having tinted windows on her vehicle and states she has them to keep the glare from the sun out of her car to avoid triggering migraines. Pt advised this would probably require appointment and scheduled for first available appointment for virtual visit 4/7/23. Please advise if pt does not need to be seen for this.  Letty

## 2023-04-07 ENCOUNTER — VIRTUAL VISIT (OUTPATIENT)
Dept: FAMILY MEDICINE CLINIC | Age: 53
End: 2023-04-07
Payer: COMMERCIAL

## 2023-04-07 PROCEDURE — 99214 OFFICE O/P EST MOD 30 MIN: CPT | Performed by: FAMILY MEDICINE

## 2023-05-10 DIAGNOSIS — G43.909 MIGRAINE, UNSPECIFIED, NOT INTRACTABLE, WITHOUT STATUS MIGRAINOSUS: ICD-10-CM

## 2023-05-10 RX ORDER — PROPRANOLOL HYDROCHLORIDE 80 MG/1
CAPSULE, EXTENDED RELEASE ORAL
Qty: 30 CAPSULE | Refills: 1 | Status: SHIPPED | OUTPATIENT
Start: 2023-05-10

## 2023-10-26 ENCOUNTER — TELEPHONE (OUTPATIENT)
Facility: CLINIC | Age: 53
End: 2023-10-26

## 2023-10-26 NOTE — TELEPHONE ENCOUNTER
----- Message from Maddie Gaviria sent at 10/24/2023  2:43 PM EDT -----  Subject: Referral Request    Reason for referral request? pt is wanting blood work done. due to   cramping in her legs and toes. She states she is currently not   experiencing any symptoms right now. Provider patient wants to be referred to(if known): Darryl Mayfield    Provider Phone Number(if known):     Additional Information for Provider?   ---------------------------------------------------------------------------  --------------  Hernan [place] Rajani    [phone]; OK to leave message on voicemail  ---------------------------------------------------------------------------  --------------

## 2023-12-04 ENCOUNTER — HOSPITAL ENCOUNTER (EMERGENCY)
Facility: HOSPITAL | Age: 53
Discharge: HOME | End: 2023-12-05
Payer: COMMERCIAL

## 2023-12-04 VITALS
RESPIRATION RATE: 20 BRPM | SYSTOLIC BLOOD PRESSURE: 131 MMHG | OXYGEN SATURATION: 98 % | WEIGHT: 230 LBS | HEART RATE: 68 BPM | TEMPERATURE: 97.7 F | HEIGHT: 66 IN | DIASTOLIC BLOOD PRESSURE: 76 MMHG | BODY MASS INDEX: 36.96 KG/M2

## 2023-12-04 DIAGNOSIS — G43.709 CHRONIC MIGRAINE WITHOUT AURA WITHOUT STATUS MIGRAINOSUS, NOT INTRACTABLE: Primary | ICD-10-CM

## 2023-12-04 DIAGNOSIS — M79.10 MYALGIA: ICD-10-CM

## 2023-12-04 DIAGNOSIS — R60.9 PERIPHERAL EDEMA: ICD-10-CM

## 2023-12-04 LAB
ALBUMIN SERPL-MCNC: 3.7 G/DL (ref 3.5–5)
ALBUMIN/GLOB SERPL: 0.9 (ref 1.1–2.2)
ALP SERPL-CCNC: 93 U/L (ref 45–117)
ALT SERPL-CCNC: 23 U/L (ref 12–78)
ANION GAP SERPL CALC-SCNC: 4 MMOL/L (ref 5–15)
AST SERPL-CCNC: 19 U/L (ref 15–37)
BASOPHILS # BLD: 0.1 K/UL (ref 0–0.1)
BASOPHILS NFR BLD: 1 % (ref 0–1)
BILIRUB SERPL-MCNC: 0.3 MG/DL (ref 0.2–1)
BUN SERPL-MCNC: 18 MG/DL (ref 6–20)
BUN/CREAT SERPL: 16 (ref 12–20)
CALCIUM SERPL-MCNC: 9.2 MG/DL (ref 8.5–10.1)
CHLORIDE SERPL-SCNC: 102 MMOL/L (ref 97–108)
CO2 SERPL-SCNC: 31 MMOL/L (ref 21–32)
CREAT SERPL-MCNC: 1.14 MG/DL (ref 0.55–1.02)
DIFFERENTIAL METHOD BLD: ABNORMAL
EOSINOPHIL # BLD: 0.5 K/UL (ref 0–0.4)
EOSINOPHIL NFR BLD: 4 % (ref 0–7)
ERYTHROCYTE [DISTWIDTH] IN BLOOD BY AUTOMATED COUNT: 14.5 % (ref 11.5–14.5)
FLUAV AG NPH QL IA: NEGATIVE
FLUBV AG NOSE QL IA: NEGATIVE
GLOBULIN SER CALC-MCNC: 4.3 G/DL (ref 2–4)
GLUCOSE SERPL-MCNC: 96 MG/DL (ref 65–100)
HCT VFR BLD AUTO: 42.3 % (ref 35–47)
HGB BLD-MCNC: 13.9 G/DL (ref 11.5–16)
IMM GRANULOCYTES # BLD AUTO: 0.1 K/UL (ref 0–0.04)
IMM GRANULOCYTES NFR BLD AUTO: 1 % (ref 0–0.5)
LYMPHOCYTES # BLD: 3.9 K/UL (ref 0.8–3.5)
LYMPHOCYTES NFR BLD: 30 % (ref 12–49)
MCH RBC QN AUTO: 29.1 PG (ref 26–34)
MCHC RBC AUTO-ENTMCNC: 32.9 G/DL (ref 30–36.5)
MCV RBC AUTO: 88.7 FL (ref 80–99)
MONOCYTES # BLD: 0.9 K/UL (ref 0–1)
MONOCYTES NFR BLD: 7 % (ref 5–13)
NEUTS SEG # BLD: 7.4 K/UL (ref 1.8–8)
NEUTS SEG NFR BLD: 57 % (ref 32–75)
NRBC # BLD: 0 K/UL (ref 0–0.01)
NRBC BLD-RTO: 0 PER 100 WBC
NT PRO BNP: 11 PG/ML
PLATELET # BLD AUTO: 407 K/UL (ref 150–400)
PMV BLD AUTO: 9 FL (ref 8.9–12.9)
POTASSIUM SERPL-SCNC: 4 MMOL/L (ref 3.5–5.1)
PROT SERPL-MCNC: 8 G/DL (ref 6.4–8.2)
RBC # BLD AUTO: 4.77 M/UL (ref 3.8–5.2)
SARS-COV-2 RDRP RESP QL NAA+PROBE: NOT DETECTED
SODIUM SERPL-SCNC: 137 MMOL/L (ref 136–145)
SOURCE: NORMAL
WBC # BLD AUTO: 12.8 K/UL (ref 3.6–11)

## 2023-12-04 PROCEDURE — 85025 COMPLETE CBC W/AUTO DIFF WBC: CPT

## 2023-12-04 PROCEDURE — 96375 TX/PRO/DX INJ NEW DRUG ADDON: CPT

## 2023-12-04 PROCEDURE — 36415 COLL VENOUS BLD VENIPUNCTURE: CPT

## 2023-12-04 PROCEDURE — 87635 SARS-COV-2 COVID-19 AMP PRB: CPT

## 2023-12-04 PROCEDURE — 80053 COMPREHEN METABOLIC PANEL: CPT

## 2023-12-04 PROCEDURE — 83880 ASSAY OF NATRIURETIC PEPTIDE: CPT

## 2023-12-04 PROCEDURE — 87804 INFLUENZA ASSAY W/OPTIC: CPT

## 2023-12-04 PROCEDURE — 96374 THER/PROPH/DIAG INJ IV PUSH: CPT

## 2023-12-04 PROCEDURE — 6370000000 HC RX 637 (ALT 250 FOR IP)

## 2023-12-04 PROCEDURE — 99284 EMERGENCY DEPT VISIT MOD MDM: CPT

## 2023-12-04 PROCEDURE — 6360000002 HC RX W HCPCS

## 2023-12-04 PROCEDURE — 93005 ELECTROCARDIOGRAM TRACING: CPT | Performed by: EMERGENCY MEDICINE

## 2023-12-04 RX ORDER — BUTALBITAL, ACETAMINOPHEN AND CAFFEINE 300; 40; 50 MG/1; MG/1; MG/1
1 CAPSULE ORAL EVERY 6 HOURS PRN
Qty: 20 CAPSULE | Refills: 0 | Status: SHIPPED | OUTPATIENT
Start: 2023-12-04

## 2023-12-04 RX ORDER — BUTALBITAL, ACETAMINOPHEN AND CAFFEINE 50; 325; 40 MG/1; MG/1; MG/1
1 TABLET ORAL
Status: COMPLETED | OUTPATIENT
Start: 2023-12-04 | End: 2023-12-04

## 2023-12-04 RX ORDER — DIPHENHYDRAMINE HYDROCHLORIDE 50 MG/ML
25 INJECTION INTRAMUSCULAR; INTRAVENOUS ONCE
Status: COMPLETED | OUTPATIENT
Start: 2023-12-04 | End: 2023-12-04

## 2023-12-04 RX ORDER — PROCHLORPERAZINE EDISYLATE 5 MG/ML
5 INJECTION INTRAMUSCULAR; INTRAVENOUS ONCE
Status: COMPLETED | OUTPATIENT
Start: 2023-12-04 | End: 2023-12-04

## 2023-12-04 RX ADMIN — BUTALBITAL, ACETAMINOPHEN, AND CAFFEINE 1 TABLET: 50; 325; 40 TABLET ORAL at 21:36

## 2023-12-04 RX ADMIN — DIPHENHYDRAMINE HYDROCHLORIDE 25 MG: 50 INJECTION INTRAMUSCULAR; INTRAVENOUS at 23:11

## 2023-12-04 RX ADMIN — PROCHLORPERAZINE EDISYLATE 5 MG: 5 INJECTION INTRAMUSCULAR; INTRAVENOUS at 23:13

## 2023-12-04 ASSESSMENT — PAIN DESCRIPTION - LOCATION
LOCATION: HEAD;NECK
LOCATION: HEAD

## 2023-12-04 ASSESSMENT — PAIN SCALES - GENERAL
PAINLEVEL_OUTOF10: 9
PAINLEVEL_OUTOF10: 8
PAINLEVEL_OUTOF10: 8

## 2023-12-06 LAB
EKG ATRIAL RATE: 76 BPM
EKG DIAGNOSIS: NORMAL
EKG P AXIS: 72 DEGREES
EKG P-R INTERVAL: 152 MS
EKG Q-T INTERVAL: 392 MS
EKG QRS DURATION: 68 MS
EKG QTC CALCULATION (BAZETT): 441 MS
EKG R AXIS: 30 DEGREES
EKG T AXIS: 63 DEGREES
EKG VENTRICULAR RATE: 76 BPM

## 2024-01-01 NOTE — PATIENT INSTRUCTIONS
DISCHARGE SCREENS:  ONBS: 6/7: WIC Form: ###    Social Work ###   PCP/Pediatrician: ###    Learning About Sleeping Well  What does sleeping well mean? Sleeping well means getting enough sleep. How much sleep is enough varies among people. The number of hours you sleep is not as important as how you feel when you wake up. If you do not feel refreshed, you probably need more sleep. Another sign of not getting enough sleep is feeling tired during the day. The average total nightly sleep time is 7½ to 8 hours. Healthy adults may need a little more or a little less than this. Why is getting enough sleep important? Getting enough quality sleep is a basic part of good health. When your sleep suffers, your mood and your thoughts can suffer too. You may find yourself feeling more grumpy or stressed. Not getting enough sleep also can lead to serious problems, including injury, accidents, anxiety, and depression. What might cause poor sleeping? Many things can cause sleep problems, including:  Stress. Stress can be caused by fear about a single event, such as giving a speech. Or you may have ongoing stress, such as worry about work or school. Depression, anxiety, and other mental or emotional conditions. Changes in your sleep habits or surroundings. This includes changes that happen where you sleep, such as noise, light, or sleeping in a different bed. It also includes changes in your sleep pattern, such as having jet lag or working a late shift. Health problems, such as pain, breathing problems, and restless legs syndrome. Lack of regular exercise. How can you help yourself? Here are some tips that may help you sleep more soundly and wake up feeling more refreshed. Your sleeping area  Use your bedroom only for sleeping and sex. A bit of light reading may help you fall asleep. But if it doesn't, do your reading elsewhere in the house. Don't watch TV in bed. Be sure your bed is big enough to stretch out comfortably, especially if you have a sleep partner. Keep your bedroom quiet, dark, and cool. Use curtains, blinds, or a sleep mask to block out light. To block out noise, use earplugs, soothing music, or a \"white noise\" machine. Your evening and bedtime routine  Get regular exercise, but not within 3 to 4 hours before your bedtime. Create a relaxing bedtime routine. You might want to take a warm shower or bath, listen to soothing music, or drink a cup of noncaffeinated tea. Go to bed at the same time every night. And get up at the same time every morning, even if you feel tired. What to avoid  Limit caffeine (coffee, tea, caffeinated sodas) during the day, and don't have any for at least 4 to 6 hours before bedtime. Don't drink alcohol before bedtime. Alcohol can cause you to wake up more often during the night. Don't smoke or use tobacco, especially in the evening. Nicotine can keep you awake. Don't take naps during the day, especially close to bedtime. Don't lie in bed awake for too long. If you can't fall asleep, or if you wake up in the middle of the night and can't get back to sleep within 15 minutes or so, get out of bed and go to another room until you feel sleepy. Don't take medicine that may keep you awake, or make you feel hyper or energized, right before bed. Your doctor can tell you if your medicine may do this and if you can take it earlier in the day. If you can't sleep  Imagine yourself in a peaceful, pleasant scene. Focus on the details and feelings of being in a place that is relaxing. Get up and do a quiet or boring activity until you feel sleepy. Don't drink any liquids after 6 p.m. if you wake up often because you have to go to the bathroom. Where can you learn more? Go to The miqi.cn.be  Enter J938 in the search box to learn more about \"Learning About Sleeping Well. \"   © 5167-6440 Healthwise, Incorporated. Care instructions adapted under license by [facility] (which disclaims liability or warranty for this information).  This care instruction is for use with your licensed healthcare professional. If you have questions about a medical condition or this instruction, always ask your healthcare professional. [place] [address] any warranty or liability for your use of this information. Content Version: 8.9.40509; Last Revised: March 18, 2011          Insomnia: After Your Visit  Your Care Instructions  Insomnia is the inability to sleep well. It is a common problem for most people at some time. Insomnia may make it hard for you to get to sleep, stay asleep, or sleep as long as you need to. This can make you tired and grouchy during the day. It can also make you forgetful, less effective at work, and unhappy. Insomnia can be caused by conditions such as depression or anxiety. Pain can also affect your ability to sleep. When these problems are solved, the insomnia usually clears up. But sometimes bad sleep habits can cause insomnia. If insomnia is affecting your work or your enjoyment of life, you can take steps to improve your sleep. Follow-up care is a key part of your treatment and safety. Be sure to make and go to all appointments, and call your doctor if you are having problems. Its also a good idea to know your test results and keep a list of the medicines you take. How can you care for yourself at home? What to avoid  Do not have drinks with caffeine, such as coffee or black tea, for 8 hours before bed. Do not smoke or use other types of tobacco near bedtime. Nicotine is a stimulant and can keep you awake. Avoid drinking alcohol late in the evening, because it can cause you to wake in the middle of the night. Do not eat a big meal close to bedtime. If you are hungry, eat a light snack. Do not drink a lot of water close to bedtime, because the need to urinate may wake you up during the night. Do not read or watch TV in bed. Use the bed only for sleeping and sexual activity.   What to try  Go to bed at the same time every night, and wake up at the same time every morning. Do not take naps during the day. Keep your bedroom quiet, dark, and cool. Get regular exercise, but not within 3 to 4 hours of your bedtime. .   Sleep on a comfortable pillow and mattress. If watching the clock makes you anxious, turn it facing away from you so you cannot see the time. If you worry when you lie down, start a worry book. Well before bedtime, write down your worries, and then set the book and your concerns aside. Try meditation or other relaxation techniques before you go to bed. If you cannot fall asleep after 15 minutes, get up and go to another room until you feel sleepy. Do something relaxing. Repeat your bedtime routine before you go to bed again. Repeat this as often as necessary. Make your house quiet and calm about an hour before bedtime. Turn down the lights, turn off the TV, log off the computer, and turn down the volume on music. This can help you relax after a busy day. When should you call for help? Watch closely for changes in your health, and be sure to contact your doctor if:  Your efforts to improve your sleep do not work. Your insomnia gets worse. You fall asleep during the day. Where can you learn more? Go to Food[employer].be  Enter P513 in the search box to learn more about \"Insomnia: After Your Visit. \"   © 1776-1622 Healthwise, Incorporated. Care instructions adapted under license by [employer] (which disclaims liability or warranty for this information). This care instruction is for use with your licensed healthcare professional. If you have questions about a medical condition or this instruction, always ask your healthcare professional. [place] [address] any warranty or liability for your use of this information.   Content Version: 1.3.72131; Last Revised: June 26, 2010

## 2024-03-12 ENCOUNTER — OFFICE VISIT (OUTPATIENT)
Age: 54
End: 2024-03-12
Payer: COMMERCIAL

## 2024-03-12 VITALS
TEMPERATURE: 97.3 F | WEIGHT: 230 LBS | HEART RATE: 72 BPM | DIASTOLIC BLOOD PRESSURE: 74 MMHG | HEIGHT: 66 IN | RESPIRATION RATE: 18 BRPM | OXYGEN SATURATION: 97 % | SYSTOLIC BLOOD PRESSURE: 130 MMHG | BODY MASS INDEX: 36.96 KG/M2

## 2024-03-12 DIAGNOSIS — Q27.9 VENOUS ANOMALY: ICD-10-CM

## 2024-03-12 DIAGNOSIS — M54.2 CERVICALGIA: ICD-10-CM

## 2024-03-12 DIAGNOSIS — M54.81 BILATERAL OCCIPITAL NEURALGIA: ICD-10-CM

## 2024-03-12 DIAGNOSIS — G44.229 CHRONIC TENSION-TYPE HEADACHE, NOT INTRACTABLE: ICD-10-CM

## 2024-03-12 DIAGNOSIS — G43.709 CHRONIC MIGRAINE WITHOUT AURA WITHOUT STATUS MIGRAINOSUS, NOT INTRACTABLE: Primary | ICD-10-CM

## 2024-03-12 PROCEDURE — 99214 OFFICE O/P EST MOD 30 MIN: CPT | Performed by: PSYCHIATRY & NEUROLOGY

## 2024-03-12 RX ORDER — KETOROLAC TROMETHAMINE 10 MG/1
10 TABLET, FILM COATED ORAL EVERY 6 HOURS PRN
Qty: 20 TABLET | Refills: 0 | Status: SHIPPED | OUTPATIENT
Start: 2024-03-12

## 2024-03-12 RX ORDER — RIMEGEPANT SULFATE 75 MG/75MG
75 TABLET, ORALLY DISINTEGRATING ORAL EVERY OTHER DAY
Qty: 8 TABLET | Refills: 0 | COMMUNITY
Start: 2024-03-12

## 2024-03-12 RX ORDER — CYCLOBENZAPRINE HCL 5 MG
5 TABLET ORAL 3 TIMES DAILY PRN
COMMUNITY
Start: 2024-01-29

## 2024-03-12 RX ORDER — RIMEGEPANT SULFATE 75 MG/75MG
75 TABLET, ORALLY DISINTEGRATING ORAL EVERY OTHER DAY
Qty: 16 TABLET | Refills: 5 | Status: SHIPPED | OUTPATIENT
Start: 2024-03-12

## 2024-03-12 NOTE — PROGRESS NOTES
hours as needed      albuterol (PROVENTIL) (2.5 MG/3ML) 0.083% nebulizer solution Inhale 3 mLs into the lungs every 6 hours as needed      benzonatate (TESSALON) 200 MG capsule Take 1 capsule by mouth 3 times daily as needed      buPROPion (WELLBUTRIN XL) 150 MG extended release tablet Take 1 tablet by mouth daily      clindamycin (CLEOCIN T) 1 % lotion ceived the following from [facility]: Outside name: clindamycin (CLEOCIN T) 1 % lotion      fexofenadine (ALLEGRA) 180 MG tablet Take 1 tablet by mouth daily as needed       No current facility-administered medications on file prior to visit.     Allergies   Allergen Reactions    Latex Rash    Aspirin Other (See Comments)     \"It doesn't digest in my system\"    Amoxicillin-Pot Clavulanate Nausea And Vomiting        Social History     Tobacco Use    Smoking status: Never    Smokeless tobacco: Never   Substance Use Topics    Alcohol use: Yes    Drug use: No       Family History   Problem Relation Age of Onset    Hypertension Father     Kidney Disease Maternal Grandmother     Hypertension Maternal Grandmother     Heart Attack Paternal Grandfather     Hypertension Mother     Cancer Maternal Grandfather         lung          Subjective:      Kennedy Myers is a 53 y.o. RHAA female with history of chronic migraines, chronic tension headache, tremors, right occiput venous angioma and hyperlipidemia [facility]       [facility]. Patient was being seen for headaches. Onset 15 years ago. Severity: moderate to severe. It occurs daily, has more than one hour duration and is unchanged. Location was bitemporal and occipital. There was radiation to the neck and shoulders. The patient describes it as pressure and ache. Timing of headache: upon awakening. Denies aggravating factors. Denies relieving factors. Associated symptoms include phonophobia. Pertinent negatives

## 2024-03-21 ENCOUNTER — TELEPHONE (OUTPATIENT)
Age: 54
End: 2024-03-21

## 2024-03-21 NOTE — TELEPHONE ENCOUNTER
Called patient, LM, occipital nerve block has been approved. Can schedule you an appointment for tomorrow 3/22/2024 at 8:20am or 10:40am. Please call back with what time you would like.

## 2024-03-21 NOTE — TELEPHONE ENCOUNTER
RE:ZACHERY       Contacted patients insurance at [phone] and spoke with Tierra CABA who verified that CPT code:    49854  81600  M54.81     Does not require authorization    Call ref# [id number]      Nurse notified

## 2024-03-22 ENCOUNTER — PROCEDURE VISIT (OUTPATIENT)
Age: 54
End: 2024-03-22
Payer: COMMERCIAL

## 2024-03-22 DIAGNOSIS — M54.81 BILATERAL OCCIPITAL NEURALGIA: Primary | ICD-10-CM

## 2024-03-22 PROCEDURE — 64405 NJX AA&/STRD GR OCPL NRV: CPT | Performed by: PSYCHIATRY & NEUROLOGY

## 2024-03-22 NOTE — PROGRESS NOTES
[facility]UROLOG[facility] [place]  OFFICE PROCEDURE NOTE        Chart reviewed for the following:   Vinny ABDULLAHI Jr, MD, have reviewed the History, Physical and updated the Allergic reactions for Kennedy Myers     TIME OUT performed immediately prior to start of procedure:   Vinny ABDULALHI Jr, MD, have performed the following reviews on Kennedy Myers prior to the start of the procedure:            * Patient was identified by name and date of birth   * Agreement on procedure being performed was verified  * Risks and Benefits explained to the patient  * Procedure site verified and marked as necessary  * Patient was positioned for comfort  * Consent was signed and verified     Time: 0830  Date of procedure: 3/22/2024  Procedure performed by:  Vinny Esquivel Jr, MD  Provider assisted by: None  Patient assisted by: none  How tolerated by patient: well  Comments: None    Procedure documentation  Bilateral greater occipital nerve blocks  Medications: 12 cc of 0.50% bupivacaine (preservative-free).    Informed consent was obtained.  The patient was brought to the examination room and placed in a seated position.  The occiput was palpated at the point of the superior nuchal line, to identify the tenderness associated with passing of the greater occipital nerve.  The overlying skin was then sterilely prepped with alcohol.  Next, using sterile technique, a 27-gauge, 1-1/4 inch long needle was inserted into the subcutaneous tissue in proximity to the nerve, but not directly on top of the nerve.  There was no blood on aspiration.  The above medication solution was then gradually injected.  No paresthesias were reported by the patient.  The injection site was then cleansed with alcohol.  (If a bilateral injection was performed, then the contralateral side was injected in a similar manner, with a similar outcome.)  The patient tolerated the procedure well.  There were no complications.  The

## 2024-06-17 ENCOUNTER — OFFICE VISIT (OUTPATIENT)
Age: 54
End: 2024-06-17
Payer: COMMERCIAL

## 2024-06-17 VITALS
RESPIRATION RATE: 18 BRPM | WEIGHT: 226 LBS | TEMPERATURE: 97.7 F | OXYGEN SATURATION: 98 % | HEART RATE: 93 BPM | DIASTOLIC BLOOD PRESSURE: 74 MMHG | SYSTOLIC BLOOD PRESSURE: 128 MMHG | HEIGHT: 66 IN | BODY MASS INDEX: 36.32 KG/M2

## 2024-06-17 DIAGNOSIS — M54.81 BILATERAL OCCIPITAL NEURALGIA: ICD-10-CM

## 2024-06-17 DIAGNOSIS — M47.812 CERVICAL SPONDYLOSIS: ICD-10-CM

## 2024-06-17 DIAGNOSIS — G43.709 CHRONIC MIGRAINE WITHOUT AURA WITHOUT STATUS MIGRAINOSUS, NOT INTRACTABLE: Primary | ICD-10-CM

## 2024-06-17 DIAGNOSIS — Q27.9 VENOUS ANOMALY: ICD-10-CM

## 2024-06-17 PROCEDURE — 99214 OFFICE O/P EST MOD 30 MIN: CPT | Performed by: PSYCHIATRY & NEUROLOGY

## 2024-06-17 NOTE — PROGRESS NOTES
NEUROLOGY CLINIC NOTE    Patient ID:  Nktammya HOWARD Myers  [id number]  53 y.o.  1970    Date of Visit:  June 17, 2024    Reason for Visit: chronic headaches, neck pain    Chief Complaint   Patient presents with    Migraine     3 month follow up- patient reports she has had 5 migraines in the last 30 days. Patient states she just had neck surgery 6/4/2024          History of Present Illness:     Patient Active Problem List   Diagnosis    Migraines    Chronic insomnia    Hypercholesteremia    Cerebral vascular malformation    Mild intermittent asthma without complication    Allergy    Vitamin D deficiency    Allergic rhinitis    GERD (gastroesophageal reflux disease)    Non morbid obesity    Low back pain    Severe obesity (BMI 35.0-39.9) with comorbidity (HCC)    Neck pain       Past Medical History:   Diagnosis Date    Allergic rhinitis 6/14/2013    Asthma     Chronic insomnia 3/28/2017    GERD (gastroesophageal reflux disease)     Headache     Headache(784.0)     Hypercholesteremia 1/23/2012    Migraine     Non morbid obesity 3/28/2017    Snoring     Vitamin D deficiency 10/20/2015        Past Surgical History:   Procedure Laterality Date    COLONOSCOPY  12/03 & 04/09    GYN      Laproscopy, BTL,TVH    GYN      hysterectomy    HYSTERECTOMY (CERVIX STATUS UNKNOWN)      OTHER SURGICAL HISTORY  11/22/2019    left knee repair torn meniscus         Current Outpatient Medications on File Prior to Visit   Medication Sig Dispense Refill    cyclobenzaprine (FLEXERIL) 5 MG tablet Take 1 tablet by mouth 3 times daily as needed      Rimegepant Sulfate (NURTEC) 75 MG TBDP Take 75 mg by mouth every other day 16 tablet 5    butalbital-APAP-caffeine (FIORICET) -40 MG CAPS per capsule Take 1 capsule by mouth every 6 hours as needed for Headaches or Migraine 20 capsule 0    albuterol sulfate HFA (PROVENTIL;VENTOLIN;PROAIR) 108 (90 Base) MCG/ACT inhaler Inhale 2 puffs into the lungs every 6 hours as needed      albuterol

## 2024-07-31 ENCOUNTER — TELEPHONE (OUTPATIENT)
Age: 54
End: 2024-07-31

## 2024-07-31 NOTE — TELEPHONE ENCOUNTER
Called patient no answer. Left message stating I was calling to reschedule the appointment on 12/17/2024 Dr. GALLEGO will be out that week. Asked patient to call back as soon as possible to reschedule the appointment.

## 2024-08-11 ENCOUNTER — APPOINTMENT (OUTPATIENT)
Facility: HOSPITAL | Age: 54
End: 2024-08-11
Payer: COMMERCIAL

## 2024-08-11 ENCOUNTER — HOSPITAL ENCOUNTER (EMERGENCY)
Facility: HOSPITAL | Age: 54
Discharge: HOME | End: 2024-08-11
Attending: EMERGENCY MEDICINE
Payer: COMMERCIAL

## 2024-08-11 VITALS
WEIGHT: 235.45 LBS | OXYGEN SATURATION: 100 % | DIASTOLIC BLOOD PRESSURE: 80 MMHG | BODY MASS INDEX: 39.23 KG/M2 | TEMPERATURE: 98.1 F | RESPIRATION RATE: 16 BRPM | SYSTOLIC BLOOD PRESSURE: 146 MMHG | HEIGHT: 65 IN | HEART RATE: 85 BPM
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
## 2024-08-11 DIAGNOSIS — S61.451A DOG BITE OF RIGHT HAND, INITIAL ENCOUNTER: Primary | ICD-10-CM

## 2024-08-11 PROCEDURE — 90471 IMMUNIZATION ADMIN: CPT | Performed by: EMERGENCY MEDICINE

## 2024-08-11 PROCEDURE — 73130 X-RAY EXAM OF HAND: CPT

## 2024-08-11 PROCEDURE — 90714 TD VACC NO PRESV 7 YRS+ IM: CPT | Performed by: EMERGENCY MEDICINE

## 2024-08-11 PROCEDURE — 6360000002 HC RX W HCPCS: Performed by: EMERGENCY MEDICINE

## 2024-08-11 PROCEDURE — 6370000000 HC RX 637 (ALT 250 FOR IP): Performed by: PHYSICIAN ASSISTANT

## 2024-08-11 PROCEDURE — 99284 EMERGENCY DEPT VISIT MOD MDM: CPT

## 2024-08-11 RX ORDER — DOXYCYCLINE HYCLATE 100 MG
100 TABLET ORAL 2 TIMES DAILY
Qty: 14 TABLET | Refills: 0 | Status: SHIPPED | OUTPATIENT
Start: 2024-08-11 | End: 2024-08-18

## 2024-08-11 RX ORDER — ACETAMINOPHEN 500 MG
1000 TABLET ORAL
Status: COMPLETED | OUTPATIENT
Start: 2024-08-11 | End: 2024-08-11

## 2024-08-11 RX ADMIN — CLOSTRIDIUM TETANI TOXOID ANTIGEN (FORMALDEHYDE INACTIVATED) AND CORYNEBACTERIUM DIPHTHERIAE TOXOID ANTIGEN (FORMALDEHYDE INACTIVATED) 0.5 ML: 5; 2 INJECTION, SUSPENSION INTRAMUSCULAR at 18:02

## 2024-08-11 RX ADMIN — ACETAMINOPHEN 1000 MG: 500 TABLET ORAL at 18:02

## 2024-08-11 ASSESSMENT — PAIN DESCRIPTION - LOCATION: LOCATION: HAND

## 2024-08-11 ASSESSMENT — PAIN DESCRIPTION - ORIENTATION: ORIENTATION: RIGHT

## 2024-08-11 ASSESSMENT — PAIN DESCRIPTION - DESCRIPTORS: DESCRIPTORS: BURNING

## 2024-08-11 ASSESSMENT — LIFESTYLE VARIABLES
HOW MANY STANDARD DRINKS CONTAINING ALCOHOL DO YOU HAVE ON A TYPICAL DAY: PATIENT DOES NOT DRINK
HOW OFTEN DO YOU HAVE A DRINK CONTAINING ALCOHOL: NEVER

## 2024-08-11 ASSESSMENT — PAIN SCALES - GENERAL: PAINLEVEL_OUTOF10: 10

## 2024-08-11 NOTE — ED PROVIDER NOTES
[facility] EMERGENCY CTR  EMERGENCY DEPARTMENT ENCOUNTER      Pt Name: Kennedy Myers  MRN: [id number]  Birthdate 1970  Date of evaluation: 8/11/2024  Provider: DENNYS Mock    CHIEF COMPLAINT       Chief Complaint   Patient presents with    Animal Bite         HISTORY OF PRESENT ILLNESS   (Location/Symptom, Timing/Onset, Context/Setting, Quality, Duration, Modifying Factors, Severity)  Note limiting factors.   This is a 53-year-old female with medical history remarkable for asthma, allergic rhinitis, migraines, hypercholesterolemia presented to the emergency department for evaluation of a dog bite sustained to the right hand at approximately 12:45 PM today.  She reports pain at the site of the bite.  She has cleansed the wound with Betadine and running out of the water at home.  She reports pain is increased with movement of the thumb including making a fist.  She is unable to recall the date of her last tetanus immunization.  She states that the dog's vaccinations are up-to-date.  This was a family dog.  She was in her usual state of health prior to this injury. She is rt hand dominant.             Review of External Medical Records:     Nursing Notes were reviewed.    REVIEW OF SYSTEMS    (2-9 systems for level 4, 10 or more for level 5)     Review of Systems   Constitutional:  Negative for activity change and chills.   HENT:  Negative for congestion, ear pain, rhinorrhea, sneezing and sore throat.    Respiratory:  Negative for cough and shortness of breath.    Cardiovascular:  Negative for chest pain.   Gastrointestinal:  Negative for abdominal pain, constipation, diarrhea, nausea and vomiting.   Genitourinary:  Negative for difficulty urinating, frequency and urgency.   Musculoskeletal:  Negative for arthralgias.   Skin:  Positive for wound (rt hand).   Neurological:  Negative for weakness, numbness and headaches.       Except as noted above the remainder of the review of systems was reviewed

## 2024-08-11 NOTE — ED TRIAGE NOTES
Patient arrives with c/o dog bite to the right hand near the thumb and wrist around 12pm today by her own dog. Reports her own vaccines are up to date and the dog's too.

## 2024-08-11 NOTE — ED NOTES
The puncture wound was irrigated with sterile saline water, loose dressing applied per verbal order from NP.

## 2024-08-11 NOTE — ED NOTES
Unable to get in touch with animal control from [facility]. Call twice, no one answered.

## 2024-08-11 NOTE — DISCHARGE INSTRUCTIONS
Avoid sun exposure while taking this antibiotic, it may cause a rash and skin sensitivity  Probiotic daily while taking antibiotic  Monitor the hand closely for increased pain, redness, swelling, drainage and decreased ability to move the thumb. If these develop return to the emergency department  Tylenol 1 gram every 6-8 hours may be taken for pain as needed

## 2024-09-03 ENCOUNTER — OFFICE VISIT (OUTPATIENT)
Age: 54
End: 2024-09-03

## 2024-09-03 VITALS
RESPIRATION RATE: 18 BRPM | SYSTOLIC BLOOD PRESSURE: 127 MMHG | WEIGHT: 232 LBS | DIASTOLIC BLOOD PRESSURE: 78 MMHG | OXYGEN SATURATION: 98 % | BODY MASS INDEX: 38.65 KG/M2 | HEIGHT: 65 IN | TEMPERATURE: 98.4 F | HEART RATE: 79 BPM

## 2024-09-03 DIAGNOSIS — N39.0 URINARY TRACT INFECTION WITHOUT HEMATURIA, SITE UNSPECIFIED: Primary | ICD-10-CM

## 2024-09-03 PROBLEM — Z90.710 H/O: HYSTERECTOMY: Status: ACTIVE | Noted: 2024-09-03

## 2024-09-03 PROBLEM — R06.00 DYSPNEA: Status: ACTIVE | Noted: 2017-12-22

## 2024-09-03 PROBLEM — J45.909 ASTHMA: Status: ACTIVE | Noted: 2017-12-22

## 2024-09-03 PROBLEM — E78.2 MIXED HYPERLIPIDEMIA: Status: ACTIVE | Noted: 2024-09-03

## 2024-09-03 PROBLEM — K59.00 CONSTIPATION: Status: ACTIVE | Noted: 2024-09-03

## 2024-09-03 PROBLEM — U07.1 COVID-19: Status: ACTIVE | Noted: 2021-09-21

## 2024-09-03 PROBLEM — B00.9 HERPES SIMPLEX TYPE 2 INFECTION: Status: ACTIVE | Noted: 2024-09-03

## 2024-09-03 LAB
BILIRUBIN, URINE, POC: NEGATIVE
BLOOD URINE, POC: ABNORMAL
GLUCOSE URINE, POC: NEGATIVE
KETONES, URINE, POC: NEGATIVE
LEUKOCYTE ESTERASE, URINE, POC: ABNORMAL
NITRITE, URINE, POC: POSITIVE
PH, URINE, POC: 6.5 (ref 4.6–8)
PROTEIN,URINE, POC: ABNORMAL
SPECIFIC GRAVITY, URINE, POC: 1.02 (ref 1–1.03)
URINALYSIS CLARITY, POC: ABNORMAL
URINALYSIS COLOR, POC: YELLOW
UROBILINOGEN, POC: NORMAL

## 2024-09-03 RX ORDER — PHENAZOPYRIDINE HYDROCHLORIDE 200 MG/1
200 TABLET, FILM COATED ORAL 3 TIMES DAILY PRN
Qty: 10 TABLET | Refills: 0 | Status: SHIPPED | OUTPATIENT
Start: 2024-09-03 | End: 2024-09-06

## 2024-09-03 RX ORDER — NITROFURANTOIN 25; 75 MG/1; MG/1
100 CAPSULE ORAL 2 TIMES DAILY
Qty: 14 CAPSULE | Refills: 0 | Status: SHIPPED | OUTPATIENT
Start: 2024-09-03 | End: 2024-09-10

## 2024-09-03 ASSESSMENT — ENCOUNTER SYMPTOMS
RESPIRATORY NEGATIVE: 1
EYES NEGATIVE: 1
ALLERGIC/IMMUNOLOGIC NEGATIVE: 1
GASTROINTESTINAL NEGATIVE: 1

## 2024-09-03 NOTE — PROGRESS NOTES
9/3/2024   Kennedy Myers (: 1970) is a 53 y.o. female, New patient, here for evaluation of the following chief complaint(s):  Cystitis (Frequent urination, pressure to urinate since Saturday. )     ASSESSMENT/PLAN:  Below is the assessment and plan developed based on review of pertinent history, physical exam, labs, studies, and medications.  1. Urinary tract infection without hematuria, site unspecified  -     Culture, Urine; Future  -     AMB POC URINALYSIS DIP STICK AUTO W/ MICRO  -     nitrofurantoin, macrocrystal-monohydrate, (MACROBID) 100 MG capsule; Take 1 capsule by mouth 2 times daily for 7 days, Disp-14 capsule, R-0Normal  -     phenazopyridine (PYRIDIUM) 200 MG tablet; Take 1 tablet by mouth 3 times daily as needed for Pain, Disp-10 tablet, R-0Normal         Handout given with care instructions  2. OTC for symptom management. Increase fluid intake, ensure adequate nutritional intake.  3. Follow up with PCP as needed.  4. Go to ED with development of any acute symptoms.     Follow up:  Return if symptoms worsen or fail to improve.  Follow up immediately for any new, worsening or changes or if symptoms are not improving over the next 5-7 days.     SUBJECTIVE/OBJECTIVE:  HPI     Diagnoses and all orders for this visit:  Urinary tract infection without hematuria, site unspecified  Cystitis (Frequent urination, pressure to urinate since Saturday. )      Patient complains of frequency.  Onset was 4 days ago, unchanged since that time. Patient denies back pain and fever. Patient does have a history of recurrent UTI.  Patient does not have a history of pyelonephritis. Patient does not have a history of kidney stone.  I have advised patient to:  Drink plenty of fluids. If you develop fever, abdomenal pain, back pain, or nausea and vomiting then return to clinic or go to the emergency room.  This could indicate that you have a more serious infection or and infection in the kidneys.     Results for

## 2024-09-06 LAB — BACTERIA UR CULT: ABNORMAL

## 2024-10-01 NOTE — PERIOP NOTE
Hello,     You are scheduled to have surgery tomorrow at [facility].     We would like for you to arrive at  1030 am  We are located on the second floor, [address]. You will check-in at the registration desk located outside the elevators on the second floor prior to proceeding to [address].  Remember nothing to eat or drink after midnight. If you need to take medications the morning of surgery, please take with a few sips of water.   Wear loose, comfortable clothing and leave all your jewelry at home.   You may bring your cell phone with you.  One family member will be allowed in the pre-op area once you are dressed and your IV has been started.   You will need someone to drive you home and be with you for 24 hours post-anesthesia.     We look forward to seeing you! Call [phone] for questions after hours and [phone] between 5:30AM and 6PM.     Thanks!    [facility] ASU PREOP TEAM

## 2024-10-01 NOTE — DISCHARGE INSTRUCTIONS
HAND DISCHARGE INSTRUCTION SHEET         Dr. RICO Manjarrez MD         You have undergone surgery by Dr. Manjarrez.  Please follow these instructions to ensure a safe and speedy recovery.    SURGICAL DRESSING (bandage):  Your bandage should be kept dry and in place until you return to the office for your follow up visit.  This helps to guard against infection.  Should  your bandage become wet, please contact our office.        [x]         You can shower if you place a plastic bag over your dressing.    []         You will need to sponge bathe until you are seen in the office.    ELEVATION:  It is VERY IMPORTANT that you keep your arm and hand above the level of your heart at all times, awake or asleep.  The higher you elevate your hand, the less it will swell, and the less it will hurt.  Elevate your arm with your forearm in a vertical upright position. It is best to elevate the hand as shown below:              Laying down, especially at night,  with your arm elevated on pillows help keep your shoulder and elbow from getting stiff.           Ice bags / ice packs can be used to help control pain.  If you make your own ice bag, double-bagging helps to prevent leaks.    MEDICATIONS:  You have been given a prescription for pain medication.  Take it according to the instructions on the bottle.  DO NOT drink alcohol while taking pain medications. DO NOT drive, operate heavy machinery, or make important personal or business decisions since the medications will make you drowsy.  We do NOT refill prescriptions over the weekend.  Please arrange to call for prescription refills during regular office hours.    You may convert to over the counter medication (Tylenol, Ibuprofen, Aleve, etc) when you do not feel you need to take the prescription pain  medication any longer.  Make sure to eat food prior to taking pain medication or over the counter NSAID medication to protect your stomach from irritation.

## 2024-10-02 ENCOUNTER — ANESTHESIA (OUTPATIENT)
Facility: HOSPITAL | Age: 54
End: 2024-10-02
Payer: COMMERCIAL

## 2024-10-02 ENCOUNTER — HOSPITAL ENCOUNTER (OUTPATIENT)
Facility: HOSPITAL | Age: 54
Setting detail: OUTPATIENT SURGERY
Discharge: HOME | End: 2024-10-02
Attending: ORTHOPAEDIC SURGERY | Admitting: ORTHOPAEDIC SURGERY
Payer: COMMERCIAL

## 2024-10-02 ENCOUNTER — ANESTHESIA EVENT (OUTPATIENT)
Facility: HOSPITAL | Age: 54
End: 2024-10-02
Payer: COMMERCIAL

## 2024-10-02 VITALS
OXYGEN SATURATION: 100 % | HEIGHT: 65 IN | WEIGHT: 232.14 LBS | RESPIRATION RATE: 13 BRPM | SYSTOLIC BLOOD PRESSURE: 114 MMHG | TEMPERATURE: 97.8 F | HEART RATE: 77 BPM | DIASTOLIC BLOOD PRESSURE: 97 MMHG | BODY MASS INDEX: 38.68 KG/M2

## 2024-10-02 DIAGNOSIS — M65.331 TRIGGER MIDDLE FINGER OF RIGHT HAND: Primary | ICD-10-CM

## 2024-10-02 PROCEDURE — 2500000003 HC RX 250 WO HCPCS: Performed by: NURSE ANESTHETIST, CERTIFIED REGISTERED

## 2024-10-02 PROCEDURE — 7100000001 HC PACU RECOVERY - ADDTL 15 MIN: Performed by: ORTHOPAEDIC SURGERY

## 2024-10-02 PROCEDURE — 2500000003 HC RX 250 WO HCPCS: Performed by: ORTHOPAEDIC SURGERY

## 2024-10-02 PROCEDURE — 3700000000 HC ANESTHESIA ATTENDED CARE: Performed by: ORTHOPAEDIC SURGERY

## 2024-10-02 PROCEDURE — 7100000000 HC PACU RECOVERY - FIRST 15 MIN: Performed by: ORTHOPAEDIC SURGERY

## 2024-10-02 PROCEDURE — 2580000003 HC RX 258: Performed by: STUDENT IN AN ORGANIZED HEALTH CARE EDUCATION/TRAINING PROGRAM

## 2024-10-02 PROCEDURE — 2580000003 HC RX 258: Performed by: NURSE ANESTHETIST, CERTIFIED REGISTERED

## 2024-10-02 PROCEDURE — 3700000001 HC ADD 15 MINUTES (ANESTHESIA): Performed by: ORTHOPAEDIC SURGERY

## 2024-10-02 PROCEDURE — 6360000002 HC RX W HCPCS: Performed by: NURSE ANESTHETIST, CERTIFIED REGISTERED

## 2024-10-02 PROCEDURE — 3600000012 HC SURGERY LEVEL 2 ADDTL 15MIN: Performed by: ORTHOPAEDIC SURGERY

## 2024-10-02 PROCEDURE — 7100000010 HC PHASE II RECOVERY - FIRST 15 MIN: Performed by: ORTHOPAEDIC SURGERY

## 2024-10-02 PROCEDURE — 2709999900 HC NON-CHARGEABLE SUPPLY: Performed by: ORTHOPAEDIC SURGERY

## 2024-10-02 PROCEDURE — 6360000002 HC RX W HCPCS: Performed by: ORTHOPAEDIC SURGERY

## 2024-10-02 PROCEDURE — 3600000002 HC SURGERY LEVEL 2 BASE: Performed by: ORTHOPAEDIC SURGERY

## 2024-10-02 RX ORDER — CLINDAMYCIN PHOSPHATE 900 MG/50ML
900 INJECTION, SOLUTION INTRAVENOUS
Status: DISCONTINUED | OUTPATIENT
Start: 2024-10-02 | End: 2024-10-02 | Stop reason: HOSPADM

## 2024-10-02 RX ORDER — SODIUM CHLORIDE, SODIUM LACTATE, POTASSIUM CHLORIDE, CALCIUM CHLORIDE 600; 310; 30; 20 MG/100ML; MG/100ML; MG/100ML; MG/100ML
INJECTION, SOLUTION INTRAVENOUS CONTINUOUS
Status: DISCONTINUED | OUTPATIENT
Start: 2024-10-02 | End: 2024-10-02 | Stop reason: HOSPADM

## 2024-10-02 RX ORDER — GABAPENTIN 300 MG/1
300 CAPSULE ORAL 3 TIMES DAILY
COMMUNITY

## 2024-10-02 RX ORDER — TRAMADOL HYDROCHLORIDE 50 MG/1
50 TABLET ORAL EVERY 4 HOURS PRN
Qty: 15 TABLET | Refills: 0
Start: 2024-10-02 | End: 2024-10-05

## 2024-10-02 RX ORDER — FENTANYL CITRATE 50 UG/ML
INJECTION, SOLUTION INTRAMUSCULAR; INTRAVENOUS
Status: DISCONTINUED | OUTPATIENT
Start: 2024-10-02 | End: 2024-10-02 | Stop reason: SDUPTHER

## 2024-10-02 RX ORDER — LIDOCAINE HYDROCHLORIDE 10 MG/ML
1 INJECTION, SOLUTION EPIDURAL; INFILTRATION; INTRACAUDAL; PERINEURAL
Status: DISCONTINUED | OUTPATIENT
Start: 2024-10-02 | End: 2024-10-02 | Stop reason: HOSPADM

## 2024-10-02 RX ORDER — NALOXONE HYDROCHLORIDE 0.4 MG/ML
INJECTION, SOLUTION INTRAMUSCULAR; INTRAVENOUS; SUBCUTANEOUS PRN
Status: DISCONTINUED | OUTPATIENT
Start: 2024-10-02 | End: 2024-10-02 | Stop reason: HOSPADM

## 2024-10-02 RX ORDER — ONDANSETRON 2 MG/ML
4 INJECTION INTRAMUSCULAR; INTRAVENOUS
Status: DISCONTINUED | OUTPATIENT
Start: 2024-10-02 | End: 2024-10-02 | Stop reason: HOSPADM

## 2024-10-02 RX ORDER — MIDAZOLAM HYDROCHLORIDE 2 MG/2ML
2 INJECTION, SOLUTION INTRAMUSCULAR; INTRAVENOUS
Status: DISCONTINUED | OUTPATIENT
Start: 2024-10-02 | End: 2024-10-02 | Stop reason: HOSPADM

## 2024-10-02 RX ORDER — FENTANYL CITRATE 50 UG/ML
100 INJECTION, SOLUTION INTRAMUSCULAR; INTRAVENOUS
Status: DISCONTINUED | OUTPATIENT
Start: 2024-10-02 | End: 2024-10-02 | Stop reason: HOSPADM

## 2024-10-02 RX ORDER — MIDAZOLAM HYDROCHLORIDE 1 MG/ML
INJECTION INTRAMUSCULAR; INTRAVENOUS
Status: DISCONTINUED | OUTPATIENT
Start: 2024-10-02 | End: 2024-10-02 | Stop reason: SDUPTHER

## 2024-10-02 RX ORDER — DEXMEDETOMIDINE HYDROCHLORIDE 100 UG/ML
INJECTION, SOLUTION INTRAVENOUS
Status: DISCONTINUED | OUTPATIENT
Start: 2024-10-02 | End: 2024-10-02 | Stop reason: SDUPTHER

## 2024-10-02 RX ORDER — PROPOFOL 10 MG/ML
INJECTION, EMULSION INTRAVENOUS
Status: DISCONTINUED | OUTPATIENT
Start: 2024-10-02 | End: 2024-10-02 | Stop reason: SDUPTHER

## 2024-10-02 RX ORDER — DIPHENHYDRAMINE HYDROCHLORIDE 50 MG/ML
12.5 INJECTION INTRAMUSCULAR; INTRAVENOUS
Status: DISCONTINUED | OUTPATIENT
Start: 2024-10-02 | End: 2024-10-02 | Stop reason: HOSPADM

## 2024-10-02 RX ADMIN — PROPOFOL 30 MG: 10 INJECTION, EMULSION INTRAVENOUS at 12:28

## 2024-10-02 RX ADMIN — MIDAZOLAM HYDROCHLORIDE 2 MG: 1 INJECTION, SOLUTION INTRAMUSCULAR; INTRAVENOUS at 12:24

## 2024-10-02 RX ADMIN — PROPOFOL 100 MCG/KG/MIN: 10 INJECTION, EMULSION INTRAVENOUS at 12:24

## 2024-10-02 RX ADMIN — FENTANYL CITRATE 50 MCG: 50 INJECTION, SOLUTION INTRAMUSCULAR; INTRAVENOUS at 12:19

## 2024-10-02 RX ADMIN — SODIUM CHLORIDE 900 MG: 9 INJECTION, SOLUTION INTRAVENOUS at 12:30

## 2024-10-02 RX ADMIN — MIDAZOLAM HYDROCHLORIDE 3 MG: 1 INJECTION, SOLUTION INTRAMUSCULAR; INTRAVENOUS at 12:19

## 2024-10-02 RX ADMIN — FENTANYL CITRATE 25 MCG: 50 INJECTION, SOLUTION INTRAMUSCULAR; INTRAVENOUS at 12:24

## 2024-10-02 RX ADMIN — DEXMEDETOMIDINE 10 MCG: 100 INJECTION, SOLUTION INTRAVENOUS at 12:19

## 2024-10-02 RX ADMIN — SODIUM CHLORIDE, POTASSIUM CHLORIDE, SODIUM LACTATE AND CALCIUM CHLORIDE: 600; 310; 30; 20 INJECTION, SOLUTION INTRAVENOUS at 12:19

## 2024-10-02 ASSESSMENT — PAIN SCALES - GENERAL
PAINLEVEL_OUTOF10: 0
PAINLEVEL_OUTOF10: 0

## 2024-10-02 ASSESSMENT — PAIN - FUNCTIONAL ASSESSMENT: PAIN_FUNCTIONAL_ASSESSMENT: 0-10

## 2024-10-02 NOTE — ADDENDUM NOTE
Addendum  created 10/02/24 1314 by Maria Del Carmen George APRN - CRNA    Intraprocedure Meds edited, Orders acknowledged in Narrator

## 2024-10-02 NOTE — PROGRESS NOTES
Patient Fall Protocol  Yellow arm band applied to patient and yellow non skid socks placed on  Bed in low position, all side rails up, call bell in reach  Pt and Family instructed in \"call- don't fall\" protocol   -use your call bell, wait for assistance, staff not family will assist you to get up and move about   Pt and family verbalize understanding of fall precautions and the \"call don't fall\" Protocol

## 2024-10-02 NOTE — ANESTHESIA PRE PROCEDURE
Department of Anesthesiology  Preprocedure Note       Name:  Kennedy Myers   Age:  53 y.o.  :  1970                                          MRN:  [id number]         Date:  10/2/2024      Surgeon: Surgeon(s):  Deon Manjarrez MD    Procedure: Procedure(s):  RIGHT MIDDLE FINGER A1 PULLEY RELEASE (LOCAL WITH MAC)    Medications prior to admission:   Prior to Admission medications    Medication Sig Start Date End Date Taking? Authorizing Provider   gabapentin (NEURONTIN) 300 MG capsule Take 1 capsule by mouth 3 times daily. Max Daily Amount: 900 mg   Yes Provider, MD Aidee   cyclobenzaprine (FLEXERIL) 5 MG tablet Take 1 tablet by mouth 3 times daily as needed 24  Yes ProviderAidee MD   fexofenadine (ALLEGRA) 180 MG tablet Take 1 tablet by mouth daily as needed 21  Yes Automatic Reconciliation, Ar   Rimegepant Sulfate (NURTEC) 75 MG TBDP Take 75 mg by mouth every other day 3/12/24   Vinny Esquivel Jr., MD   butalbital-APAP-caffeine (FIORICET) -40 MG CAPS per capsule Take 1 capsule by mouth every 6 hours as needed for Headaches or Migraine  Patient not taking: Reported on 9/3/2024 12/4/23   Armida Damon PA-C   albuterol sulfate HFA (PROVENTIL;VENTOLIN;PROAIR) 108 (90 Base) MCG/ACT inhaler Inhale 2 puffs into the lungs every 6 hours as needed  Patient not taking: Reported on 9/3/2024 1/3/23   Automatic Reconciliation, Ar   albuterol (PROVENTIL) (2.5 MG/3ML) 0.083% nebulizer solution Inhale 3 mLs into the lungs every 6 hours as needed  Patient not taking: Reported on 9/3/2024 1/3/23   Automatic Reconciliation, Ar       Current medications:    Current Facility-Administered Medications   Medication Dose Route Frequency Provider Last Rate Last Admin    lidocaine PF 1 % injection 1 mL  1 mL IntraDERmal Once PRN Gomez Nolasco MD        fentaNYL (SUBLIMAZE) injection 100 mcg  100 mcg IntraVENous Once PRN Gomez Nolasco MD        lactated ringers IV soln

## 2024-10-02 NOTE — ANESTHESIA POSTPROCEDURE EVALUATION
Department of Anesthesiology  Postprocedure Note    Patient: Kennedy Myers  MRN: [id number]  YOB: 1970  Date of evaluation: 10/2/2024    Procedure Summary       Date: 10/02/24 Room / Location: [facility] ASU OR [address] AMBULATORY OR    Anesthesia Start: 1219 Anesthesia Stop: 1248    Procedure: RIGHT MIDDLE FINGER A1 PULLEY RELEASE (LOCAL WITH MAC) (Right: Hand) Diagnosis:       Trigger middle finger of right hand      (Trigger middle finger of right hand [M65.331])    Surgeons: Deon Manjarrez MD Responsible Provider: Christiano Galeana MD    Anesthesia Type: MAC ASA Status: 2            Anesthesia Type: MAC    Jaylene Phase I: Jaylene Score: 9    Jaylene Phase II:      Anesthesia Post Evaluation    Patient location during evaluation: PACU  Patient participation: complete - patient participated  Level of consciousness: awake  Airway patency: patent  Nausea & Vomiting: no vomiting and no nausea  Cardiovascular status: hemodynamically stable  Respiratory status: acceptable  Hydration status: stable  Pain management: adequate    No notable events documented.

## 2024-10-02 NOTE — BRIEF OP NOTE
Brief Postoperative Note      Patient: Kennedy Myers  YOB: 1970  MRN: [id number]    Date of Procedure: 10/2/2024    Pre-Op Diagnosis Codes:      * Trigger middle finger of right hand [M65.331]    Post-Op Diagnosis: Same       Procedure(s):  RIGHT MIDDLE FINGER A1 PULLEY RELEASE (LOCAL WITH MAC)    Surgeon(s):  Deon Manjarrez MD    Assistant:  * No surgical staff found *    Anesthesia: Monitor Anesthesia Care    Estimated Blood Loss (mL): Minimal    Complications: None    Specimens:   * No specimens in log *    Implants:  * No implants in log *      Drains: * No LDAs found *    Findings:  Infection Present At Time Of Surgery (PATOS) (choose all levels that have infection present):  No infection present  Other Findings: see op note    Electronically signed by Deon Manjarrez MD on 10/2/2024 at 12:55 PM

## 2024-10-03 NOTE — OP NOTE
[facility]          [address] [place]  [address]                            OPERATIVE REPORT      PATIENT NAME: JUSTINE VEGA        : 1970  MED REC NO: [id number]                       ROOM:   ACCOUNT NO: [id number]                       ADMIT DATE: 10/02/2024  PROVIDER: Deon Ashton MD    DATE OF SERVICE:  10/02/2024    PREOPERATIVE DIAGNOSES:  Right middle finger trigger.    POSTOPERATIVE DIAGNOSES:  Right middle finger trigger.    PROCEDURES PERFORMED:  Right middle finger A1 pulley release.    SURGEON:  Deon Ashton MD    ASSISTANT:  None.    ANESTHESIA:  IV sedation with digital block right middle finger.    ESTIMATED BLOOD LOSS:  Zero.    SPECIMENS REMOVED:  none    INTRAOPERATIVE FINDINGS:  stenosing tenosynovitis right middle finger      COMPLICATIONS:  None.    IMPLANTS:  none    INDICATIONS:  The patient is a 53-year-old female with a history of pain and triggering involving the right middle finger with limited mobility.  She was counseled regarding surgical and nonsurgical treatment and elected to proceed with the procedure as above.  Risks and benefits were outlined.    DESCRIPTION OF PROCEDURE:  The patient was taken to the operating room, placed supine on the operating table.  Following administration of IV sedation, digital block to the right middle finger was performed with 0.5% Marcaine, 1% lidocaine after approximately 10 mL.  The right hand and arm prepped and draped in sterile fashion with Hibiclens and alcohol and tourniquet applied to the right proximal arm.  The arm was exsanguinated with an Esmarch bandage and the tourniquet inflated to 150  mmHg.  An incision was made at the base of the right middle finger between the distal palmar crease and palmar digital crease.  Subcutaneous dissection was carried out.  Neurovascular bundles radially and ulnarly identified and protected.  The A1 pulley was then

## 2025-03-13 DIAGNOSIS — G43.709 CHRONIC MIGRAINE WITHOUT AURA WITHOUT STATUS MIGRAINOSUS, NOT INTRACTABLE: ICD-10-CM

## 2025-03-13 RX ORDER — RIMEGEPANT SULFATE 75 MG/75MG
TABLET, ORALLY DISINTEGRATING ORAL
COMMUNITY
Start: 2025-03-13

## 2025-04-14 DIAGNOSIS — G43.709 CHRONIC MIGRAINE WITHOUT AURA WITHOUT STATUS MIGRAINOSUS, NOT INTRACTABLE: ICD-10-CM

## 2025-04-14 RX ORDER — RIMEGEPANT SULFATE 75 MG/75MG
TABLET, ORALLY DISINTEGRATING ORAL
Qty: 16 TABLET | Refills: 5 | Status: SHIPPED | OUTPATIENT
Start: 2025-04-14

## 2025-04-24 ENCOUNTER — APPOINTMENT (OUTPATIENT)
Facility: HOSPITAL | Age: 55
End: 2025-04-24
Payer: COMMERCIAL

## 2025-04-24 ENCOUNTER — HOSPITAL ENCOUNTER (EMERGENCY)
Facility: HOSPITAL | Age: 55
Discharge: HOME | End: 2025-04-24
Attending: STUDENT IN AN ORGANIZED HEALTH CARE EDUCATION/TRAINING PROGRAM
Payer: COMMERCIAL

## 2025-04-24 VITALS
TEMPERATURE: 97.7 F | WEIGHT: 178 LBS | OXYGEN SATURATION: 98 % | SYSTOLIC BLOOD PRESSURE: 150 MMHG | DIASTOLIC BLOOD PRESSURE: 87 MMHG | HEIGHT: 60 IN | BODY MASS INDEX: 34.95 KG/M2 | RESPIRATION RATE: 18 BRPM | HEART RATE: 63 BPM
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
## 2025-04-24 DIAGNOSIS — S16.1XXA ACUTE STRAIN OF NECK MUSCLE, INITIAL ENCOUNTER: Primary | ICD-10-CM

## 2025-04-24 PROCEDURE — 70450 CT HEAD/BRAIN W/O DYE: CPT

## 2025-04-24 PROCEDURE — 72125 CT NECK SPINE W/O DYE: CPT

## 2025-04-24 PROCEDURE — 6370000000 HC RX 637 (ALT 250 FOR IP)

## 2025-04-24 PROCEDURE — 99284 EMERGENCY DEPT VISIT MOD MDM: CPT

## 2025-04-24 RX ORDER — LIDOCAINE 4 G/G
1 PATCH TOPICAL DAILY
Qty: 4 EACH | Refills: 0 | Status: SHIPPED | OUTPATIENT
Start: 2025-04-24 | End: 2025-04-28

## 2025-04-24 RX ORDER — BUTALBITAL, ACETAMINOPHEN AND CAFFEINE 50; 325; 40 MG/1; MG/1; MG/1
1 TABLET ORAL EVERY 4 HOURS PRN
Status: DISCONTINUED | OUTPATIENT
Start: 2025-04-24 | End: 2025-04-24 | Stop reason: HOSPADM

## 2025-04-24 RX ORDER — METHOCARBAMOL 500 MG/1
1500 TABLET, FILM COATED ORAL
Status: COMPLETED | OUTPATIENT
Start: 2025-04-24 | End: 2025-04-24

## 2025-04-24 RX ORDER — LIDOCAINE 4 G/G
1 PATCH TOPICAL
Status: DISCONTINUED | OUTPATIENT
Start: 2025-04-24 | End: 2025-04-24 | Stop reason: HOSPADM

## 2025-04-24 RX ADMIN — BUTALBITAL, ACETAMINOPHEN, AND CAFFEINE 1 TABLET: 50; 325; 40 TABLET ORAL at 12:22

## 2025-04-24 RX ADMIN — METHOCARBAMOL TABLETS 1500 MG: 500 TABLET, COATED ORAL at 12:18

## 2025-04-24 ASSESSMENT — PAIN SCALES - GENERAL
PAINLEVEL_OUTOF10: 8
PAINLEVEL_OUTOF10: 8

## 2025-04-24 ASSESSMENT — PAIN - FUNCTIONAL ASSESSMENT: PAIN_FUNCTIONAL_ASSESSMENT: 0-10

## 2025-04-24 ASSESSMENT — PAIN DESCRIPTION - LOCATION: LOCATION: NECK

## 2025-04-24 NOTE — DISCHARGE INSTRUCTIONS
Please follow up with primary care and your orthopedic team for further evaluation and management of your neck pain. If your symptoms change or worsen please return to the ED.
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
## 2025-04-24 NOTE — ED NOTES
Pt given discharge instructions, prescriptions, and pt education. Pt instructed to follow-up w PCP & ORTHO.  Pt verbalizes understanding and all questions answered. Pt ambulatory out of ER accompanied with steady gait.

## 2025-04-24 NOTE — ED PROVIDER NOTES
of motion is intact however she has pain when looking to the right side.    Range of motion bilateral upper extremities intact.  Strength 5 out of 5 bilateral upper extremities.  Sensation equal intact bilateral upper extremities.  Cap refill less than 2 seconds all fingers.    Cardiovascular:      Rate and Rhythm: Normal rate and regular rhythm.   Pulmonary:      Effort: Pulmonary effort is normal.      Breath sounds: Normal breath sounds.   Abdominal:      General: There is no distension.      Palpations: Abdomen is soft.      Tenderness: There is no abdominal tenderness.      Comments: No seatbelt sign to abdomen or chest   Musculoskeletal:      Cervical back: Neck supple. Tenderness present.   Skin:     General: Skin is warm and dry.   Neurological:      General: No focal deficit present.      Mental Status: She is alert and oriented to person, place, and time.      Cranial Nerves: No cranial nerve deficit.      Motor: No weakness.      Gait: Gait normal.         DIAGNOSTIC RESULTS     EKG: All EKG's are interpreted by the Emergency Department Physician who either signs or Co-signs this chart in the absence of a cardiologist.        RADIOLOGY:   Non-plain film images such as CT, Ultrasound and MRI are read by the radiologist. Plain radiographic images are visualized and preliminarily interpreted by the emergency physician with the below findings:        Interpretation per the Radiologist below, if available at the time of this note:    CT HEAD WO CONTRAST   Final Result   Normal study            Electronically signed by Rosa Chen      CT CERVICAL SPINE WO CONTRAST   Final Result   No fracture, vertebral listhesis or facet malalignment.   Postsurgical changes from prior anterior spinal fusion as detailed without   obvious hardware associated complication.      Electronically signed by KAILEY CELESTIN           LABS:  Labs Reviewed - No data to display    All other labs were within normal range or not returned  [address]  [facility] [place] [address]  [phone]    Call in 1 day  ER follow up      DISCHARGE MEDICATIONS:  Discharge Medication List as of 4/24/2025  1:17 PM            (Please note that portions of this note were completed with a voice recognition program.  Efforts were made to edit the dictations but occasionally words are mis-transcribed.)    Deidre Good PA-C (electronically signed)  Emergency Attending Physician / Physician Assistant / Nurse Practitioner             Deidre Good PA-C  04/25/25 5117

## 2025-05-01 ENCOUNTER — OFFICE VISIT (OUTPATIENT)
Age: 55
End: 2025-05-01

## 2025-05-01 VITALS
SYSTOLIC BLOOD PRESSURE: 124 MMHG | DIASTOLIC BLOOD PRESSURE: 80 MMHG | WEIGHT: 178.8 LBS | RESPIRATION RATE: 16 BRPM | BODY MASS INDEX: 34.92 KG/M2 | HEART RATE: 62 BPM | OXYGEN SATURATION: 98 % | TEMPERATURE: 97.9 F

## 2025-05-01 DIAGNOSIS — J02.9 VIRAL PHARYNGITIS: Primary | ICD-10-CM

## 2025-05-01 DIAGNOSIS — J02.9 SORE THROAT: ICD-10-CM

## 2025-05-01 LAB — S PYO AG THROAT QL: NORMAL

## 2025-05-01 RX ORDER — TIZANIDINE 2 MG/1
TABLET ORAL
COMMUNITY

## 2025-05-01 RX ORDER — PANTOPRAZOLE SODIUM 20 MG/1
TABLET, DELAYED RELEASE ORAL
COMMUNITY

## 2025-05-01 NOTE — PATIENT INSTRUCTIONS
History and physical today are consistent with a viral pharyngitis  Negative strep test in clinic today, symptoms are not consistent with flu or COVID  Vital signs are stable, physical exam is benign, no evidence of bacterial infection at this time  Treat symptomatically  Tylenol/ibuprofen for fevers, chills, aches and pains  Hot tea with honey, saline sinus rinses, throat lozenges  Lots of fluid, plenty of rest  Follow up here or with PCP if symptoms persist or worsen  Go to ED if you develop any shortness of breath, chest pain or if you are unable to tolerate food or fluids

## 2025-05-01 NOTE — PROGRESS NOTES
Kennedy Myers (:  1970) is a 54 y.o. female,Established patient, here for evaluation of the following chief complaint(s):  Pharyngitis (Pt presents for sore throat, headache, white spots on back of throat, pain when swallowing x 4 days; Pt also reports having a acid reflux, so she is unsure if this is what's causing her pain)      Assessment & Plan :  Visit Diagnoses and Associated Orders         Viral pharyngitis    -  Primary           Sore throat        POCT rapid strep A [54267 Custom]           ORDERS WITHOUT AN ASSOCIATED DIAGNOSIS    pantoprazole (PROTONIX) 20 MG tablet [53150]      tiZANidine (ZANAFLEX) 2 MG tablet [60618]            History and physical today are consistent with a viral pharyngitis  Negative strep test in clinic today, symptoms are not consistent with flu or COVID  Vital signs are stable, physical exam is benign, no evidence of bacterial infection at this time  Treat symptomatically  Tylenol/ibuprofen for fevers, chills, aches and pains  Warm salt water gargles, hot tea with honey, saline sinus rinses, throat lozenges  Lots of fluid, plenty of rest  Follow up here or with PCP if symptoms persist or worsen  Go to ED if you develop any shortness of breath, chest pain or if you are unable to tolerate food or fluids       Subjective :    Pharyngitis       54 y.o. female presents with symptoms of sore throat which is ongoing for the past 4 days.  She does report some mild accompanying headache.  Denies fevers, chills or bodyaches.  She does report seeing some white spots on the back of her throat and uvula.  Denies any recent sick contacts, though she was with a family member about 2 weeks ago who was sick with a sore throat and rash.  She denies any significant nasal congestion, ear pain or cough.  No shortness of breath or wheezing.  No chest or abdominal pain, no nausea, vomiting or diarrhea.  She is not taking anything currently for her symptoms.         Vitals:    25

## (undated) DEVICE — GAUZE,SPONGE,FLUFF,6"X6.75",STRL,5/TRAY: Brand: MEDLINE

## (undated) DEVICE — GLOVE ORANGE PI 7   MSG9070

## (undated) DEVICE — SPONGE GZ W4XL4IN COT 12 PLY TYP VII WVN C FLD DSGN STERILE

## (undated) DEVICE — HAND-SFMCASU: Brand: MEDLINE INDUSTRIES, INC.

## (undated) DEVICE — SUTURE PROL SZ 5-0 L18IN NONABSORBABLE BLU L13MM P-3 3/8 8698G

## (undated) DEVICE — SOLUTION IRRIG 500ML 0.9% SOD CHLO USP POUR PLAS BTL

## (undated) DEVICE — BANDAGE,GAUZE,BULKEE II,4.5"X4.1YD,STRL: Brand: MEDLINE

## (undated) DEVICE — BANDAGE COMPR SELF ADH 5 YDX3 IN TAN NS PREMIERPRO LF

## (undated) DEVICE — GLOVE SURG SZ 6 THK91MIL LTX FREE SYN POLYISOPRENE ANTI

## (undated) DEVICE — ZIMMER® STERILE DISPOSABLE TOURNIQUET CUFF WITH PROTECTIVE SLEEVE AND PLC, DUAL PORT, SINGLE BLADDER, 18 IN. (46 CM)

## (undated) DEVICE — BLADE OPHTH 180DEG CUT SURF BLU STR SHRP DBL BVL GRINDLESS

## (undated) DEVICE — DRESSING PETRO W3XL3IN OIL EMUL N ADH GZ KNIT IMPREG CELOS

## (undated) DEVICE — GLOVE ORANGE PI 7 1/2   MSG9075